# Patient Record
Sex: MALE | Race: BLACK OR AFRICAN AMERICAN | NOT HISPANIC OR LATINO | Employment: FULL TIME | URBAN - METROPOLITAN AREA
[De-identification: names, ages, dates, MRNs, and addresses within clinical notes are randomized per-mention and may not be internally consistent; named-entity substitution may affect disease eponyms.]

---

## 2017-01-25 ENCOUNTER — OFFICE VISIT (OUTPATIENT)
Dept: INTERNAL MEDICINE | Facility: CLINIC | Age: 42
End: 2017-01-25
Payer: COMMERCIAL

## 2017-01-25 VITALS
OXYGEN SATURATION: 98 % | HEART RATE: 81 BPM | TEMPERATURE: 97 F | DIASTOLIC BLOOD PRESSURE: 82 MMHG | HEIGHT: 71 IN | BODY MASS INDEX: 34.38 KG/M2 | SYSTOLIC BLOOD PRESSURE: 146 MMHG | WEIGHT: 245.56 LBS

## 2017-01-25 DIAGNOSIS — R53.83 FATIGUE, UNSPECIFIED TYPE: ICD-10-CM

## 2017-01-25 DIAGNOSIS — G44.209 TENSION HEADACHE: ICD-10-CM

## 2017-01-25 DIAGNOSIS — M62.838 MUSCLE SPASM: Primary | ICD-10-CM

## 2017-01-25 DIAGNOSIS — H57.89 RED EYE: ICD-10-CM

## 2017-01-25 LAB
FLUAV AG SPEC QL IA: NEGATIVE
FLUBV AG SPEC QL IA: NEGATIVE
SPECIMEN SOURCE: NORMAL

## 2017-01-25 PROCEDURE — 96372 THER/PROPH/DIAG INJ SC/IM: CPT | Mod: S$GLB,,, | Performed by: FAMILY MEDICINE

## 2017-01-25 PROCEDURE — 99999 PR PBB SHADOW E&M-EST. PATIENT-LVL IV: CPT | Mod: PBBFAC,,, | Performed by: PHYSICIAN ASSISTANT

## 2017-01-25 PROCEDURE — 3077F SYST BP >= 140 MM HG: CPT | Mod: S$GLB,,, | Performed by: PHYSICIAN ASSISTANT

## 2017-01-25 PROCEDURE — 3079F DIAST BP 80-89 MM HG: CPT | Mod: S$GLB,,, | Performed by: PHYSICIAN ASSISTANT

## 2017-01-25 PROCEDURE — 99213 OFFICE O/P EST LOW 20 MIN: CPT | Mod: S$GLB,,, | Performed by: PHYSICIAN ASSISTANT

## 2017-01-25 PROCEDURE — 1159F MED LIST DOCD IN RCRD: CPT | Mod: S$GLB,,, | Performed by: PHYSICIAN ASSISTANT

## 2017-01-25 PROCEDURE — 87400 INFLUENZA A/B EACH AG IA: CPT | Mod: 59,PO

## 2017-01-25 RX ORDER — METHOCARBAMOL 750 MG/1
TABLET, FILM COATED ORAL
Qty: 32 TABLET | Refills: 0 | Status: SHIPPED | OUTPATIENT
Start: 2017-01-25 | End: 2017-09-20

## 2017-01-25 RX ORDER — NAPROXEN 500 MG/1
500 TABLET ORAL 2 TIMES DAILY PRN
Qty: 20 TABLET | Refills: 0 | Status: SHIPPED | OUTPATIENT
Start: 2017-01-25 | End: 2017-02-01

## 2017-01-25 RX ORDER — KETOROLAC TROMETHAMINE 30 MG/ML
60 INJECTION, SOLUTION INTRAMUSCULAR; INTRAVENOUS
Status: COMPLETED | OUTPATIENT
Start: 2017-01-25 | End: 2017-01-25

## 2017-01-25 RX ADMIN — KETOROLAC TROMETHAMINE 60 MG: 30 INJECTION, SOLUTION INTRAMUSCULAR; INTRAVENOUS at 01:01

## 2017-01-25 NOTE — PATIENT INSTRUCTIONS
Back Spasm (No Trauma)    Spasm of the back muscles can occur after a sudden forceful twisting or bending force (such as in a car accident), after a simple awkward movement, or after lifting something heavy with poor body positioning. In any case, muscle spasm adds to the pain. Sleeping in an awkward position or on a poor quality mattress can also cause this. Some people respond to emotional stress by tensing the muscles of their back.  Pain that continues may need further evaluation or other types of treatment such as physical therapy.  You don't always need X-rays for the initial evaluation of back pain, unless you had a physical injury such as from a car accident or fall. If your pain continues and doesn't respond to medical treatment, X-rays and other tests may then be done.   Home care  · As soon as possible, start sitting or walking again to avoid problems from prolonged bed rest (muscle weakness, worsening back stiffness and pain, blood clots in the legs).  · When in bed, try to find a position of comfort. A firm mattress is best. Try lying flat on your back with pillows under your knees. You can also try lying on your side with your knees bent up toward your chest and a pillow between your knees.  · Avoid prolonged sitting, long car rides, or travel. This puts more stress on the lower back than standing or walking.   · During the first 24 to 72 hours after an injury or flare-up, apply an ice pack to the painful area for 20 minutes, then remove it for 20 minutes. Do this over a period of 60 to 90 minutes or several times a day. This will reduce swelling and pain. Always wrap ice packs in a thin towel.  · You can start with ice, then switch to heat. Heat (hot shower, hot bath, or heating pad) reduces pain, and works well for muscle spasms. Apply heat to the painful area for 20 minutes, then remove it for 20 minutes. Do this over a period of 60 to 90 minutes or several times a day. Do not sleep on a heating  pad as it can burn or damage skin.  · Alternate ice and heat therapies.  · Be aware of safe lifting methods and do not lift anything over 15 pounds until all the pain is gone.  Gentle stretching will help your back heal faster. Do this simple routine 2 to 3 times a day until your back is feeling better.  · Lie on your back with your knees bent and both feet on the ground  · Slowly raise your left knee to your chest as you flatten your lower back against the floor. Hold for 20 to 30 seconds.  · Relax and repeat the exercise with your right knee.  · Do 2 to 3 of these exercises for each leg.  · Repeat, hugging both knees to your chest at the same time.  · Do not bounce, but use a gentle pull.  Medicines  Talk to your doctor before using medicine, especially if you have other medical problems or are taking other medicines.  You may use acetaminophen or ibuprofen to control pain, unless your healthcare provider prescribed another pain medicine. If you have a chronic condition such as diabetes, liver or kidney disease, stomach ulcer, or gastrointestinal bleeding, or are taking blood thinners, talk with your healthcare provider before taking any medicines.  Be careful if you are given prescription pain medicine, narcotics, or medicine for muscle spasm. They can cause drowsiness, affect your coordination, reflexes, or judgment. Do not drive or operate heavy machinery when taking these medicines. Take pain medicine only as prescribed by your healthcare provider.  Follow-up care  Follow up with your doctor, or as advised. Physical therapy or further tests may be needed.  If X-rays were taken, they may be reviewed by a radiologist. You will be notified of any new findings that may affect your care.  Call 911  Seek emergency medical care if any of these occur:  · Trouble breathing  · Confusion  · Drowsiness or trouble awakening  · Fainting or loss of consciousness  · Rapid or very slow heart rate  · Loss of bowel or bladder  control  When to seek medical advice  Call your healthcare provider right away if any of these occur:  · Pain becomes worse or spreads to your legs  · Weakness or numbness in one or both legs  · Numbness in the groin or genital area  · Unexplained fever over 100.4ºF (38.0ºC)  · Burning or pain when passing urine  © 5391-2840 [employer]. [address] [place] [address]. All rights reserved. This information is not intended as a substitute for professional medical care. Always follow your healthcare professional's instructions.        Muscle Spasm  A muscle spasm is a sudden tightening of the muscle you cant control. This may be caused by strain, overworking the muscle, or injury. It can also be caused by dehydration, electrolyte imbalance, diabetes, alcohol use, and certain medicines. If it goes on long enough the muscle spasm causes pain. Common areas for muscle spasm are the legs, neck, and back.  Home care  · Heat, massage, and stretching will help relax muscle spasm.  · When the spasm is in your arm or leg, stretch the muscle passively. To do this, have someone bend or straighten the joint above or below the muscle until you feel the stretch on the sore muscle. You can stretch the muscle actively by moving the affected body part. This will stretch the muscle that is in spasm. For example, if the spasm is in your calf, bend the ankle so your toes point upward toward your knee. This will stretch your calf muscle.  · You may use over-the-counter pain medicine to control pain, unless another medicine was prescribed. If you have chronic liver or kidney disease or ever had a stomach ulcer or GI bleeding, talk with your healthcare provider before using these medicines.  Follow-up care  Follow up with your healthcare provider, or as advised.    When to seek medical advice  Call your healthcare provider right away if any of the following occur:  · Fingers or toes become swollen, cold, blue, numb,  or tingly  · You develop weakness in the affected arm or leg  · Pain increases and is not controlled by the above measures  © 0105-6827 [employer]. [address] [place] [address], [place] [address]. All rights reserved. This information is not intended as a substitute for professional medical care. Always follow your healthcare professional's instructions.        Neck Spasm    A spasm of the neck muscles can happen after a sudden awkward neck movement. Sleeping with your neck in a crooked position can also cause spasm. Some people respond to emotional stress by tensing the muscles of their neck, shoulders, and upper back. If neck spasm lasts long enough, it can cause headache.  The treatment described below will usually help the pain to go away in 5 to 7 days. Pain that continues may need further evaluation or other types of treatment such as physical therapy.  Home care  · Rest and relax the muscles. Use a comfortable pillow that supports the head and keeps the spine in a neutral position. The position of the head should not be tilted forward or backward. A rolled up towel may help for a custom fit.  · Some people find relief with heat. Heat can be applied with either a warm shower or bath or a moist towel heated in the microwave and massage. Others prefer cold packs. You can make an ice pack by filling a plastic bag that seals at the top with ice cubes or crushed ice and then wrapping it with a thin towel. Try both and use the method that feels best for 15 to 20 minutes, several times a day.  · Whether using ice or heat, be careful that you do not injure your skin. Never put ice directly on the skin. Always wrap the ice in a towel or other type of cloth. This is very important, especially in people with poor skin sensations.  · Try to reduce your stress level. Emotional stress can lead to neck muscle tension and get in the way of or delay the healing process.  · You may use over-the-counter pain medicine to  control pain, unless another medicine was prescribed.If you have chronic liver or kidney disease or ever had a stomach ulcer or GI bleeding, talk with your healthcare provider before using these medicines.  Follow-up care  Follow up with your healthcare provider if your symptoms do not show signs of improvement after one week. Physical therapy or further tests may be needed.  If X-rays, CT scans, or MRI scans were taken, you will be told of any new findings that may affect your care.  Call 911  Call 911 if you have:  · Sudden weakness or numbness in one or both arms  · Neck swelling, difficulty or painful swallowing  · Difficulty breathing  · Chest pain  When to seek medical advice  Call your healthcare provider right away if any of these occur:  · Pain becomes worse or spreads into one or both arms  · Increasing headache with nausea or vomiting  · Fever of 100.4°F (38°C) or above lasting for 24 to 48 hours  © 1968-9340 [employer]. [address] [place] [address]. All rights reserved. This information is not intended as a substitute for professional medical care. Always follow your healthcare professional's instructions.        Understanding Noninfectious Red Eye: Treating Inflammation  Red eyes are sometimes caused by viral or bacterial infections. But inflammation in one or both eyes often happens because of allergies or environmental irritants. Here's a closer look at these two common causes of eye inflammation, and how to treat them.     Allergies  Do your eyes swell and itch when you pet a cat? Do they get red, watery, and itchy every spring or summer? If so, you may have an allergy to animals. Or you may have an  allergy to a fine powder (pollen) made by certain plants. Along with dust and mold, animals and pollen are the most common causes of allergies. Often both eyes will get inflamed when you are around whatever causes your allergy.  Treating the symptoms:  · The only cure for an  allergy is to avoid the substance (allergen) that causes it.  · Eye drops and cold compresses can help reduce swelling. They can ease redness and itching.  · Symptoms often get better if you use allergy eye drops. Or if you limit your contact with the allergen.   · If your allergy is severe, your healthcare provider may prescribe oral medicine. This may include antihistamines or steroids.  · Your provider may refer you to a specialist.  Environmental irritants  Your eyes can also be irritated by things such as:  · Air pollution  · Smoke  · Fumes  · Some eye drops  · Contact lenses, especially extended-wear lenses  These environmental irritants can make your eyes blood vessels swell. Your eyelids may get red and swollen. Your eyes may water. But they dont often itch as much as they do with allergies. Depending on the cause, one or both eyes may be inflamed.  Treating the symptoms:  · Stay away from the irritant.  · Try artificial tears to help flush out your eye. They lubricate your eyes surface.  · Ask your provider about medicated anti-inflammatory or anti-allergy eye drops. These can reduce swelling and ease redness.  © 0022-9902 [employer]. [address] [place] [address]. All rights reserved. This information is not intended as a substitute for professional medical care. Always follow your healthcare professional's instructions.        Understanding Red Eye: Causes  Do your eyes sometimes get red and irritated? This could be a sign of irritation or infection. The inside of your eyelid and the white of your eye are covered with a membrane called the conjunctiva. When your eye is irritated or infected, the blood vessels in this membrane swell. This condition is called conjunctivitis. It is also known as red eye or pink eye.    Irritation  Allergies and environmental irritants are common causes of inflamed eyes. Other causes can include:  · Injuries  · Objects in the eye  · Some eye  drops  · Makeup  · Contact lenses  · Diseases inside the eye  Infection  Viruses and bacteria can cause eye infections. An infection may begin in your eye. It can also start somewhere else in your body, such as your nose or throat. Sexually transmitted diseases can also cause eye infections.  © 4609-7602 [employer]. [address] [place] [address]. All rights reserved. This information is not intended as a substitute for professional medical care. Always follow your healthcare professional's instructions.        Treating Dry Eyes    Artificial tears are the most common treatment for dry eyes. If they dont relieve your symptoms, your eye doctor may put in plugs. Or you may have surgery to stop the draining and increase the tear film.  Artificial tears  Artificial tears, or lubricating eye drops, replace your natural lubricating tears. You can buy most lubricating eye drops without a prescription. And you can use them as often as needed. Lubricating eye drops are not the same as eye drops used to relieve redness or itching. Check with your eye doctor or pharmacist to be sure you buy the right drops.  Some lubricating eye drops have chemicals called preservatives. This makes them last longer. Your eyes may be sensitive to these drops. Or you may need to use them often. If so, you may want to buy lubricating eye drops made without preservatives. Your eye doctor may also suggest using a lubricating eye ointment at night.  Medicine  Your doctor may prescribe medicine such as cyclosporine to treat your eye condition. It can help increase your eyes' ability to make tears.  Plugs    Closing the puncta with plugs can help keep the tear film on your eye. The plug acts like a stopper in a sink. It allows only a small amount of tears to drain out of your eye. Your eye doctor may first try short-term (temporary) plugs that dissolve in a few days. If these help, he or she may then put in long-term plugs.  Your eyes will be numbed with drops when the plugs are inserted. You shouldnt feel any pain. And you shouldnt feel the plugs once theyre in.   Surgery  If artificial tears or plugs dont relieve your dry eyes, surgery may be an option. Your eye doctor may do minor outpatient surgery to narrow or block the openings to the drainage canals. If your dry eyes are caused by eyelid problems, your eye doctor may recommend other kinds of surgery.  © 0862-2420 [employer]. [address] [place] [address]. All rights reserved. This information is not intended as a substitute for professional medical care. Always follow your healthcare professional's instructions.        What Are Dry Eyes?  Do your eyes ever sting, burn, or feel scratchy? To be comfortable, your eyes need to be bathed, or lubricated, with tears. Normally, there is always a film of tears on the surface of your eyes. But if your eyes dont produce enough tears, the surface gets irritated. This is known as dry eyes.    Not enough lubricating tears  When you cry, or get something in your eye, or have an infection, your eyes make reflex tears. Each time you blink, another kind of tears, called lubricating tears, spread over the surface of your eyes. These tears keep the eyes moist and comfortable. You arent aware of these tears because they stay on the surface of your eyes.   Without lubricating tears, your eyes get dry. Then they burn or sting and feel scratchy. They may also water. But this doesnt relieve the dryness. That's because the eyes water with reflex tears, not lubricating tears.  What causes dry eyes?  · Aging  · Heaters and air conditioners  · Wind, smoke, or dry weather  · Allergies such as hay fever  · Medicines  · Eyelid problems, injuries to the eye, or diseases like rheumatoid arthritis  How lubricating tears flow  Lubricating tears flow from glands in your upper eyelid over the surface of your eye. From your eye, the  "tears drain into canals that lead to your nose.  © 7387-6486 [employer]. [address] [place] [address], [place] [address]. All rights reserved. This information is not intended as a substitute for professional medical care. Always follow your healthcare professional's instructions.        Self-Care for Headaches  Most headaches aren't serious and can be relieved with self-care. But some headaches may be a sign of another health problem like eye trouble or high blood pressure. To find the best treatment, learn what kind of headaches you get. For tension headaches, self-care will usually help. To treat migraines, ask your healthcare provider for advice. It is also possible to get both tension and migraine headaches. Self-care involves relieving the pain and avoiding headache triggers if you can.    Ways to reduce pain and tension  Try these steps:  · Apply a cold compress or ice pack to the pain site.  · Drink fluids. If nausea makes it hard to drink, try sucking on ice.  · Rest. Protect yourself from bright light and loud noises.  · Calm your emotions by imagining a peaceful scene.  · Massage tight neck, shoulder, and head muscles.  · To relax muscles, soak in a hot bath or use a hot shower.  Use medicines  Aspirin or aspirin substitutes, such as ibuprofen and acetaminophen, can relieve headache. Remember: Never give aspirin to anyone 18 years old or younger because of the risk of developing Reye syndrome. Use pain medicines only when necessary.  Track your headaches  Keeping a headache diary can help you and your healthcare provider identify what's causing your headaches:  · Note when each headache happens.  · Identify your activities and the foods you've eaten 6 to 8 hours before the headache began.  · Look for any trends or "triggers."  Signs of tension headache  Any of the following can be signs:  · Dull pain or feeling of pressure in a tight band around your head  · Pain in your neck or " "shoulders  · Headache without a definite beginning or end  · Headache after an activity such as driving or working on a computer  Signs of migraine  Any of the following can be signs:  · Throbbing pain on one or both sides of your head  · Nausea or vomiting  · Extreme sensitivity to light, sound, and smells  · Bright spots, flashes, or other visual changes  · Pain or nausea so severe that you can't continue your daily activities  Call your healthcare provider   If you have any of the following symptoms, contact your healthcare provider:  · A headache that lingers after a recent injury or bump to the head.  · A fever with a stiff neck or pain when you bend your head toward your chest.  · A headache along with slurred speech, changes in your vision, or numbness or weakness in your arms or legs.  · A headache for longer than 3 days.  · Frequent headaches, especially in the morning.  · Headaches with seizures   · Seek immediate medical attention if you have a headache that you would call "the worst headache you have ever had."   © 7290-0121 [employer]. [address] [place], [place] [address]. All rights reserved. This information is not intended as a substitute for professional medical care. Always follow your healthcare professional's instructions.        Preventing Tension Headaches  Lifestyle changes are the key to preventing tension-type headaches. Learn what changes in your environment and daily activities can prevent the strain and tension that lead to headaches. If emotional stress is a factor, stress reduction may bring relief. Other lifestyle changes can help keep you healthier and better able to cope with pain.  What may cause your headaches  Causes of tension-type headaches include:  · Posture and movement. Your posture while you sit, work, drive, and even sleep can put stress on your shoulders and neck. This can tighten muscles in the back of your head, causing headaches.  · Lifting and " carrying. These can cause strain on your back and neck, especially if you carry too much weight, carry weight unevenly, or use poor lifting technique.  · Certain sports. Activities that involve jumping, running, and sudden starts, stops, or changes of direction can jar your neck and head. This may lead to tight muscles and pain. Weightlifting and other activities that require upper body strength can lead to tight neck and shoulder muscles.  · Jaw tension. Clenching your jaw or grinding your teeth can result in tension and pain. This may happen while you sleep without your knowing it.  · Eye problems. Eyestrain can cause tension in the muscles around the eyes. Or a problem with your eyeglass prescription can make you hold your head at an awkward angle. This can cause neck strain and headaches.  · Emotional stress. Many factors lead to emotional stress: overwork, family problems, financial difficulties, or sudden life changes. This may cause muscle tension.  What you can do  Once you know whats causing your headaches, you can work to prevent them. You may need to seek professional help to make some of the needed changes.  · Posture and movement changes. Change the setup of your workspace and car. Learn and maintain good posture. Avoid positions that strain your neck or shoulders. Make sure your bedding and pillows provide good support. Avoid sleeping on couches, chairs, or floors when a bed is available.   · Lifting and carrying. Learn good lifting technique. Make sure to use the proper tools and equipment for lifting.  · Change your sport. Switch to a low-impact sport. To help relieve stress on your neck and head, cut back on activities that depend on upper body strength or that put a lot of twisting motion on the back, such as golf.   · Dental work. See your dentist if you think your headaches are caused by jaw tension or teeth grinding.  · New eyewear. Buy an extra pair of glasses adjusted for reading or working  at a computer. This helps to reduce eyestrain and keep your neck at a comfortable angle.  · Stress management. Learn techniques for relaxing and reducing emotional stress, like deep breathing, visualization, progressive relaxation, and biofeedback. Regular sleep habits can also help to control stress.  · Regular sleep and meals. It is important to have a regular sleep cycle and to avoid skipping meals.  Exercise can help  Exercise can improve overall health and help you to relax.  · Neck exercises help keep your neck muscles relaxed during the day. Try the neck exercises shown on this sheet. Start in a neutral (relaxed, centered) position. Do 3 repetitions every 2 to 4 hours throughout the day.  · Low-impact aerobic exercise helps keep your muscles strong and flexible. This helps prevent tension and the pain it causes.  · Stretching, nathaly chi, and yoga help keep your muscles flexible. They may also help relieve emotional stress.    Lower your left ear toward your left shoulder. Return to neutral. Repeat on the right side.   Lower your chin to your chest and slowly return to neutral.     Look to the left. Return to neutral. Then look to the right.     Move your head forward and back while keeping the top of your head level.   © 4334-9476 [employer]. [address] [place] [address], [place] [address]. All rights reserved. This information is not intended as a substitute for professional medical care. Always follow your healthcare professional's instructions.        Tension Headaches  Tension headaches cause a dull, steady pain on both sides of the head and in the neck and the back of the head. The eyes may also feel tired. Tension headaches can be triggered by muscle tension and clenching, lack of sleep, poor posture, eyestrain, stress,depression, anxiety, arthritis of the neck, and other factors.    To help prevent tension headaches  Take the following steps:  · Make sure your work area is properly set up to  help you avoid neck strain and eyestrain.  · Make sure that your eyeglass prescription is current and is appropriate for the work you do.  · Learn techniques for relaxing and reducing emotional stress. These include deep breathing, progressive relaxation, yoga, meditation, and biofeedback.  · Maintain a regular exercise regimen under the guidance of a doctor. This can help keep your neck and back flexible, strong, and relaxed.  To relieve the pain  Take the following steps:  · Use moist heat to relax the muscles. Soak in a hot bath or wrap a warm, moist towel around your neck.  · Brush your scalp lightly with a soft hairbrush.  · Give yourself a massage. Knead the muscles running from your shoulders up the back of your skull.  · Use an ice pack. Apply this directly to the place where you feel pain.  · Rest. Sleeping often helps relieve headache pain.  · Drink plenty of fluids. Dehydration is another trigger for headaches.  · Use pain medicine sparingly for moderate to severe pain.   © 4096-5881 [employer]. [address] [place] [address], [place] [address]. All rights reserved. This information is not intended as a substitute for professional medical care. Always follow your healthcare professional's instructions.
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
## 2017-01-25 NOTE — MR AVS SNAPSHOT
[facility]  [address] [facility] [place] [address]  Phone: [phone]  Fax: [phone]                  Yoandy Forde   2017 1:20 PM   Office Visit    Description:  Male : 1975   Provider:  Danielle Charles PA-C   Department:  [facility] - Internal Medicine           Reason for Visit     Hypertension           Diagnoses this Visit        Comments    Muscle spasm    -  Primary     Fatigue, unspecified type         Red eye                To Do List           Future Appointments        Provider Department Dept Phone    2017 9:00 AM LABORATORY, [facility] [phone]    2017 9:30 AM Nerissa Dunn MD [facility] [phone]    2017 8:20 AM Mary Feliciano MD [facility] [phone]      Goals (5 Years of Data)     None      Follow-Up and Disposition     Return if symptoms worsen or fail to improve.       These Medications        Disp Refills Start End    naproxen (NAPROSYN) 500 MG tablet 20 tablet 0 2017    Take 1 tablet (500 mg total) by mouth 2 (two) times daily as needed (Do not take more than 2 pills in 24 hours). - Oral    Pharmacy: [id number] - [address] Ph #: [phone]       methocarbamol (ROBAXIN) 750 MG Tab 32 tablet 0 2017     Take 1,500 mg (two tablets) four times daily for first 2 days followed by 750mg (one tablet) four times/day.    Pharmacy: [id number] Ph #: [phone]         [facility]     [facility] -  Assistance  Registered nurses in the [facility] provide clinical advisement, health education, appointment booking, and other advisory services.  Call for this free service at [phone].             Medications           Message regarding Medications     Verify the changes and/or additions to your medication regime listed below  are the same as discussed with your clinician today.  If any of these changes or additions are incorrect, please notify your healthcare provider.        START taking these NEW medications        Refills    naproxen (NAPROSYN) 500 MG tablet 0    Sig: Take 1 tablet (500 mg total) by mouth 2 (two) times daily as needed (Do not take more than 2 pills in 24 hours).    Class: Normal    Route: Oral    methocarbamol (ROBAXIN) 750 MG Tab 0    Sig: Take 1,500 mg (two tablets) four times daily for first 2 days followed by 750mg (one tablet) four times/day.    Class: Normal      These medications were administered today        Dose Freq    ketorolac injection 60 mg 60 mg Clinic/[facility] 1 time    Sig: Inject 2 mLs (60 mg total) into the muscle one time.    Class: Normal    Route: Intramuscular      STOP taking these medications     amoxicillin-clavulanate 875-125mg (AUGMENTIN) 875-125 mg per tablet Take 1 tablet by mouth 2 (two) times daily.    CHLO TUSS 1-30-12.5 mg/5 mL Liqd take 2 teaspoonful by mouth every 6 to 8 hours if needed           Verify that the below list of medications is an accurate representation of the medications you are currently taking.  If none reported, the list may be blank. If incorrect, please contact your healthcare provider. Carry this list with you in case of emergency.           Current Medications     fenofibrate (TRICOR) 145 MG tablet take 1 tablet by mouth once daily    fluticasone (FLONASE) 50 mcg/actuation nasal spray 2 sprays by Each Nare route once daily.    hydroxychloroquine (PLAQUENIL) 200 mg tablet Take 1 tablet (200 mg total) by mouth once daily.    lisinopril (PRINIVIL,ZESTRIL) 20 MG tablet take 1 tablet by mouth once daily    nabumetone (RELAFEN) 750 MG tablet Take 1 tablet (750 mg total) by mouth 2 (two) times daily as needed for Pain.    methocarbamol (ROBAXIN) 750 MG Tab Take 1,500 mg (two tablets) four times daily for first 2 days followed by 750mg (one tablet) four times/day.     "naproxen (NAPROSYN) 500 MG tablet Take 1 tablet (500 mg total) by mouth 2 (two) times daily as needed (Do not take more than 2 pills in 24 hours).           Clinical Reference Information           Vital Signs - Last Recorded  Most recent update: 1/25/2017  1:07 PM by Lauren Burgess LPN    BP Pulse Temp Ht Wt SpO2    (!) 146/82 (BP Location: Right arm, Patient Position: Sitting) 81 97.2 °F (36.2 °C) (Tympanic) 5' 11" (1.803 m) 111.4 kg (245 lb 9.5 oz) 98%    BMI                34.25 kg/m2          Blood Pressure          Most Recent Value    BP  (!)  146/82      Allergies as of 1/25/2017     Sulfa (Sulfonamide Antibiotics)    Sulfamethoxazole-trimethoprim      Immunizations Administered on Date of Encounter - 1/25/2017     None      Orders Placed During Today's Visit      Normal Orders This Visit    Influenza antigen Nasopharyngeal Swab       [facility] Sign-Up     Activating your [facility] account is as easy as 1-2-3!     1) Visit [email or web address], select Sign Up Now, enter this activation code and your date of birth, then select Next.  [email or web address]  Expires: 2/2/2017 10:04 AM      2) Create a username and password to use when you visit [facility] in the future and select a security question in case you lose your password and select Next.    3) Enter your e-mail address and click Sign Up!    Additional Information  If you have questions, please e-mail [email or web address] or call [phone] to talk to our [facility] staff. Remember, [facility] is NOT to be used for urgent needs. For medical emergencies, dial 911.         Instructions      Back Spasm (No Trauma)    Spasm of the back muscles can occur after a sudden forceful twisting or bending force (such as in a car accident), after a simple awkward movement, or after lifting something heavy with poor body positioning. In any case, muscle spasm adds to the pain. Sleeping in an awkward position or on a poor quality mattress can also cause this. Some people " respond to emotional stress by tensing the muscles of their back.  Pain that continues may need further evaluation or other types of treatment such as physical therapy.  You don't always need X-rays for the initial evaluation of back pain, unless you had a physical injury such as from a car accident or fall. If your pain continues and doesn't respond to medical treatment, X-rays and other tests may then be done.   Home care  · As soon as possible, start sitting or walking again to avoid problems from prolonged bed rest (muscle weakness, worsening back stiffness and pain, blood clots in the legs).  · When in bed, try to find a position of comfort. A firm mattress is best. Try lying flat on your back with pillows under your knees. You can also try lying on your side with your knees bent up toward your chest and a pillow between your knees.  · Avoid prolonged sitting, long car rides, or travel. This puts more stress on the lower back than standing or walking.   · During the first 24 to 72 hours after an injury or flare-up, apply an ice pack to the painful area for 20 minutes, then remove it for 20 minutes. Do this over a period of 60 to 90 minutes or several times a day. This will reduce swelling and pain. Always wrap ice packs in a thin towel.  · You can start with ice, then switch to heat. Heat (hot shower, hot bath, or heating pad) reduces pain, and works well for muscle spasms. Apply heat to the painful area for 20 minutes, then remove it for 20 minutes. Do this over a period of 60 to 90 minutes or several times a day. Do not sleep on a heating pad as it can burn or damage skin.  · Alternate ice and heat therapies.  · Be aware of safe lifting methods and do not lift anything over 15 pounds until all the pain is gone.  Gentle stretching will help your back heal faster. Do this simple routine 2 to 3 times a day until your back is feeling better.  · Lie on your back with your knees bent and both feet on the  ground  · Slowly raise your left knee to your chest as you flatten your lower back against the floor. Hold for 20 to 30 seconds.  · Relax and repeat the exercise with your right knee.  · Do 2 to 3 of these exercises for each leg.  · Repeat, hugging both knees to your chest at the same time.  · Do not bounce, but use a gentle pull.  Medicines  Talk to your doctor before using medicine, especially if you have other medical problems or are taking other medicines.  You may use acetaminophen or ibuprofen to control pain, unless your healthcare provider prescribed another pain medicine. If you have a chronic condition such as diabetes, liver or kidney disease, stomach ulcer, or gastrointestinal bleeding, or are taking blood thinners, talk with your healthcare provider before taking any medicines.  Be careful if you are given prescription pain medicine, narcotics, or medicine for muscle spasm. They can cause drowsiness, affect your coordination, reflexes, or judgment. Do not drive or operate heavy machinery when taking these medicines. Take pain medicine only as prescribed by your healthcare provider.  Follow-up care  Follow up with your doctor, or as advised. Physical therapy or further tests may be needed.  If X-rays were taken, they may be reviewed by a radiologist. You will be notified of any new findings that may affect your care.  Call 911  Seek emergency medical care if any of these occur:  · Trouble breathing  · Confusion  · Drowsiness or trouble awakening  · Fainting or loss of consciousness  · Rapid or very slow heart rate  · Loss of bowel or bladder control  When to seek medical advice  Call your healthcare provider right away if any of these occur:  · Pain becomes worse or spreads to your legs  · Weakness or numbness in one or both legs  · Numbness in the groin or genital area  · Unexplained fever over 100.4ºF (38.0ºC)  · Burning or pain when passing urine  © 0794-5954 [employer]. [address] [place] [address]  [place] [address]. All rights reserved. This information is not intended as a substitute for professional medical care. Always follow your healthcare professional's instructions.        Muscle Spasm  A muscle spasm is a sudden tightening of the muscle you cant control. This may be caused by strain, overworking the muscle, or injury. It can also be caused by dehydration, electrolyte imbalance, diabetes, alcohol use, and certain medicines. If it goes on long enough the muscle spasm causes pain. Common areas for muscle spasm are the legs, neck, and back.  Home care  · Heat, massage, and stretching will help relax muscle spasm.  · When the spasm is in your arm or leg, stretch the muscle passively. To do this, have someone bend or straighten the joint above or below the muscle until you feel the stretch on the sore muscle. You can stretch the muscle actively by moving the affected body part. This will stretch the muscle that is in spasm. For example, if the spasm is in your calf, bend the ankle so your toes point upward toward your knee. This will stretch your calf muscle.  · You may use over-the-counter pain medicine to control pain, unless another medicine was prescribed. If you have chronic liver or kidney disease or ever had a stomach ulcer or GI bleeding, talk with your healthcare provider before using these medicines.  Follow-up care  Follow up with your healthcare provider, or as advised.    When to seek medical advice  Call your healthcare provider right away if any of the following occur:  · Fingers or toes become swollen, cold, blue, numb, or tingly  · You develop weakness in the affected arm or leg  · Pain increases and is not controlled by the above measures  © 9665-0105 [employer]. [address] [place] [address]. All rights reserved. This information is not intended as a substitute for professional medical care. Always follow your healthcare professional's  instructions.        Neck Spasm    A spasm of the neck muscles can happen after a sudden awkward neck movement. Sleeping with your neck in a crooked position can also cause spasm. Some people respond to emotional stress by tensing the muscles of their neck, shoulders, and upper back. If neck spasm lasts long enough, it can cause headache.  The treatment described below will usually help the pain to go away in 5 to 7 days. Pain that continues may need further evaluation or other types of treatment such as physical therapy.  Home care  · Rest and relax the muscles. Use a comfortable pillow that supports the head and keeps the spine in a neutral position. The position of the head should not be tilted forward or backward. A rolled up towel may help for a custom fit.  · Some people find relief with heat. Heat can be applied with either a warm shower or bath or a moist towel heated in the microwave and massage. Others prefer cold packs. You can make an ice pack by filling a plastic bag that seals at the top with ice cubes or crushed ice and then wrapping it with a thin towel. Try both and use the method that feels best for 15 to 20 minutes, several times a day.  · Whether using ice or heat, be careful that you do not injure your skin. Never put ice directly on the skin. Always wrap the ice in a towel or other type of cloth. This is very important, especially in people with poor skin sensations.  · Try to reduce your stress level. Emotional stress can lead to neck muscle tension and get in the way of or delay the healing process.  · You may use over-the-counter pain medicine to control pain, unless another medicine was prescribed.If you have chronic liver or kidney disease or ever had a stomach ulcer or GI bleeding, talk with your healthcare provider before using these medicines.  Follow-up care  Follow up with your healthcare provider if your symptoms do not show signs of improvement after one week. Physical therapy or  further tests may be needed.  If X-rays, CT scans, or MRI scans were taken, you will be told of any new findings that may affect your care.  Call 911  Call 911 if you have:  · Sudden weakness or numbness in one or both arms  · Neck swelling, difficulty or painful swallowing  · Difficulty breathing  · Chest pain  When to seek medical advice  Call your healthcare provider right away if any of these occur:  · Pain becomes worse or spreads into one or both arms  · Increasing headache with nausea or vomiting  · Fever of 100.4°F (38°C) or above lasting for 24 to 48 hours  © 1374-5136 [employer]. [address] [place] [address], [place] [address]. All rights reserved. This information is not intended as a substitute for professional medical care. Always follow your healthcare professional's instructions.        Understanding Noninfectious Red Eye: Treating Inflammation  Red eyes are sometimes caused by viral or bacterial infections. But inflammation in one or both eyes often happens because of allergies or environmental irritants. Here's a closer look at these two common causes of eye inflammation, and how to treat them.     Allergies  Do your eyes swell and itch when you pet a cat? Do they get red, watery, and itchy every spring or summer? If so, you may have an allergy to animals. Or you may have an  allergy to a fine powder (pollen) made by certain plants. Along with dust and mold, animals and pollen are the most common causes of allergies. Often both eyes will get inflamed when you are around whatever causes your allergy.  Treating the symptoms:  · The only cure for an allergy is to avoid the substance (allergen) that causes it.  · Eye drops and cold compresses can help reduce swelling. They can ease redness and itching.  · Symptoms often get better if you use allergy eye drops. Or if you limit your contact with the allergen.   · If your allergy is severe, your healthcare provider may prescribe oral medicine.  This may include antihistamines or steroids.  · Your provider may refer you to a specialist.  Environmental irritants  Your eyes can also be irritated by things such as:  · Air pollution  · Smoke  · Fumes  · Some eye drops  · Contact lenses, especially extended-wear lenses  These environmental irritants can make your eyes blood vessels swell. Your eyelids may get red and swollen. Your eyes may water. But they dont often itch as much as they do with allergies. Depending on the cause, one or both eyes may be inflamed.  Treating the symptoms:  · Stay away from the irritant.  · Try artificial tears to help flush out your eye. They lubricate your eyes surface.  · Ask your provider about medicated anti-inflammatory or anti-allergy eye drops. These can reduce swelling and ease redness.  © 3961-4313 [employer]. [address] [place] [address]. All rights reserved. This information is not intended as a substitute for professional medical care. Always follow your healthcare professional's instructions.        Understanding Red Eye: Causes  Do your eyes sometimes get red and irritated? This could be a sign of irritation or infection. The inside of your eyelid and the white of your eye are covered with a membrane called the conjunctiva. When your eye is irritated or infected, the blood vessels in this membrane swell. This condition is called conjunctivitis. It is also known as red eye or pink eye.    Irritation  Allergies and environmental irritants are common causes of inflamed eyes. Other causes can include:  · Injuries  · Objects in the eye  · Some eye drops  · Makeup  · Contact lenses  · Diseases inside the eye  Infection  Viruses and bacteria can cause eye infections. An infection may begin in your eye. It can also start somewhere else in your body, such as your nose or throat. Sexually transmitted diseases can also cause eye infections.  © 6885-8446 [employer]. [address] [place] [address]  Road, [place] [address]. All rights reserved. This information is not intended as a substitute for professional medical care. Always follow your healthcare professional's instructions.             Smoking Cessation     If you would like to quit smoking:   You may be eligible for free services if you are a [place] resident and started smoking cigarettes before September 1, 1988.  Call the [employer]oking Cessation [employer] (S[employer]) toll free at (782) 158-3212 or (332) 008-7412.   Call 4-800-QUIT-NOW if you do not meet the above criteria.

## 2017-01-27 ENCOUNTER — OFFICE VISIT (OUTPATIENT)
Dept: INTERNAL MEDICINE | Facility: CLINIC | Age: 42
End: 2017-01-27
Payer: COMMERCIAL

## 2017-01-27 VITALS
WEIGHT: 243.19 LBS | TEMPERATURE: 98 F | HEART RATE: 68 BPM | DIASTOLIC BLOOD PRESSURE: 98 MMHG | HEIGHT: 71 IN | SYSTOLIC BLOOD PRESSURE: 148 MMHG | OXYGEN SATURATION: 99 % | BODY MASS INDEX: 34.05 KG/M2

## 2017-01-27 DIAGNOSIS — H57.89 RED EYE: Primary | ICD-10-CM

## 2017-01-27 DIAGNOSIS — I10 ESSENTIAL HYPERTENSION: ICD-10-CM

## 2017-01-27 PROCEDURE — 99999 PR PBB SHADOW E&M-EST. PATIENT-LVL III: CPT | Mod: PBBFAC,,, | Performed by: PHYSICIAN ASSISTANT

## 2017-01-27 PROCEDURE — 99213 OFFICE O/P EST LOW 20 MIN: CPT | Mod: S$GLB,,, | Performed by: PHYSICIAN ASSISTANT

## 2017-01-27 PROCEDURE — 3077F SYST BP >= 140 MM HG: CPT | Mod: S$GLB,,, | Performed by: PHYSICIAN ASSISTANT

## 2017-01-27 PROCEDURE — 1159F MED LIST DOCD IN RCRD: CPT | Mod: S$GLB,,, | Performed by: PHYSICIAN ASSISTANT

## 2017-01-27 PROCEDURE — 3080F DIAST BP >= 90 MM HG: CPT | Mod: S$GLB,,, | Performed by: PHYSICIAN ASSISTANT

## 2017-01-27 RX ORDER — LISINOPRIL AND HYDROCHLOROTHIAZIDE 12.5; 2 MG/1; MG/1
1 TABLET ORAL DAILY
Qty: 30 TABLET | Refills: 0 | Status: SHIPPED | OUTPATIENT
Start: 2017-01-27 | End: 2017-02-26

## 2017-01-27 RX ORDER — LISINOPRIL AND HYDROCHLOROTHIAZIDE 12.5; 2 MG/1; MG/1
1 TABLET ORAL DAILY
Qty: 30 TABLET | Refills: 0 | Status: SHIPPED | OUTPATIENT
Start: 2017-01-27 | End: 2017-01-27 | Stop reason: SDUPTHER

## 2017-01-27 RX ORDER — OLOPATADINE HYDROCHLORIDE 1 MG/ML
1 SOLUTION/ DROPS OPHTHALMIC 2 TIMES DAILY
Qty: 5 ML | Refills: 3 | Status: SHIPPED | OUTPATIENT
Start: 2017-01-27 | End: 2017-01-27 | Stop reason: SDUPTHER

## 2017-01-27 RX ORDER — OLOPATADINE HYDROCHLORIDE 1 MG/ML
1 SOLUTION/ DROPS OPHTHALMIC 2 TIMES DAILY
Qty: 5 ML | Refills: 3 | Status: SHIPPED | OUTPATIENT
Start: 2017-01-27 | End: 2017-02-26

## 2017-01-27 NOTE — PROGRESS NOTES
Subjective:      Patient ID: Yoandy Forde is a 41 y.o. male.    Chief Complaint: Hypertension    Hypertension   This is a chronic problem. The current episode started in the past 7 days. The problem has been gradually worsening since onset. Pertinent negatives include no anxiety, blurred vision, chest pain, headaches, malaise/fatigue, neck pain, orthopnea, palpitations, peripheral edema, PND, shortness of breath or sweats. There are no associated agents to hypertension. There are no known risk factors for coronary artery disease. Past treatments include ACE inhibitors. The current treatment provides mild improvement. There are no compliance problems.    Conjunctivitis   This is a recurrent problem. The current episode started 1 to 4 weeks ago. The problem occurs constantly. Pertinent negatives include no abdominal pain, anorexia, arthralgias, change in bowel habit, chest pain, chills, congestion, coughing, diaphoresis, fatigue, fever, headaches, joint swelling, myalgias, nausea, neck pain, numbness, rash, sore throat, swollen glands, urinary symptoms, vertigo, visual change, vomiting or weakness. Nothing aggravates the symptoms. Treatments tried: refresh tears (lubricating eye drops) The treatment provided no relief.       Review of Systems   Constitutional: Negative for activity change, appetite change, chills, diaphoresis, fatigue, fever, malaise/fatigue and unexpected weight change.   HENT: Negative for congestion, postnasal drip, rhinorrhea, sneezing and sore throat.    Eyes: Positive for discharge and redness. Negative for blurred vision, photophobia, pain, itching and visual disturbance.   Respiratory: Negative for cough, chest tightness, shortness of breath and wheezing.    Cardiovascular: Negative for chest pain, palpitations, orthopnea, leg swelling and PND.   Gastrointestinal: Negative for abdominal distention, abdominal pain, anorexia, change in bowel habit, nausea and vomiting.   Musculoskeletal:  "Negative for arthralgias, joint swelling, myalgias and neck pain.   Skin: Negative for rash.   Neurological: Negative for vertigo, weakness, numbness and headaches.     Objective:     Visit Vitals    BP (!) 148/98 (BP Location: Right arm, Patient Position: Sitting, BP Method: Manual)    Pulse 68    Temp 97.6 °F (36.4 °C) (Tympanic)    Ht 5' 11" (1.803 m)    Wt 110.3 kg (243 lb 2.7 oz)    SpO2 99%    BMI 33.91 kg/m2       Physical Exam   Constitutional: He appears well-developed and well-nourished. No distress.   Eyes: EOM and lids are normal. Pupils are equal, round, and reactive to light. Right conjunctiva is injected. Left conjunctiva is injected. Right pupil is round and reactive. Left pupil is round and reactive.   Skin: He is not diaphoretic.   Nursing note and vitals reviewed.      Assessment:     1. Red eye    2. Essential hypertension      Plan:   Red eye  -     olopatadine (PATANOL) 0.1 % ophthalmic solution; Place 1 drop into both eyes 2 (two) times daily.  Dispense: 5 mL; Refill: 3  -warm compresses to eyes in the morning.   -Educational handout on over-the-counter medications and at-home conservative care, pertinent to the patients diagnosis today, was handed to the patient and discussed in detail.  -if no improvement, follow up with optometrist    Essential hypertension  -   DISCONTINUE LISINOPRIL     -START lisinopril-hydrochlorothiazide (PRINZIDE,ZESTORETIC) 20-12.5 mg per tablet; Take 1 tablet by mouth once daily.  Dispense: 30 tablet; Refill: 0  -monitor at home, return to clinic sooner if necessary    Return in about 7 days (around 2/3/2017), or if symptoms worsen or fail to improve.    "

## 2017-01-27 NOTE — MR AVS SNAPSHOT
[facility]  [address] [place] [address]  Phone: [phone]  Fax: [phone]                  Yoandy Forde   2017 9:40 AM   Office Visit    Description:  Male : 1975   Provider:  Danielle Charles PA-C   Department:  [facility]           Reason for Visit     Hypertension           Diagnoses this Visit        Comments    Red eye    -  Primary     Essential hypertension                To Do List           Future Appointments        Provider Department Dept Phone    2/3/2017 9:40 AM INTERNAL MEDICINE NURSE, [facility] [phone]    2017 9:00 AM LABORATORY, [facility] [phone]    2017 9:30 AM Nerissa Dunn MD [facility] Rheumatology [phone]    2017 8:20 AM Mary Feliciano MD [facility] [phone]      Goals (5 Years of Data)     None      Follow-Up and Disposition     Return in about 7 days (around 2/3/2017), or if symptoms worsen or fail to improve.    Follow-up and Disposition History       These Medications        Disp Refills Start End    lisinopril-hydrochlorothiazide (PRINZIDE,ZESTORETIC) 20-12.5 mg per tablet 30 tablet 0 2017    Take 1 tablet by mouth once daily. - Oral    [place] [facility] [place] [address] Ph #: [phone]       olopatadine (PATANOL) 0.1 % ophthalmic solution 5 mL 3 2017    Place 1 drop into both eyes 2 (two) times daily. - Both Eyes    Pharm[place]: [facility] [address] [place] [address] Ph #: [phone]         [facility]     [facility] Care [facility] -  Assistance  Registered nurses in the [facility] provide clinical advisement, health education, appointment booking, and other advisory services.  Call for this free service at [phone].             Medications           Message regarding  Medications     Verify the changes and/or additions to your medication regime listed below are the same as discussed with your clinician today.  If any of these changes or additions are incorrect, please notify your healthcare provider.        START taking these NEW medications        Refills    lisinopril-hydrochlorothiazide (PRINZIDE,ZESTORETIC) 20-12.5 mg per tablet 0    Sig: Take 1 tablet by mouth once daily.    Class: Normal    Route: Oral    olopatadine (PATANOL) 0.1 % ophthalmic solution 3    Sig: Place 1 drop into both eyes 2 (two) times daily.    Class: Normal    Route: Both Eyes      STOP taking these medications     lisinopril (PRINIVIL,ZESTRIL) 20 MG tablet take 1 tablet by mouth once daily           Verify that the below list of medications is an accurate representation of the medications you are currently taking.  If none reported, the list may be blank. If incorrect, please contact your healthcare provider. Carry this list with you in case of emergency.           Current Medications     fenofibrate (TRICOR) 145 MG tablet take 1 tablet by mouth once daily    fluticasone (FLONASE) 50 mcg/actuation nasal spray 2 sprays by Each Nare route once daily.    hydroxychloroquine (PLAQUENIL) 200 mg tablet Take 1 tablet (200 mg total) by mouth once daily.    methocarbamol (ROBAXIN) 750 MG Tab Take 1,500 mg (two tablets) four times daily for first 2 days followed by 750mg (one tablet) four times/day.    nabumetone (RELAFEN) 750 MG tablet Take 1 tablet (750 mg total) by mouth 2 (two) times daily as needed for Pain.    naproxen (NAPROSYN) 500 MG tablet Take 1 tablet (500 mg total) by mouth 2 (two) times daily as needed (Do not take more than 2 pills in 24 hours).    lisinopril-hydrochlorothiazide (PRINZIDE,ZESTORETIC) 20-12.5 mg per tablet Take 1 tablet by mouth once daily.    olopatadine (PATANOL) 0.1 % ophthalmic solution Place 1 drop into both eyes 2 (two) times daily.           Clinical Reference Information     "       Vital Signs - Last Recorded  Most recent update: 1/27/2017  9:43 AM by Roby Santana LPN    BP Pulse Temp Ht    (!) 148/98 (BP Location: Right arm, Patient Position: Sitting, BP Method: Manual) 68 97.6 °F (36.4 °C) (Tympanic) 5' 11" (1.803 m)    Wt SpO2 BMI    110.3 kg (243 lb 2.7 oz) 99% 33.91 kg/m2      Blood Pressure          Most Recent Value    BP  (!)  148/98      Allergies as of 1/27/2017     Sulfa (Sulfonamide Antibiotics)    Sulfamethoxazole-trimethoprim      Immunizations Administered on Date of Encounter - 1/27/2017     None      [facility] Sign-Up     Activating your [facility] account is as easy as 1-2-3!     1) Visit [email or web address], select Sign Up Now, enter this activation code and your date of birth, then select Next.  [email or web address]  Expires: 2/2/2017 10:04 AM      2) Create a username and password to use when you visit [facility] in the future and select a security question in case you lose your password and select Next.    3) Enter your e-mail address and click Sign Up!    Additional Information  If you have questions, please e-mail [email or web address] or call [phone] to talk to our [facility] staff. Remember, [facility] is NOT to be used for urgent needs. For medical emergencies, dial 911.         Instructions      Understanding Red Eye: Causes  Do your eyes sometimes get red and irritated? This could be a sign of irritation or infection. The inside of your eyelid and the white of your eye are covered with a membrane called the conjunctiva. When your eye is irritated or infected, the blood vessels in this membrane swell. This condition is called conjunctivitis. It is also known as red eye or pink eye.    Irritation  Allergies and environmental irritants are common causes of inflamed eyes. Other causes can include:  · Injuries  · Objects in the eye  · Some eye drops  · Makeup  · Contact lenses  · Diseases inside the eye  Infection  Viruses and bacteria can cause eye infections. " An infection may begin in your eye. It can also start somewhere else in your body, such as your nose or throat. Sexually transmitted diseases can also cause eye infections.  © 6501-5438 [employer]. [address] [place] [address], [place] [address]. All rights reserved. This information is not intended as a substitute for professional medical care. Always follow your healthcare professional's instructions.        Understanding Noninfectious Red Eye: Treating Inflammation  Red eyes are sometimes caused by viral or bacterial infections. But inflammation in one or both eyes often happens because of allergies or environmental irritants. Here's a closer look at these two common causes of eye inflammation, and how to treat them.     Allergies  Do your eyes swell and itch when you pet a cat? Do they get red, watery, and itchy every spring or summer? If so, you may have an allergy to animals. Or you may have an  allergy to a fine powder (pollen) made by certain plants. Along with dust and mold, animals and pollen are the most common causes of allergies. Often both eyes will get inflamed when you are around whatever causes your allergy.  Treating the symptoms:  · The only cure for an allergy is to avoid the substance (allergen) that causes it.  · Eye drops and cold compresses can help reduce swelling. They can ease redness and itching.  · Symptoms often get better if you use allergy eye drops. Or if you limit your contact with the allergen.   · If your allergy is severe, your healthcare provider may prescribe oral medicine. This may include antihistamines or steroids.  · Your provider may refer you to a specialist.  Environmental irritants  Your eyes can also be irritated by things such as:  · Air pollution  · Smoke  · Fumes  · Some eye drops  · Contact lenses, especially extended-wear lenses  These environmental irritants can make your eyes blood vessels swell. Your eyelids may get red and swollen. Your eyes may  water. But they dont often itch as much as they do with allergies. Depending on the cause, one or both eyes may be inflamed.  Treating the symptoms:  · Stay away from the irritant.  · Try artificial tears to help flush out your eye. They lubricate your eyes surface.  · Ask your provider about medicated anti-inflammatory or anti-allergy eye drops. These can reduce swelling and ease redness.  © 0324-1013 [employer]. [address] [place] [address]. All rights reserved. This information is not intended as a substitute for professional medical care. Always follow your healthcare professional's instructions.        Step-by-Step  Checking Your Blood Pressure    © 0329-5704 [employer]. [address] [place] [address]. All rights reserved. This information is not intended as a substitute for professional medical care. Always follow your healthcare professional's instructions.             Smoking Cessation     If you would like to quit smoking:   You may be eligible for free services if you are a [place] resident and started smoking cigarettes before September 1, 1988.  Call the Smoking Cessation Trust (SCT) toll free at (831) 652-2522 or (861) 478-4531.   Call 5-800-QUIT-NOW if you do not meet the above criteria.

## 2017-01-27 NOTE — PATIENT INSTRUCTIONS
Understanding Red Eye: Causes  Do your eyes sometimes get red and irritated? This could be a sign of irritation or infection. The inside of your eyelid and the white of your eye are covered with a membrane called the conjunctiva. When your eye is irritated or infected, the blood vessels in this membrane swell. This condition is called conjunctivitis. It is also known as red eye or pink eye.    Irritation  Allergies and environmental irritants are common causes of inflamed eyes. Other causes can include:  · Injuries  · Objects in the eye  · Some eye drops  · Makeup  · Contact lenses  · Diseases inside the eye  Infection  Viruses and bacteria can cause eye infections. An infection may begin in your eye. It can also start somewhere else in your body, such as your nose or throat. Sexually transmitted diseases can also cause eye infections.  © 5572-5742 [employer]. [address] [place] [address]. All rights reserved. This information is not intended as a substitute for professional medical care. Always follow your healthcare professional's instructions.        Understanding Noninfectious Red Eye: Treating Inflammation  Red eyes are sometimes caused by viral or bacterial infections. But inflammation in one or both eyes often happens because of allergies or environmental irritants. Here's a closer look at these two common causes of eye inflammation, and how to treat them.     Allergies  Do your eyes swell and itch when you pet a cat? Do they get red, watery, and itchy every spring or summer? If so, you may have an allergy to animals. Or you may have an  allergy to a fine powder (pollen) made by certain plants. Along with dust and mold, animals and pollen are the most common causes of allergies. Often both eyes will get inflamed when you are around whatever causes your allergy.  Treating the symptoms:  · The only cure for an allergy is to avoid the substance (allergen) that causes it.  · Eye  drops and cold compresses can help reduce swelling. They can ease redness and itching.  · Symptoms often get better if you use allergy eye drops. Or if you limit your contact with the allergen.   · If your allergy is severe, your healthcare provider may prescribe oral medicine. This may include antihistamines or steroids.  · Your provider may refer you to a specialist.  Environmental irritants  Your eyes can also be irritated by things such as:  · Air pollution  · Smoke  · Fumes  · Some eye drops  · Contact lenses, especially extended-wear lenses  These environmental irritants can make your eyes blood vessels swell. Your eyelids may get red and swollen. Your eyes may water. But they dont often itch as much as they do with allergies. Depending on the cause, one or both eyes may be inflamed.  Treating the symptoms:  · Stay away from the irritant.  · Try artificial tears to help flush out your eye. They lubricate your eyes surface.  · Ask your provider about medicated anti-inflammatory or anti-allergy eye drops. These can reduce swelling and ease redness.  © 3022-7505 [employer]. [address] [place] [address]. All rights reserved. This information is not intended as a substitute for professional medical care. Always follow your healthcare professional's instructions.        Step-by-Step  Checking Your Blood Pressure    © 4522-5844 [employer]. [address] [place] [address]. All rights reserved. This information is not intended as a substitute for professional medical care. Always follow your healthcare professional's instructions.

## 2017-01-30 RX ORDER — CYCLOSPORINE 0.5 MG/ML
1 EMULSION OPHTHALMIC 2 TIMES DAILY
Qty: 180 VIAL | Refills: 4 | Status: SHIPPED | OUTPATIENT
Start: 2017-01-30 | End: 2021-04-13

## 2017-01-30 RX ORDER — LOTEPREDNOL ETABONATE 5 MG/ML
1 SUSPENSION/ DROPS OPHTHALMIC 4 TIMES DAILY
Qty: 5 ML | Refills: 1 | Status: SHIPPED | OUTPATIENT
Start: 2017-01-30 | End: 2017-03-27 | Stop reason: SDUPTHER

## 2017-01-30 NOTE — TELEPHONE ENCOUNTER
----- Message from Idalmis Santana sent at 1/30/2017 10:00 AM CST -----  Contact: pt  [phone] would like to speak with staff about getting something phoned in pt was seen recently and told if the refresh did not work to call and he would prescribe something pt is still having red eyes pls advise

## 2017-01-30 NOTE — TELEPHONE ENCOUNTER
Spoke with patient and explained dry eye treatment of lotemax and restasis. Also scheduled 2 month dry eye f/u.

## 2017-03-28 RX ORDER — LOTEPREDNOL ETABONATE 5 MG/ML
SUSPENSION/ DROPS OPHTHALMIC
Qty: 5 ML | Refills: 1 | Status: SHIPPED | OUTPATIENT
Start: 2017-03-28 | End: 2017-04-07 | Stop reason: ALTCHOICE

## 2017-04-07 ENCOUNTER — OFFICE VISIT (OUTPATIENT)
Dept: OPHTHALMOLOGY | Facility: CLINIC | Age: 42
End: 2017-04-07
Payer: COMMERCIAL

## 2017-04-07 DIAGNOSIS — M35.9 UNDIFFERENTIATED CONNECTIVE TISSUE DISEASE: ICD-10-CM

## 2017-04-07 DIAGNOSIS — Z79.899 LONG-TERM USE OF PLAQUENIL: ICD-10-CM

## 2017-04-07 DIAGNOSIS — H04.123 DRY EYES, BILATERAL: Primary | ICD-10-CM

## 2017-04-07 PROCEDURE — 99999 PR PBB SHADOW E&M-EST. PATIENT-LVL II: CPT | Mod: PBBFAC,,, | Performed by: OPTOMETRIST

## 2017-04-07 PROCEDURE — 92012 INTRM OPH EXAM EST PATIENT: CPT | Mod: S$GLB,,, | Performed by: OPTOMETRIST

## 2017-04-07 RX ORDER — LOTEPREDNOL ETABONATE 5 MG/G
1 GEL OPHTHALMIC 4 TIMES DAILY
Qty: 5 G | Refills: 0 | Status: SHIPPED | OUTPATIENT
Start: 2017-04-07 | End: 2017-04-21

## 2017-04-07 RX ORDER — OLOPATADINE HYDROCHLORIDE 1 MG/ML
1 SOLUTION/ DROPS OPHTHALMIC 2 TIMES DAILY
Refills: 0 | COMMUNITY
Start: 2017-03-10 | End: 2017-07-13

## 2017-04-07 RX ORDER — LISINOPRIL AND HYDROCHLOROTHIAZIDE 12.5; 2 MG/1; MG/1
1 TABLET ORAL DAILY
Refills: 0 | COMMUNITY
Start: 2017-03-10 | End: 2017-04-30 | Stop reason: SDUPTHER

## 2017-04-07 NOTE — PROGRESS NOTES
HPI     Follow-up    Additional comments: 2m dry eye           Eye Problem    Additional comments: redness           Comments   Pt was last seen 12/13/16 by dnl. Currently using lotemax and restasis.   States that eyes are feeling a little better, but still having trouble   with redness.  Was given a rx for intermediate/computer use, may be   interested in distance only glasses.        Last edited by Laila Chicas on 4/7/2017  2:53 PM. (History)            Assessment /Plan     For exam results, see Encounter Report.    Dry eyes, bilateral  -     loteprednol etabonate (LOTEMAX) 0.5 % DrpG; Place 1 drop into both eyes 4 (four) times daily.  Dispense: 5 g; Refill: 0    Undifferentiated connective tissue disease    Long-term use of Plaquenil    Mild improvement in symptoms since starting restasis but still significant redness at times  Pt reports being around chemicals at work  No k staining but +deminished tear lake    Plan: 1. Continue restasis bid OU   2. Resume lotemax gel qid OU x 2 wks, then bid OU x 2 wks, then d/c   3. systane prn OU    RTC 3 months for 10-2VF, mOCT and dilate  Discussed above and all questions were answered.

## 2017-04-17 ENCOUNTER — TELEPHONE (OUTPATIENT)
Dept: RHEUMATOLOGY | Facility: CLINIC | Age: 42
End: 2017-04-17

## 2017-04-17 NOTE — TELEPHONE ENCOUNTER
----- Message from Lauren Nagel sent at 4/17/2017  7:52 AM CDT -----  Contact: PT  States he is on vacation Thursday and he would like to see if he can come in on Thursday. Please call pt at [phone]. Thank you

## 2017-04-20 ENCOUNTER — OFFICE VISIT (OUTPATIENT)
Dept: RHEUMATOLOGY | Facility: CLINIC | Age: 42
End: 2017-04-20
Payer: COMMERCIAL

## 2017-04-20 ENCOUNTER — LAB VISIT (OUTPATIENT)
Dept: LAB | Facility: HOSPITAL | Age: 42
End: 2017-04-20
Attending: INTERNAL MEDICINE
Payer: COMMERCIAL

## 2017-04-20 VITALS
SYSTOLIC BLOOD PRESSURE: 143 MMHG | WEIGHT: 239.88 LBS | HEART RATE: 85 BPM | BODY MASS INDEX: 33.58 KG/M2 | DIASTOLIC BLOOD PRESSURE: 91 MMHG | HEIGHT: 71 IN

## 2017-04-20 DIAGNOSIS — M35.9 UNDIFFERENTIATED CONNECTIVE TISSUE DISEASE: ICD-10-CM

## 2017-04-20 DIAGNOSIS — M79.642 PAIN IN BOTH HANDS: ICD-10-CM

## 2017-04-20 DIAGNOSIS — M79.641 PAIN IN BOTH HANDS: ICD-10-CM

## 2017-04-20 DIAGNOSIS — M35.01 SJOGREN'S SYNDROME WITH KERATOCONJUNCTIVITIS SICCA: ICD-10-CM

## 2017-04-20 DIAGNOSIS — M79.642 PAIN IN BOTH HANDS: Primary | ICD-10-CM

## 2017-04-20 DIAGNOSIS — M79.641 PAIN IN BOTH HANDS: Primary | ICD-10-CM

## 2017-04-20 PROBLEM — M35.00 SJOGREN'S SYNDROME: Status: ACTIVE | Noted: 2017-04-20

## 2017-04-20 LAB
ALBUMIN SERPL BCP-MCNC: 4 G/DL
ALP SERPL-CCNC: 56 U/L
ALT SERPL W/O P-5'-P-CCNC: 25 U/L
ANION GAP SERPL CALC-SCNC: 9 MMOL/L
AST SERPL-CCNC: 28 U/L
BASOPHILS # BLD AUTO: 0.03 K/UL
BASOPHILS NFR BLD: 0.7 %
BILIRUB SERPL-MCNC: 0.4 MG/DL
BUN SERPL-MCNC: 16 MG/DL
C3 SERPL-MCNC: 126 MG/DL
C4 SERPL-MCNC: 34 MG/DL
CALCIUM SERPL-MCNC: 9 MG/DL
CCP AB SER IA-ACNC: <0.5 U/ML
CHLORIDE SERPL-SCNC: 109 MMOL/L
CO2 SERPL-SCNC: 23 MMOL/L
CREAT SERPL-MCNC: 1 MG/DL
CREAT UR-MCNC: 119 MG/DL
CRP SERPL-MCNC: 2.9 MG/L
DIFFERENTIAL METHOD: ABNORMAL
EOSINOPHIL # BLD AUTO: 0.2 K/UL
EOSINOPHIL NFR BLD: 4.3 %
ERYTHROCYTE [DISTWIDTH] IN BLOOD BY AUTOMATED COUNT: 14 %
ERYTHROCYTE [SEDIMENTATION RATE] IN BLOOD BY WESTERGREN METHOD: 2 MM/HR
EST. GFR  (AFRICAN AMERICAN): >60 ML/MIN/1.73 M^2
EST. GFR  (NON AFRICAN AMERICAN): >60 ML/MIN/1.73 M^2
GLUCOSE SERPL-MCNC: 94 MG/DL
HCT VFR BLD AUTO: 43.4 %
HGB BLD-MCNC: 14.6 G/DL
LYMPHOCYTES # BLD AUTO: 1.9 K/UL
LYMPHOCYTES NFR BLD: 45.5 %
MCH RBC QN AUTO: 30.5 PG
MCHC RBC AUTO-ENTMCNC: 33.6 %
MCV RBC AUTO: 91 FL
MICROSCOPIC COMMENT: NORMAL
MONOCYTES # BLD AUTO: 0.4 K/UL
MONOCYTES NFR BLD: 8.6 %
NEUTROPHILS # BLD AUTO: 1.7 K/UL
NEUTROPHILS NFR BLD: 40.9 %
PLATELET # BLD AUTO: 202 K/UL
PMV BLD AUTO: 10.5 FL
POTASSIUM SERPL-SCNC: 4.3 MMOL/L
PROT SERPL-MCNC: 6.9 G/DL
PROT UR-MCNC: <7 MG/DL
PROT/CREAT RATIO, UR: NORMAL
RBC # BLD AUTO: 4.78 M/UL
RBC #/AREA URNS HPF: 1 /HPF (ref 0–4)
SODIUM SERPL-SCNC: 141 MMOL/L
WBC # BLD AUTO: 4.18 K/UL
WBC #/AREA URNS HPF: 2 /HPF (ref 0–5)

## 2017-04-20 PROCEDURE — 3077F SYST BP >= 140 MM HG: CPT | Mod: S$GLB,,, | Performed by: INTERNAL MEDICINE

## 2017-04-20 PROCEDURE — 86140 C-REACTIVE PROTEIN: CPT

## 2017-04-20 PROCEDURE — 81000 URINALYSIS NONAUTO W/SCOPE: CPT | Mod: PO

## 2017-04-20 PROCEDURE — 86160 COMPLEMENT ANTIGEN: CPT | Mod: 59

## 2017-04-20 PROCEDURE — 36415 COLL VENOUS BLD VENIPUNCTURE: CPT | Mod: PO

## 2017-04-20 PROCEDURE — 1160F RVW MEDS BY RX/DR IN RCRD: CPT | Mod: S$GLB,,, | Performed by: INTERNAL MEDICINE

## 2017-04-20 PROCEDURE — 86160 COMPLEMENT ANTIGEN: CPT

## 2017-04-20 PROCEDURE — 99999 PR PBB SHADOW E&M-EST. PATIENT-LVL III: CPT | Mod: PBBFAC,,, | Performed by: INTERNAL MEDICINE

## 2017-04-20 PROCEDURE — 85651 RBC SED RATE NONAUTOMATED: CPT | Mod: PO

## 2017-04-20 PROCEDURE — 3080F DIAST BP >= 90 MM HG: CPT | Mod: S$GLB,,, | Performed by: INTERNAL MEDICINE

## 2017-04-20 PROCEDURE — 86200 CCP ANTIBODY: CPT

## 2017-04-20 PROCEDURE — 86038 ANTINUCLEAR ANTIBODIES: CPT

## 2017-04-20 PROCEDURE — 85025 COMPLETE CBC W/AUTO DIFF WBC: CPT | Mod: PO

## 2017-04-20 PROCEDURE — 99214 OFFICE O/P EST MOD 30 MIN: CPT | Mod: S$GLB,,, | Performed by: INTERNAL MEDICINE

## 2017-04-20 PROCEDURE — 80053 COMPREHEN METABOLIC PANEL: CPT | Mod: PO

## 2017-04-20 PROCEDURE — 84156 ASSAY OF PROTEIN URINE: CPT

## 2017-04-20 RX ORDER — HYDROXYCHLOROQUINE SULFATE 200 MG/1
400 TABLET, FILM COATED ORAL DAILY
Qty: 180 TABLET | Refills: 1 | Status: SHIPPED | OUTPATIENT
Start: 2017-04-20 | End: 2018-05-01 | Stop reason: SDUPTHER
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
## 2017-04-20 NOTE — MR AVS SNAPSHOT
[facility] Rheumatology  [address]  [place] [address]  Phone: [phone]  Fax: [phone]                  Yoandy Forde   2017 9:30 AM   Office Visit    Description:  Male : 1975   Provider:  Nerissa Dunn MD   Department:  [facility] - Rheumatology           Reason for Visit     Osteoarthritis     UCTD           Diagnoses this Visit        Comments    Pain in both hands    -  Primary     Undifferentiated connective tissue disease         Sjogren's syndrome with keratoconjunctivitis sicca                To Do List           Future Appointments        Provider Department Dept Phone    2017 8:20 AM Mary Feliciano MD [facility] Internal Medicine [phone]    2017 12:30 PM FIELDS, VISUAL-ONE [facility] Ophthalmology [phone]    2017 1:00 PM Trav Moreno OD [facility] Ophthalmology [phone]    2017 9:35 AM LABORATORY, [facility] [phone]    2017 10:00 AM Cherie Pena PA-C [facility] Rhe[facility] [phone]      Goals (5 Years of Data)     None       These Medications        Disp Refills Start End    hydroxychloroquine (PLAQUENIL) 200 mg tablet 180 tablet 1 2017     Take 2 tablets (400 mg total) by mouth once daily. - Oral    [facility] [address] Ph #: [phone]         [facility]     [facility] Care [facility] -  Assistance  Unless otherwise directed by your provider, please contact [facility], our nurse care line that is available for  assistance.     Registered nurses in the [facility] provide: appointment scheduling, clinical advisement, health education, and other advisory services.  Call: [phone] (toll free)               Medications           Message regarding Medications     Verify the changes and/or additions to your medication regime listed below are the same as discussed with your clinician today.  If any  "of these changes or additions are incorrect, please notify your healthcare provider.        START taking these NEW medications        Refills    hydroxychloroquine (PLAQUENIL) 200 mg tablet 1    Sig: Take 2 tablets (400 mg total) by mouth once daily.    Class: Normal    Route: Oral           Verify that the below list of medications is an accurate representation of the medications you are currently taking.  If none reported, the list may be blank. If incorrect, please contact your healthcare provider. Carry this list with you in case of emergency.           Current Medications     cycloSPORINE (RESTASIS) 0.05 % ophthalmic emulsion Place 0.4 mLs (1 drop total) into both eyes 2 (two) times daily.    fenofibrate (TRICOR) 145 MG tablet take 1 tablet by mouth once daily    fluticasone (FLONASE) 50 mcg/actuation nasal spray 2 sprays by Each Nare route once daily.    lisinopril-hydrochlorothiazide (PRINZIDE,ZESTORETIC) 20-12.5 mg per tablet Take 1 tablet by mouth once daily.    loteprednol etabonate (LOTEMAX) 0.5 % DrpG Place 1 drop into both eyes 4 (four) times daily.    methocarbamol (ROBAXIN) 750 MG Tab Take 1,500 mg (two tablets) four times daily for first 2 days followed by 750mg (one tablet) four times/day.    nabumetone (RELAFEN) 750 MG tablet Take 1 tablet (750 mg total) by mouth 2 (two) times daily as needed for Pain.    olopatadine (PATANOL) 0.1 % ophthalmic solution Place 1 drop into both eyes 2 (two) times daily.    hydroxychloroquine (PLAQUENIL) 200 mg tablet Take 2 tablets (400 mg total) by mouth once daily.           Clinical Reference Information           Your Vitals Were     BP Pulse Height Weight BMI    143/91 85 5' 11" (1.803 m) 108.8 kg (239 lb 13.8 oz) 33.45 kg/m2      Blood Pressure          Most Recent Value    BP  (!)  143/91      Allergies as of 4/20/2017     Sulfa (Sulfonamide Antibiotics)    Sulfamethoxazole-trimethoprim      Immunizations Administered on Date of Encounter - 4/20/2017     None "      Orders Placed During Today's Visit     Future Labs/Procedures Expected by Expires    Cyclic citrul peptide antibody, IgG  4/20/2017 6/19/2018    Rheumatoid factor  4/20/2017 4/20/2018      [facility] Sign-Up     Activating your [facility] account is as easy as 1-2-3!     1) Visit [email or web address], select Sign Up Now, enter this activation code and your date of birth, then select Next.  [email or web address]  Expires: 6/4/2017 10:23 AM      2) Create a username and password to use when you visit [facility] in the future and select a security question in case you lose your password and select Next.    3) Enter your e-mail address and click Sign Up!    Additional Information  If you have questions, please e-mail [email or web address] or call [phone] to talk to our [facility] staff. Remember, [facility] is NOT to be used for urgent needs. For medical emergencies, dial 911.         Smoking Cessation     If you would like to quit smoking:   You may be eligible for free services if you are a [place] resident and started smoking cigarettes before September 1, 1988.  Call the Smoking Cessation Trust ([facility]) toll free at (586) 766-4384 or (562) 927-7714.   Call 0-800-QUIT-NOW if you do not meet the above criteria.   Contact us via email: [email or web address]   View our website for more information: [email or web address]        Language Assistance Services     ATTENTION: Language assistance services are available, free of charge. Please call [phone].      ATENCIÓN: Si habla español, tiene a ledezma disposición servicios gratuitos de asistencia lingüística. Llame al [phone].     CHÚ Ý: N?u b?n nói Ti?ng Vi?t, có các d?ch v? h? tr? ngôn ng? mi?n phí dành cho b?n. G?i s? [phone].         [facility] complies with applicable Federal civil rights laws and does not discriminate on the basis of race, color, national origin, age, disability, or sex.

## 2017-04-21 LAB — ANA SER QL IF: NORMAL

## 2017-04-24 ENCOUNTER — TELEPHONE (OUTPATIENT)
Dept: RHEUMATOLOGY | Facility: CLINIC | Age: 42
End: 2017-04-24

## 2017-04-24 NOTE — TELEPHONE ENCOUNTER
Called pt to discuss bloodwork and his repeat DONG negative, CCP negative I think he is turning into Sjogren's with positive SSA, mild joint pain that hopefully will respond to higher plaquenil dosing. Will continue to monitor and mail hand out on Sjogren's to his house.  He voiced understanding  MB

## 2017-05-01 RX ORDER — LISINOPRIL AND HYDROCHLOROTHIAZIDE 12.5; 2 MG/1; MG/1
TABLET ORAL
Qty: 30 TABLET | Refills: 0 | Status: SHIPPED | OUTPATIENT
Start: 2017-05-01 | End: 2017-06-11 | Stop reason: SDUPTHER

## 2017-06-12 RX ORDER — LISINOPRIL AND HYDROCHLOROTHIAZIDE 12.5; 2 MG/1; MG/1
TABLET ORAL
Qty: 90 TABLET | Refills: 0 | Status: SHIPPED | OUTPATIENT
Start: 2017-06-12 | End: 2017-09-14 | Stop reason: SDUPTHER

## 2017-07-13 ENCOUNTER — OFFICE VISIT (OUTPATIENT)
Dept: OPHTHALMOLOGY | Facility: CLINIC | Age: 42
End: 2017-07-13
Payer: COMMERCIAL

## 2017-07-13 DIAGNOSIS — Z79.899 LONG-TERM USE OF PLAQUENIL: ICD-10-CM

## 2017-07-13 DIAGNOSIS — M35.1 MCTD (MIXED CONNECTIVE TISSUE DISEASE): ICD-10-CM

## 2017-07-13 DIAGNOSIS — M35.01 KERATOCONJUNCTIVITIS SICCA, IN SJOGREN'S SYNDROME: Primary | ICD-10-CM

## 2017-07-13 PROCEDURE — 92083 EXTENDED VISUAL FIELD XM: CPT | Mod: S$GLB,,, | Performed by: OPTOMETRIST

## 2017-07-13 PROCEDURE — 92014 COMPRE OPH EXAM EST PT 1/>: CPT | Mod: S$GLB,,, | Performed by: OPTOMETRIST

## 2017-07-13 PROCEDURE — 92134 CPTRZ OPH DX IMG PST SGM RTA: CPT | Mod: S$GLB,,, | Performed by: OPTOMETRIST

## 2017-07-13 NOTE — PROGRESS NOTES
HPI     Follow-up    Additional comments: 3 month Plaquenil           Comments   Last seen by DNL on 4/7/17 for Dry Eyes. Patient here today for 3 month   10-2VF, mOCT, and DFE. Patient states he's still using restasis and   systane.  Pt here for plaquenil baseline examination  Dose 200mg bid   Started 2010 for undifferentiated connective tissue disease  No significant change in vision recently         Last edited by Irena Higgins, PCT on 7/13/2017  1:09 PM. (History)              Assessment /Plan     For exam results, see Encounter Report.    Keratoconjunctivitis sicca, in Sjogren's syndrome    MCTD (mixed connective tissue disease)  -     [place] Visual Field - OU - Extended - Both Eyes  -     Posterior Segment OCT Retina-Both eyes    Long-term use of Plaquenil  -     [place] Visual Field - OU - Extended - Both Eyes  -     Posterior Segment OCT Retina-Both eyes    Dry eye doing well at this time with occasional redness per pt but dry symptoms decreased with restasis and AT  Discussed adding pulse lotemax if symptoms return, pt to call or RTC     Otherwise, no maculopathy present in either eye today  Monitor 6 months    RTC 6 months for mOCT and dilation, PRN sooner with any changes or concerns  Discussed above and all questions were answered.

## 2017-09-07 ENCOUNTER — LAB VISIT (OUTPATIENT)
Dept: LAB | Facility: HOSPITAL | Age: 42
End: 2017-09-07
Attending: PHYSICIAN ASSISTANT
Payer: COMMERCIAL

## 2017-09-07 ENCOUNTER — OFFICE VISIT (OUTPATIENT)
Dept: RHEUMATOLOGY | Facility: CLINIC | Age: 42
End: 2017-09-07
Payer: COMMERCIAL

## 2017-09-07 VITALS
SYSTOLIC BLOOD PRESSURE: 152 MMHG | BODY MASS INDEX: 33.27 KG/M2 | DIASTOLIC BLOOD PRESSURE: 89 MMHG | WEIGHT: 238.56 LBS | HEART RATE: 83 BPM

## 2017-09-07 DIAGNOSIS — M35.01 SJOGREN'S SYNDROME WITH KERATOCONJUNCTIVITIS SICCA: Primary | ICD-10-CM

## 2017-09-07 DIAGNOSIS — M79.642 PAIN IN BOTH HANDS: ICD-10-CM

## 2017-09-07 DIAGNOSIS — M79.641 PAIN IN BOTH HANDS: ICD-10-CM

## 2017-09-07 DIAGNOSIS — M35.9 UNDIFFERENTIATED CONNECTIVE TISSUE DISEASE: ICD-10-CM

## 2017-09-07 DIAGNOSIS — Z51.81 MEDICATION MONITORING ENCOUNTER: ICD-10-CM

## 2017-09-07 LAB
ALBUMIN SERPL BCP-MCNC: 4.1 G/DL
ALP SERPL-CCNC: 62 U/L
ALT SERPL W/O P-5'-P-CCNC: 21 U/L
ANION GAP SERPL CALC-SCNC: 10 MMOL/L
AST SERPL-CCNC: 26 U/L
BASOPHILS # BLD AUTO: 0.02 K/UL
BASOPHILS NFR BLD: 0.4 %
BILIRUB SERPL-MCNC: 0.4 MG/DL
BUN SERPL-MCNC: 14 MG/DL
CALCIUM SERPL-MCNC: 9.4 MG/DL
CHLORIDE SERPL-SCNC: 104 MMOL/L
CO2 SERPL-SCNC: 25 MMOL/L
CREAT SERPL-MCNC: 1.1 MG/DL
CRP SERPL-MCNC: 2.8 MG/L
DIFFERENTIAL METHOD: ABNORMAL
EOSINOPHIL # BLD AUTO: 0.2 K/UL
EOSINOPHIL NFR BLD: 4.2 %
ERYTHROCYTE [DISTWIDTH] IN BLOOD BY AUTOMATED COUNT: 13.5 %
ERYTHROCYTE [SEDIMENTATION RATE] IN BLOOD BY WESTERGREN METHOD: 1 MM/HR
EST. GFR  (AFRICAN AMERICAN): >60 ML/MIN/1.73 M^2
EST. GFR  (NON AFRICAN AMERICAN): >60 ML/MIN/1.73 M^2
GLUCOSE SERPL-MCNC: 111 MG/DL
HCT VFR BLD AUTO: 44.7 %
HGB BLD-MCNC: 15.9 G/DL
LYMPHOCYTES # BLD AUTO: 2.6 K/UL
LYMPHOCYTES NFR BLD: 45.1 %
MCH RBC QN AUTO: 31.7 PG
MCHC RBC AUTO-ENTMCNC: 35.6 G/DL
MCV RBC AUTO: 89 FL
MONOCYTES # BLD AUTO: 0.4 K/UL
MONOCYTES NFR BLD: 7.4 %
NEUTROPHILS # BLD AUTO: 2.5 K/UL
NEUTROPHILS NFR BLD: 42.9 %
PLATELET # BLD AUTO: 223 K/UL
PMV BLD AUTO: 10.6 FL
POTASSIUM SERPL-SCNC: 3.9 MMOL/L
PROT SERPL-MCNC: 7.4 G/DL
RBC # BLD AUTO: 5.02 M/UL
RHEUMATOID FACT SERPL-ACNC: <10 IU/ML
SODIUM SERPL-SCNC: 139 MMOL/L
WBC # BLD AUTO: 5.7 K/UL

## 2017-09-07 PROCEDURE — 36415 COLL VENOUS BLD VENIPUNCTURE: CPT | Mod: PO

## 2017-09-07 PROCEDURE — 99214 OFFICE O/P EST MOD 30 MIN: CPT | Mod: S$GLB,,, | Performed by: PHYSICIAN ASSISTANT

## 2017-09-07 PROCEDURE — 85025 COMPLETE CBC W/AUTO DIFF WBC: CPT | Mod: PO

## 2017-09-07 PROCEDURE — 80053 COMPREHEN METABOLIC PANEL: CPT | Mod: PO

## 2017-09-07 PROCEDURE — 86431 RHEUMATOID FACTOR QUANT: CPT

## 2017-09-07 PROCEDURE — 86140 C-REACTIVE PROTEIN: CPT

## 2017-09-07 PROCEDURE — 85651 RBC SED RATE NONAUTOMATED: CPT | Mod: PO

## 2017-09-07 PROCEDURE — 3077F SYST BP >= 140 MM HG: CPT | Mod: S$GLB,,, | Performed by: PHYSICIAN ASSISTANT

## 2017-09-07 PROCEDURE — 3008F BODY MASS INDEX DOCD: CPT | Mod: S$GLB,,, | Performed by: PHYSICIAN ASSISTANT

## 2017-09-07 PROCEDURE — 99999 PR PBB SHADOW E&M-EST. PATIENT-LVL III: CPT | Mod: PBBFAC,,, | Performed by: PHYSICIAN ASSISTANT

## 2017-09-07 PROCEDURE — 3079F DIAST BP 80-89 MM HG: CPT | Mod: S$GLB,,, | Performed by: PHYSICIAN ASSISTANT

## 2017-09-07 NOTE — PATIENT INSTRUCTIONS
Tumeric 1000mg/day over the counter--take this daily for joints    Continue plaquenil 400mg/day     Cont eye checks yearly    Let me know about pennsaid if you want a bottle

## 2017-09-07 NOTE — PROGRESS NOTES
Subjective:       Patient ID: Yoandy Forde is a 42 y.o. male.    Chief Complaint: Sjogrens/uctd    Yoandy was previously seen by Dr. DONNELLY with a diagnosis of UCTD that was looking more like primary Sjogrens with +SSA, dry eyes, and arthralgia. + previous positive DONG with +SSA done in 2012 but last few checks were normal.  He takes plaquenil 400mg/day with no issues.  Uses restasis for his dry eyes, he c/o mild dry mouth.  Denies Raynauds. Avoids nsaids due to blood pressure. C/o right elbow pain and tati 1 mcp pain today. His joint pains are worse some days then others. No real acute swelling.  His hands are puffy. Denies chest pain, sob.  Has mild gerd but takes tums occasionally and this helps.       Review of Systems   Constitutional: Negative for chills, fatigue, fever and unexpected weight change.   HENT: Negative for congestion, mouth sores and rhinorrhea.         Dry  mouth   Eyes: Negative for pain, discharge and redness.        Dry eyes   Respiratory: Negative for cough and shortness of breath.    Cardiovascular: Negative for chest pain and leg swelling.   Gastrointestinal: Negative for abdominal pain, diarrhea, nausea and vomiting.        Mild gerd   Endocrine: Negative for cold intolerance and heat intolerance.   Genitourinary: Negative for dysuria.   Musculoskeletal: Positive for arthralgias. Negative for joint swelling and myalgias.   Skin: Negative for rash.   Allergic/Immunologic: Negative for immunocompromised state.   Neurological: Negative for weakness and headaches.   Psychiatric/Behavioral: The patient is not nervous/anxious.          Objective:   BP (!) 152/89   Pulse 83   Wt 108.2 kg (238 lb 8.6 oz)   BMI 33.27 kg/m²      Physical Exam   Constitutional: He is oriented to person, place, and time and well-developed, well-nourished, and in no distress.   HENT:   Head: Normocephalic and atraumatic.   Eyes: Pupils are equal, round, and reactive to light. Right eye exhibits no discharge.   Neck: Normal  range of motion.   Cardiovascular: Normal rate, regular rhythm and normal heart sounds.  Exam reveals no friction rub.    Pulmonary/Chest: Effort normal and breath sounds normal. No respiratory distress.   Abdominal: Soft. He exhibits no distension. There is no tenderness.   Lymphadenopathy:     He has no cervical adenopathy.   Neurological: He is alert and oriented to person, place, and time.   Skin: No rash noted. No erythema.     No raynauds, does look like some telangiectasia to his bilateral palms   Psychiatric: Mood normal.   Musculoskeletal: Normal range of motion. He exhibits no edema or deformity.   Hands are puffy bilaterally, no synovitis noted  Tenderness to tati 1 mcp joints and right elbow, full rom to all joints    tati knees with no effusion, no warmth or tenderness         Recent Results (from the past 168 hour(s))   Comprehensive metabolic panel    Collection Time: 09/07/17  7:05 AM   Result Value Ref Range    Sodium 139 136 - 145 mmol/L    Potassium 3.9 3.5 - 5.1 mmol/L    Chloride 104 95 - 110 mmol/L    CO2 25 23 - 29 mmol/L    Glucose 111 (H) 70 - 110 mg/dL    BUN, Bld 14 6 - 20 mg/dL    Creatinine 1.1 0.5 - 1.4 mg/dL    Calcium 9.4 8.7 - 10.5 mg/dL    Total Protein 7.4 6.0 - 8.4 g/dL    Albumin 4.1 3.5 - 5.2 g/dL    Total Bilirubin 0.4 0.1 - 1.0 mg/dL    Alkaline Phosphatase 62 55 - 135 U/L    AST 26 10 - 40 U/L    ALT 21 10 - 44 U/L    Anion Gap 10 8 - 16 mmol/L    eGFR if African American >60 >60 mL/min/1.73 m^2    eGFR if non African American >60 >60 mL/min/1.73 m^2   CBC auto differential    Collection Time: 09/07/17  7:05 AM   Result Value Ref Range    WBC 5.70 3.90 - 12.70 K/uL    RBC 5.02 4.60 - 6.20 M/uL    Hemoglobin 15.9 14.0 - 18.0 g/dL    Hematocrit 44.7 40.0 - 54.0 %    MCV 89 82 - 98 fL    MCH 31.7 (H) 27.0 - 31.0 pg    MCHC 35.6 32.0 - 36.0 g/dL    RDW 13.5 11.5 - 14.5 %    Platelets 223 150 - 350 K/uL    MPV 10.6 9.2 - 12.9 fL    Gran # 2.5 1.8 - 7.7 K/uL    Lymph # 2.6 1.0 - 4.8  K/uL    Mono # 0.4 0.3 - 1.0 K/uL    Eos # 0.2 0.0 - 0.5 K/uL    Baso # 0.02 0.00 - 0.20 K/uL    Gran% 42.9 38.0 - 73.0 %    Lymph% 45.1 18.0 - 48.0 %    Mono% 7.4 4.0 - 15.0 %    Eosinophil% 4.2 0.0 - 8.0 %    Basophil% 0.4 0.0 - 1.9 %    Differential Method Automated         Results for FREDRICK SAVAGE (MRN [id number]) as of 9/7/2017 07:37   Ref. Range 8/9/2012 14:03   MARCELA Latest Ref Range: Neg <1:160  Pos, Titer to follow (A)   MARCELA HEP-2 Titer Latest Ref Range: Neg <1:160 titer Pos 1:160 (A)   MARCELA Hep-2 Pattern Unknown Nucleolar (A)   Anti-SSA Antibody Latest Ref Range: <20 EU 49.55   Anti-SSA Interpretation Latest Ref Range: Negative  Positive (A)   Anti-SSB Antibody Latest Ref Range: <20 EU 2.39   Anti-SSB Interpretation Latest Ref Range: Negative  Negative   ds DNA Ab Latest Ref Range: Negative Titer Negative   Anti Sm Antibody Latest Ref Range: <20 EU 2.64   Anti-Sm Interpretation Latest Ref Range: Negative  Negative   Anti Sm/RNP Antibody Latest Ref Range: <20 EU 0.98   Anti-Sm/RNP Interpretation Latest Ref Range: Negative  Negative     Assessment:       1. Sjogren's syndrome with keratoconjunctivitis sicca    2. Medication monitoring encounter        Impression:  UCTD history likely primary Sjogrens with prev(2012) +marcela 1:160 nucleolor and +SSA, last MARCELA negative (4/2017): dry eyes, mild dry mouth; arthralgias, plaquenil 400mg/d; stable, no signs of progression, monitoring for Scl with previous nucleolar pattern  Arthralgia: no synovitis on exam, avoids nsaids due to htn  Medication monitoring: no signs of toxicity with plaquenil    Plan:       Consider pennsaid for elbow and 1 mcp joint pain  Continue plaquenil 200mg bid  rec tumeric 1000mg/day   Cont yearly eye checks    rtc 4 mos with reg 4, urine, ssa ssb  Return sooner if new or worsening symptoms

## 2017-09-14 RX ORDER — LISINOPRIL AND HYDROCHLOROTHIAZIDE 12.5; 2 MG/1; MG/1
1 TABLET ORAL DAILY
Qty: 30 TABLET | Refills: 0 | Status: SHIPPED | OUTPATIENT
Start: 2017-09-14 | End: 2017-11-29 | Stop reason: SDUPTHER

## 2017-09-14 NOTE — TELEPHONE ENCOUNTER
Scheduled appt with Dr. Feliciano on 9/20/2017 at 1540 as requested by provider for further refill on BP meds.//ddw

## 2017-09-20 ENCOUNTER — OFFICE VISIT (OUTPATIENT)
Dept: INTERNAL MEDICINE | Facility: CLINIC | Age: 42
End: 2017-09-20
Payer: COMMERCIAL

## 2017-09-20 VITALS
TEMPERATURE: 98 F | SYSTOLIC BLOOD PRESSURE: 120 MMHG | HEART RATE: 95 BPM | WEIGHT: 236.75 LBS | HEIGHT: 71 IN | DIASTOLIC BLOOD PRESSURE: 84 MMHG | BODY MASS INDEX: 33.15 KG/M2

## 2017-09-20 DIAGNOSIS — E66.9 OBESITY (BMI 30.0-34.9): ICD-10-CM

## 2017-09-20 DIAGNOSIS — M35.9 UNDIFFERENTIATED CONNECTIVE TISSUE DISEASE: ICD-10-CM

## 2017-09-20 DIAGNOSIS — M46.1 SACROILIAC INFLAMMATION: ICD-10-CM

## 2017-09-20 DIAGNOSIS — Z00.00 ROUTINE GENERAL MEDICAL EXAMINATION AT A HEALTH CARE FACILITY: Primary | ICD-10-CM

## 2017-09-20 DIAGNOSIS — E78.2 MIXED HYPERLIPIDEMIA: ICD-10-CM

## 2017-09-20 DIAGNOSIS — Z72.0 TOBACCO ABUSE: ICD-10-CM

## 2017-09-20 DIAGNOSIS — K21.9 GASTROESOPHAGEAL REFLUX DISEASE, ESOPHAGITIS PRESENCE NOT SPECIFIED: ICD-10-CM

## 2017-09-20 DIAGNOSIS — I10 ESSENTIAL HYPERTENSION: ICD-10-CM

## 2017-09-20 DIAGNOSIS — M35.01 SJOGREN'S SYNDROME WITH KERATOCONJUNCTIVITIS SICCA: ICD-10-CM

## 2017-09-20 PROCEDURE — 99396 PREV VISIT EST AGE 40-64: CPT | Mod: 25,S$GLB,, | Performed by: FAMILY MEDICINE

## 2017-09-20 PROCEDURE — 90471 IMMUNIZATION ADMIN: CPT | Mod: S$GLB,,, | Performed by: FAMILY MEDICINE

## 2017-09-20 PROCEDURE — 90686 IIV4 VACC NO PRSV 0.5 ML IM: CPT | Mod: S$GLB,,, | Performed by: FAMILY MEDICINE

## 2017-09-20 PROCEDURE — 99999 PR PBB SHADOW E&M-EST. PATIENT-LVL III: CPT | Mod: PBBFAC,,, | Performed by: FAMILY MEDICINE

## 2017-09-20 RX ORDER — TRAMADOL HYDROCHLORIDE 50 MG/1
50 TABLET ORAL EVERY 6 HOURS PRN
COMMUNITY
End: 2019-03-09 | Stop reason: SDUPTHER

## 2017-09-20 RX ORDER — NABUMETONE 750 MG/1
750 TABLET, FILM COATED ORAL 2 TIMES DAILY PRN
Qty: 30 TABLET | Refills: 0 | Status: SHIPPED | OUTPATIENT
Start: 2017-09-20 | End: 2019-01-14
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
## 2017-09-23 ENCOUNTER — LAB VISIT (OUTPATIENT)
Dept: LAB | Facility: HOSPITAL | Age: 42
End: 2017-09-23
Attending: FAMILY MEDICINE
Payer: COMMERCIAL

## 2017-09-23 DIAGNOSIS — Z00.00 ROUTINE GENERAL MEDICAL EXAMINATION AT A HEALTH CARE FACILITY: ICD-10-CM

## 2017-09-23 DIAGNOSIS — I10 ESSENTIAL HYPERTENSION: ICD-10-CM

## 2017-09-23 DIAGNOSIS — E78.2 MIXED HYPERLIPIDEMIA: ICD-10-CM

## 2017-09-23 DIAGNOSIS — M35.9 UNDIFFERENTIATED CONNECTIVE TISSUE DISEASE: ICD-10-CM

## 2017-09-23 LAB
25(OH)D3+25(OH)D2 SERPL-MCNC: 21 NG/ML
CHOLEST SERPL-MCNC: 155 MG/DL
CHOLEST/HDLC SERPL: 3.5 {RATIO}
CRP SERPL-MCNC: 4.4 MG/L
HDLC SERPL-MCNC: 44 MG/DL
HDLC SERPL: 28.4 %
LDLC SERPL CALC-MCNC: 85.6 MG/DL
NONHDLC SERPL-MCNC: 111 MG/DL
TRIGL SERPL-MCNC: 127 MG/DL
TSH SERPL DL<=0.005 MIU/L-ACNC: 1.31 UIU/ML
URATE SERPL-MCNC: 6.9 MG/DL

## 2017-09-23 PROCEDURE — 86140 C-REACTIVE PROTEIN: CPT

## 2017-09-23 PROCEDURE — 84550 ASSAY OF BLOOD/URIC ACID: CPT

## 2017-09-23 PROCEDURE — 80061 LIPID PANEL: CPT

## 2017-09-23 PROCEDURE — 36415 COLL VENOUS BLD VENIPUNCTURE: CPT | Mod: PO

## 2017-09-23 PROCEDURE — 84443 ASSAY THYROID STIM HORMONE: CPT

## 2017-09-23 PROCEDURE — 82306 VITAMIN D 25 HYDROXY: CPT

## 2017-09-24 ENCOUNTER — TELEPHONE (OUTPATIENT)
Dept: INTERNAL MEDICINE | Facility: CLINIC | Age: 42
End: 2017-09-24

## 2017-09-24 DIAGNOSIS — E55.9 VITAMIN D DEFICIENCY: Primary | ICD-10-CM

## 2017-09-24 RX ORDER — ERGOCALCIFEROL 1.25 MG/1
50000 CAPSULE ORAL
Qty: 10 CAPSULE | Refills: 0 | Status: SHIPPED | OUTPATIENT
Start: 2017-09-24 | End: 2017-12-21 | Stop reason: SDUPTHER

## 2017-09-25 NOTE — TELEPHONE ENCOUNTER
Low vitamin D levels, prescription will be sent to pharmacy , to be taken weekly , otherwise stable labs

## 2017-11-30 RX ORDER — LISINOPRIL AND HYDROCHLOROTHIAZIDE 12.5; 2 MG/1; MG/1
1 TABLET ORAL DAILY
Qty: 30 TABLET | Refills: 2 | Status: SHIPPED | OUTPATIENT
Start: 2017-11-30 | End: 2017-12-21 | Stop reason: SDUPTHER

## 2017-12-20 ENCOUNTER — OFFICE VISIT (OUTPATIENT)
Dept: INTERNAL MEDICINE | Facility: CLINIC | Age: 42
End: 2017-12-20
Payer: COMMERCIAL

## 2017-12-20 ENCOUNTER — CLINICAL SUPPORT (OUTPATIENT)
Dept: CARDIOLOGY | Facility: CLINIC | Age: 42
End: 2017-12-20
Payer: COMMERCIAL

## 2017-12-20 ENCOUNTER — HOSPITAL ENCOUNTER (OUTPATIENT)
Dept: RADIOLOGY | Facility: HOSPITAL | Age: 42
Discharge: HOME | End: 2017-12-20
Attending: FAMILY MEDICINE
Payer: COMMERCIAL

## 2017-12-20 VITALS
HEIGHT: 71 IN | DIASTOLIC BLOOD PRESSURE: 80 MMHG | HEART RATE: 83 BPM | WEIGHT: 239.19 LBS | TEMPERATURE: 97 F | SYSTOLIC BLOOD PRESSURE: 120 MMHG | OXYGEN SATURATION: 100 % | BODY MASS INDEX: 33.48 KG/M2

## 2017-12-20 DIAGNOSIS — E66.9 OBESITY (BMI 30.0-34.9): ICD-10-CM

## 2017-12-20 DIAGNOSIS — I10 ESSENTIAL HYPERTENSION: ICD-10-CM

## 2017-12-20 DIAGNOSIS — R53.82 CHRONIC FATIGUE: ICD-10-CM

## 2017-12-20 DIAGNOSIS — M35.9 UNDIFFERENTIATED CONNECTIVE TISSUE DISEASE: ICD-10-CM

## 2017-12-20 DIAGNOSIS — R53.82 CHRONIC FATIGUE: Primary | ICD-10-CM

## 2017-12-20 DIAGNOSIS — E55.9 VITAMIN D DEFICIENCY: ICD-10-CM

## 2017-12-20 DIAGNOSIS — R53.1 WEAKNESS: ICD-10-CM

## 2017-12-20 DIAGNOSIS — J30.1 CHRONIC SEASONAL ALLERGIC RHINITIS DUE TO POLLEN: ICD-10-CM

## 2017-12-20 PROCEDURE — 71020 XR CHEST PA AND LATERAL: CPT | Mod: 26,,, | Performed by: RADIOLOGY

## 2017-12-20 PROCEDURE — 99999 PR PBB SHADOW E&M-EST. PATIENT-LVL III: CPT | Mod: PBBFAC,,, | Performed by: FAMILY MEDICINE

## 2017-12-20 PROCEDURE — 71020 XR CHEST PA AND LATERAL: CPT | Mod: TC,PO

## 2017-12-20 PROCEDURE — 93000 ELECTROCARDIOGRAM COMPLETE: CPT | Mod: S$GLB,,, | Performed by: INTERNAL MEDICINE

## 2017-12-20 PROCEDURE — 99214 OFFICE O/P EST MOD 30 MIN: CPT | Mod: S$GLB,,, | Performed by: FAMILY MEDICINE

## 2017-12-20 RX ORDER — FLUTICASONE PROPIONATE 50 MCG
2 SPRAY, SUSPENSION (ML) NASAL DAILY
Qty: 16 G | Refills: 6 | Status: SHIPPED | OUTPATIENT
Start: 2017-12-20 | End: 2019-03-09 | Stop reason: SDUPTHER

## 2017-12-20 NOTE — PROGRESS NOTES
"Subjective:       Patient ID: Yoandy Forde is a 42 y.o. male.    Chief Complaint: Nasal Congestion    42-year-old -American male patient with Patient Active Problem List:     Dermatophytosis of other specified sites     Undifferentiated connective tissue disease     Essential hypertension     Hemorrhoids     GERD (gastroesophageal reflux disease)     Sjogren's syndrome     Medication monitoring encounter  Here with complaint of fatigue and not feeling well,  tired easily and has been feeling off balance lately  Patient has been taking his medications regularly  Reports that he has been trying to drink adequate fluids but has not been feeling better ,has been having runny nose and nasal congestion , has not been running fever  Patient does report occasional shortness of breath secondary to not feeling well but denies of any wheezing         Review of Systems   Constitutional: Positive for fatigue. Negative for appetite change and fever.   HENT: Positive for congestion and rhinorrhea.    Eyes: Negative for visual disturbance.   Respiratory: Positive for shortness of breath. Negative for cough and wheezing.    Cardiovascular: Negative for chest pain, palpitations and leg swelling.   Gastrointestinal: Negative for abdominal pain, nausea and vomiting.   Musculoskeletal: Positive for myalgias.   Skin: Negative for rash.   Neurological: Positive for dizziness. Negative for syncope, light-headedness and headaches.   Psychiatric/Behavioral: Negative for sleep disturbance.         /80   Pulse 83   Temp 97 °F (36.1 °C) (Tympanic)   Ht 5' 11" (1.803 m)   Wt 108.5 kg (239 lb 3.2 oz)   SpO2 100%   BMI 33.36 kg/m²   Objective:      Physical Exam   Constitutional: He is oriented to person, place, and time. He appears well-developed and well-nourished.   HENT:   Head: Normocephalic and atraumatic.   Right Ear: External ear normal.   Left Ear: External ear normal.   Mouth/Throat: Oropharynx is clear and moist. "   Postnasal drip and rhinorrhea noted   Cardiovascular: Normal rate, regular rhythm and normal heart sounds.    No murmur heard.  Pulmonary/Chest: Effort normal and breath sounds normal. He has no wheezes.   Abdominal: Soft. Bowel sounds are normal. There is no tenderness.   Musculoskeletal: He exhibits no edema.   Neurological: He is alert and oriented to person, place, and time.   Skin: Skin is warm and dry. No rash noted.   Psychiatric: He has a normal mood and affect.         Assessment:       1. Chronic fatigue    2. Weakness    3. Essential hypertension    4. Undifferentiated connective tissue disease    5. Vitamin D deficiency    6. Chronic seasonal allergic rhinitis due to pollen    7. Obesity (BMI 30.0-34.9)        Plan:   Chronic fatigue  -     CBC auto differential; Future; Expected date: 12/20/2017  -     EKG 12-lead; Future  -     X-Ray Chest PA And Lateral; Future; Expected date: 12/20/2017  Weakness  -     CBC auto differential; Future; Expected date: 12/20/2017   will check further labs to rule out dehydration  Secondary to weakness and shortness of breath will get further evaluation.  Encouraged to drink adequate fluids and eat protein rich diet to combat fatigue      Essential hypertension  -     Basic metabolic panel; Future; Expected date: 12/20/2017   blood pressure stable today currently on lisinopril hydrochlorothiazide 20/12.5 mg daily    Undifferentiated connective tissue disease- to be followed by rheumatology, if symptoms continue to persist advised to discuss further with hematology       Vitamin D deficiency  -     Vitamin D; Future; Expected date: 12/20/2017   Will recheck vitamin D levels as it was low in the past    Chronic seasonal allergic rhinitis due to pollen  -     fluticasone (FLONASE) 50 mcg/actuation nasal spray; 2 sprays by Each Nare route once daily.  Dispense: 16 g; Refill: 6   Flonase prescribed today  Advised to add over-the-counter Zyrtec and drink adequate fluids and  avoid decongestants for nasal cogesition    Obesity (BMI 30.0-34.9)-lifestyle modifications recommended with diet and exercise

## 2017-12-21 ENCOUNTER — LAB VISIT (OUTPATIENT)
Dept: LAB | Facility: HOSPITAL | Age: 42
End: 2017-12-21
Attending: PHYSICIAN ASSISTANT
Payer: COMMERCIAL

## 2017-12-21 ENCOUNTER — TELEPHONE (OUTPATIENT)
Dept: URGENT CARE | Facility: CLINIC | Age: 42
End: 2017-12-21

## 2017-12-21 ENCOUNTER — TELEPHONE (OUTPATIENT)
Dept: INTERNAL MEDICINE | Facility: CLINIC | Age: 42
End: 2017-12-21

## 2017-12-21 ENCOUNTER — OFFICE VISIT (OUTPATIENT)
Dept: URGENT CARE | Facility: CLINIC | Age: 42
End: 2017-12-21
Payer: COMMERCIAL

## 2017-12-21 VITALS
OXYGEN SATURATION: 99 % | TEMPERATURE: 98 F | WEIGHT: 237.44 LBS | BODY MASS INDEX: 33.24 KG/M2 | DIASTOLIC BLOOD PRESSURE: 74 MMHG | SYSTOLIC BLOOD PRESSURE: 122 MMHG | HEIGHT: 71 IN | HEART RATE: 89 BPM

## 2017-12-21 DIAGNOSIS — M35.01 SJOGREN'S SYNDROME WITH KERATOCONJUNCTIVITIS SICCA: ICD-10-CM

## 2017-12-21 DIAGNOSIS — R79.89 ELEVATED SERUM CREATININE: ICD-10-CM

## 2017-12-21 DIAGNOSIS — M35.9 UNDIFFERENTIATED CONNECTIVE TISSUE DISEASE: ICD-10-CM

## 2017-12-21 DIAGNOSIS — E55.9 VITAMIN D DEFICIENCY: ICD-10-CM

## 2017-12-21 DIAGNOSIS — E86.0 DEHYDRATION: Primary | ICD-10-CM

## 2017-12-21 LAB
ANION GAP SERPL CALC-SCNC: 9 MMOL/L
BUN SERPL-MCNC: 22 MG/DL
CALCIUM SERPL-MCNC: 9.3 MG/DL
CHLORIDE SERPL-SCNC: 106 MMOL/L
CO2 SERPL-SCNC: 26 MMOL/L
CREAT SERPL-MCNC: 1.2 MG/DL
EST. GFR  (AFRICAN AMERICAN): >60 ML/MIN/1.73 M^2
EST. GFR  (NON AFRICAN AMERICAN): >60 ML/MIN/1.73 M^2
GLUCOSE SERPL-MCNC: 98 MG/DL
POTASSIUM SERPL-SCNC: 3.9 MMOL/L
SODIUM SERPL-SCNC: 141 MMOL/L

## 2017-12-21 PROCEDURE — 99214 OFFICE O/P EST MOD 30 MIN: CPT | Mod: S$GLB,,, | Performed by: PHYSICIAN ASSISTANT

## 2017-12-21 PROCEDURE — 80048 BASIC METABOLIC PNL TOTAL CA: CPT | Mod: PO

## 2017-12-21 PROCEDURE — 36415 COLL VENOUS BLD VENIPUNCTURE: CPT | Mod: PO

## 2017-12-21 PROCEDURE — 99999 PR PBB SHADOW E&M-EST. PATIENT-LVL III: CPT | Mod: PBBFAC,,, | Performed by: PHYSICIAN ASSISTANT

## 2017-12-21 RX ORDER — LISINOPRIL AND HYDROCHLOROTHIAZIDE 12.5; 2 MG/1; MG/1
1 TABLET ORAL DAILY
Qty: 90 TABLET | Refills: 1 | Status: SHIPPED | OUTPATIENT
Start: 2017-12-21 | End: 2018-11-01 | Stop reason: SDUPTHER

## 2017-12-21 RX ORDER — ERGOCALCIFEROL 1.25 MG/1
50000 CAPSULE ORAL
Qty: 10 CAPSULE | Refills: 0 | Status: SHIPPED | OUTPATIENT
Start: 2017-12-21 | End: 2018-11-19

## 2017-12-21 RX ORDER — CYCLOSPORINE 0.5 MG/ML
1 EMULSION OPHTHALMIC 2 TIMES DAILY
Qty: 180 VIAL | Refills: 4 | OUTPATIENT
Start: 2017-12-21 | End: 2018-12-21

## 2017-12-21 RX ORDER — SODIUM CHLORIDE 9 MG/ML
INJECTION, SOLUTION INTRAVENOUS
Status: DISCONTINUED | OUTPATIENT
Start: 2017-12-21 | End: 2022-10-06

## 2017-12-21 RX ORDER — HYDROXYCHLOROQUINE SULFATE 200 MG/1
400 TABLET, FILM COATED ORAL DAILY
Qty: 180 TABLET | Refills: 1 | OUTPATIENT
Start: 2017-12-21

## 2017-12-21 NOTE — PROGRESS NOTES
"Subjective:       Patient ID: Yoandy Forde is a 42 y.o. male.    Chief Complaint: No chief complaint on file.    Fatigue   This is a new problem. The current episode started 1 to 4 weeks ago. The problem occurs constantly. The problem has been unchanged (seen by PCP yesterday and had lab work done, states he was contacted by his PCP's office that he was dehydrated and needed to come in for IV fluids). Associated symptoms include congestion and fatigue. Pertinent negatives include no abdominal pain, anorexia, chest pain, chills, coughing, fever, headaches, myalgias, nausea, rash, sore throat or vomiting. Associated symptoms comments: No body aches. Nothing aggravates the symptoms. Treatments tried: he has been trying to drink plenty of fluids. Had 2 bottles of water and a bottle of power aid. The treatment provided no relief.     Review of Systems   Constitutional: Positive for fatigue. Negative for chills and fever.   HENT: Positive for congestion. Negative for ear discharge, ear pain, postnasal drip, rhinorrhea, sinus pressure, sneezing and sore throat.    Eyes: Negative for pain and discharge.   Respiratory: Negative for cough, shortness of breath and wheezing.    Cardiovascular: Negative for chest pain and leg swelling.   Gastrointestinal: Negative for abdominal pain, anorexia, diarrhea, nausea and vomiting.   Genitourinary: Negative for decreased urine volume (states normal urination and normal color not concentrated).   Musculoskeletal: Negative for myalgias.   Skin: Negative for rash.   Neurological: Negative for headaches.       Objective:      /74 (BP Location: Right arm, Patient Position: Sitting, BP Method: Large (Manual))   Pulse 89   Temp 97.8 °F (36.6 °C) (Tympanic)   Ht 5' 11" (1.803 m)   Wt 107.7 kg (237 lb 7 oz)   SpO2 99%   BMI 33.12 kg/m²   Physical Exam   Constitutional: He is oriented to person, place, and time. He appears well-developed and well-nourished. No distress.   HENT:   Head: " Normocephalic and atraumatic.   Right Ear: Tympanic membrane, external ear and ear canal normal.   Left Ear: Tympanic membrane, external ear and ear canal normal.   Nose: Nose normal. Right sinus exhibits no maxillary sinus tenderness and no frontal sinus tenderness. Left sinus exhibits no maxillary sinus tenderness and no frontal sinus tenderness.   Mouth/Throat: Oropharynx is clear and moist. No oropharyngeal exudate or posterior oropharyngeal erythema. No tonsillar exudate.   Eyes: Conjunctivae and EOM are normal. Pupils are equal, round, and reactive to light.   Neck: Normal range of motion. Neck supple.   Cardiovascular: Normal rate, regular rhythm, normal heart sounds and intact distal pulses.  Exam reveals no gallop and no friction rub.    No murmur heard.  Pulmonary/Chest: Effort normal and breath sounds normal. No stridor. No respiratory distress. He has no wheezes. He has no rales. He exhibits no tenderness.   Lymphadenopathy:     He has no cervical adenopathy.   Neurological: He is alert and oriented to person, place, and time.   Skin: Skin is warm and dry. No rash noted. He is not diaphoretic.   Nursing note and vitals reviewed.      Assessment:       1. Dehydration    2. Elevated serum creatinine        Plan:       Dehydration  -     0.9%  NaCl infusion; Inject into the vein one time.    Elevated serum creatinine  -     Basic metabolic panel; Future; Expected date: 12/21/2017    Acute renal insufficiency likely combination of mild dehydration in the setting of diuretic use and ACE inhibitor. Cr was 1.5 yesterday but BUN only mildly elevated at 20. Attempted to give 1 L NS but unable to obtain access for IV. Will send patient for repeat BUN/Cr today given his efforts to orally hydrate.  BP well controlled if Cr remains elevated will send to ER for IV fluids. If starting to improve will briefly hold his diuretic and ACE and have him follow up with PCP early next week to further evaluate renal  function.      Casie Callahan PA-C  [facility]

## 2017-12-21 NOTE — TELEPHONE ENCOUNTER
----- Message from Tanja Monsalve sent at 12/21/2017  2:29 PM CST -----  Pt at [phone]//states he is returning your call regarding  his lab results//and was told he is needing IV fluid//please call again//sergo/evaristo

## 2017-12-22 NOTE — TELEPHONE ENCOUNTER
Informed patient his kidney function was improved from yesterday likely from his oral hydration. He was instructed to hold ACE/diuretic for only one day in AM and then resume the following day. Continue to push fluids and follow up next week with PCP if fatigue persists. He expressed understanding and has no further questions at this time.

## 2017-12-22 NOTE — PATIENT INSTRUCTIONS
Hold lisinopril/HCTZ for 1 day.  Continue to drink increased amounts of fluids.  We will contact you about your kidney function test.

## 2018-01-03 RX ORDER — FENOFIBRATE 145 MG/1
TABLET, FILM COATED ORAL
Qty: 30 TABLET | Refills: 11 | Status: SHIPPED | OUTPATIENT
Start: 2018-01-03 | End: 2018-11-19

## 2018-01-23 ENCOUNTER — OFFICE VISIT (OUTPATIENT)
Dept: INTERNAL MEDICINE | Facility: CLINIC | Age: 43
End: 2018-01-23
Payer: COMMERCIAL

## 2018-01-23 VITALS
HEIGHT: 71 IN | SYSTOLIC BLOOD PRESSURE: 142 MMHG | OXYGEN SATURATION: 99 % | HEART RATE: 86 BPM | BODY MASS INDEX: 33.67 KG/M2 | DIASTOLIC BLOOD PRESSURE: 94 MMHG | TEMPERATURE: 98 F | WEIGHT: 240.5 LBS

## 2018-01-23 DIAGNOSIS — I10 ESSENTIAL HYPERTENSION: ICD-10-CM

## 2018-01-23 DIAGNOSIS — Z72.0 TOBACCO ABUSE: ICD-10-CM

## 2018-01-23 DIAGNOSIS — E55.9 VITAMIN D DEFICIENCY: ICD-10-CM

## 2018-01-23 DIAGNOSIS — E66.9 OBESITY (BMI 30.0-34.9): ICD-10-CM

## 2018-01-23 DIAGNOSIS — R68.89 FLU-LIKE SYMPTOMS: Primary | ICD-10-CM

## 2018-01-23 DIAGNOSIS — R05.9 COUGH: ICD-10-CM

## 2018-01-23 PROBLEM — E66.811 OBESITY (BMI 30.0-34.9): Status: ACTIVE | Noted: 2018-01-23

## 2018-01-23 LAB
FLUAV AG SPEC QL IA: NEGATIVE
FLUBV AG SPEC QL IA: NEGATIVE
SPECIMEN SOURCE: NORMAL

## 2018-01-23 PROCEDURE — 99214 OFFICE O/P EST MOD 30 MIN: CPT | Mod: S$GLB,,, | Performed by: FAMILY MEDICINE

## 2018-01-23 PROCEDURE — 99999 PR PBB SHADOW E&M-EST. PATIENT-LVL III: CPT | Mod: PBBFAC,,, | Performed by: FAMILY MEDICINE

## 2018-01-23 PROCEDURE — 87400 INFLUENZA A/B EACH AG IA: CPT | Mod: 59,PO

## 2018-01-23 RX ORDER — BENZONATATE 200 MG/1
200 CAPSULE ORAL 3 TIMES DAILY PRN
Qty: 30 CAPSULE | Refills: 0 | Status: SHIPPED | OUTPATIENT
Start: 2018-01-23 | End: 2018-02-02

## 2018-01-23 RX ORDER — BENZONATATE 200 MG/1
200 CAPSULE ORAL 3 TIMES DAILY PRN
Qty: 30 CAPSULE | Refills: 0 | Status: SHIPPED | OUTPATIENT
Start: 2018-01-23 | End: 2018-01-23 | Stop reason: SDUPTHER

## 2018-01-23 NOTE — PROGRESS NOTES
"Subjective:       Patient ID: Yoandy Forde is a 42 y.o. male.    Chief Complaint: Back Pain; Insomnia; Cough (yellow mucus); and Generalized Body Aches (chills; fever off and on)    42-year-old -American male patient with Patient Active Problem List:     Dermatophytosis of other specified sites     Undifferentiated connective tissue disease     Essential hypertension     Hemorrhoids     GERD (gastroesophageal reflux disease)     Sjogren's syndrome     Medication monitoring encounter     Tobacco abuse     Vitamin D deficiency  Reported that he started to have low-grade fever with chills , coughing spells since this weekend and having body aches, and his lower back.  Did not take his blood pressure medication this morning.   Patient has been having scratchy throat, denies of any wheezing chest pain or difficulty breathing.   Continues to smoke a few cigarettes daily and would like to quit on his own.   Has received flu shot for this season  Not around sick contacts       Review of Systems   Constitutional: Positive for chills, fatigue and fever. Negative for appetite change.   HENT: Positive for congestion and sore throat.    Eyes: Negative for visual disturbance.   Respiratory: Positive for cough. Negative for shortness of breath and wheezing.    Cardiovascular: Negative for chest pain, palpitations and leg swelling.   Gastrointestinal: Negative for abdominal pain, nausea and vomiting.   Musculoskeletal: Positive for back pain and myalgias.   Skin: Negative for rash.   Neurological: Negative for headaches.   Psychiatric/Behavioral: Negative for sleep disturbance.         BP (!) 142/94 (BP Location: Right arm, Patient Position: Sitting)   Pulse 86   Temp 98 °F (36.7 °C) (Tympanic)   Ht 5' 11" (1.803 m)   Wt 109.1 kg (240 lb 8.4 oz)   SpO2 99%   BMI 33.55 kg/m²   Objective:      Physical Exam   Constitutional: He is oriented to person, place, and time. He appears well-developed and well-nourished.   HENT: "   Head: Normocephalic and atraumatic.   Right Ear: External ear normal.   Left Ear: External ear normal.   Mouth/Throat: Oropharynx is clear and moist. No oropharyngeal exudate.   Postnasal drip noted on exam   Neck: Neck supple.   Cardiovascular: Normal rate, regular rhythm and normal heart sounds.    No murmur heard.  Pulmonary/Chest: Effort normal and breath sounds normal. No respiratory distress. He has no wheezes.   Abdominal: Soft. Bowel sounds are normal. There is no tenderness.   Musculoskeletal: He exhibits no edema.   Lymphadenopathy:     He has no cervical adenopathy.   Neurological: He is alert and oriented to person, place, and time.   Skin: Skin is warm and dry. No rash noted.   Psychiatric: He has a normal mood and affect.         Assessment:       1. Flu-like symptoms    2. Cough    3. Essential hypertension    4. Tobacco abuse    5. Vitamin D deficiency    6. Obesity (BMI 30.0-34.9)        Plan:   Flu-like symptoms  -     Influenza antigen Nasal Swab  Will check for flulike symptoms for flu and treat accordingly  Patient to take over-the-counter Tylenol/ibuprofen as needed for fever/chills, drink adequate fluids    Cough  -     Discontinue: benzonatate (TESSALON) 200 MG capsule; Take 1 capsule (200 mg total) by mouth 3 (three) times daily as needed.  Dispense: 30 capsule; Refill: 0  -     benzonatate (TESSALON) 200 MG capsule; Take 1 capsule (200 mg total) by mouth 3 (three) times daily as needed.  Dispense: 30 capsule; Refill: 0  Tessalon Perles prescribed today for symptomatic relief     Essential hypertension  -     NURSING COMMUNICATION: Create [facility] Account  -     Hypertension Digital Medicine (HDMP) Enrollment Order  -     Hypertension Digital Medicine (HDMP): Assign Onboarding Questionnaires   blood pressure elevated today for not taking his medications, advised to go home and take lisinopril hydrochlorothiazide 20/1.5 mg daily  Will enroll in hypertension digital program  Eat low-fat and  low-cholesterol diet to lose weight with BMI 33    Tobacco abuse-encouraged to quit smoking ,  would like to quit on his own     Vitamin D deficiency-taking vitamin D by prescription

## 2018-01-24 NOTE — PROGRESS NOTES
"Subjective:       Patient ID: Yoandy Forde is a 42 y.o. male.    Chief Complaint: Sjogrens/uctd    Yoandy is here for f/u with a diagnosis of UCTD that is looking more like primary Sjogrens with +SSA, dry eyes, and arthralgia. + previous positive DONG with +SSA done in 2012 but last few checks were normal.  He takes plaquenil 400mg/day with no issues.  Uses restasis for his dry eyes, he c/o mild dry mouth.  Denies Raynauds. Avoids nsaids due to blood pressure. Last visit he tried pennsaid for his joint pain, hands and elbow and this worked well.      Today he isn't feeling well. Was tested for flu 2 days ago and was negative. He is running fever, feels achy, has cough.  He c/o pain in his lower back and side on the right.   Denies chest pain, sob, no weakness. No rashes.  Has mild gerd but takes tums occasionally and this helps.       Review of Systems   Constitutional: Positive for fever. Negative for chills, fatigue and unexpected weight change.   HENT: Negative for congestion, mouth sores and rhinorrhea.         Dry  mouth   Eyes: Negative for pain, discharge and redness.        Dry eyes   Respiratory: Positive for cough. Negative for shortness of breath.    Cardiovascular: Negative for chest pain and leg swelling.   Gastrointestinal: Negative for abdominal pain, diarrhea, nausea and vomiting.        Mild gerd   Endocrine: Negative for cold intolerance and heat intolerance.   Genitourinary: Negative for dysuria.   Musculoskeletal: Positive for arthralgias and myalgias. Negative for joint swelling.   Skin: Negative for rash.   Allergic/Immunologic: Negative for immunocompromised state.   Neurological: Negative for weakness and headaches.   Psychiatric/Behavioral: The patient is not nervous/anxious.          Objective:   BP (!) 144/84   Pulse 89   Resp 20   Ht 5' 11" (1.803 m)   Wt 107.1 kg (236 lb 1.8 oz)   BMI 32.93 kg/m²      Physical Exam   Constitutional: He is oriented to person, place, and time and " well-developed, well-nourished, and in no distress.   HENT:   Head: Normocephalic and atraumatic.   Eyes: Pupils are equal, round, and reactive to light. Right eye exhibits no discharge.   Neck: Normal range of motion.   Cardiovascular: Normal rate, regular rhythm and normal heart sounds.  Exam reveals no friction rub.    Pulmonary/Chest: Effort normal and breath sounds normal. No respiratory distress.   Abdominal: Soft. He exhibits no distension. There is no tenderness.   Lymphadenopathy:     He has no cervical adenopathy.   Neurological: He is alert and oriented to person, place, and time.   Skin: No rash noted. No erythema.     No raynauds, + telangiectasia to his bilateral palms   Psychiatric: Mood normal.   Musculoskeletal: Normal range of motion. He exhibits no edema or deformity.   Hands are puffy bilaterally, no synovitis noted to mcps, pips, dips  tati knees with no effusion, no warmth or tenderness    Right lumbar paraspinal muscle mild tenderness         Recent Results (from the past 168 hour(s))   Influenza antigen Nasal Swab    Collection Time: 01/23/18 11:57 AM   Result Value Ref Range    Influenza A Ag, EIA Negative Negative    Influenza B Ag, EIA Negative Negative    Flu A & B Source Nasal Swab         Ref. Range 8/9/2012 14:03   DONG Latest Ref Range: Neg <1:160  Pos, Titer to follow (A)   DONG HEP-2 Titer Latest Ref Range: Neg <1:160 titer Pos 1:160 (A)   DONG Hep-2 Pattern Unknown Nucleolar (A)   Anti-SSA Antibody Latest Ref Range: <20 EU 49.55   Anti-SSA Interpretation Latest Ref Range: Negative  Positive (A)   Anti-SSB Antibody Latest Ref Range: <20 EU 2.39   Anti-SSB Interpretation Latest Ref Range: Negative  Negative   ds DNA Ab Latest Ref Range: Negative Titer Negative   Anti Sm Antibody Latest Ref Range: <20 EU 2.64   Anti-Sm Interpretation Latest Ref Range: Negative  Negative   Anti Sm/RNP Antibody Latest Ref Range: <20 EU 0.98   Anti-Sm/RNP Interpretation Latest Ref Range: Negative  Negative      Assessment:       1. Undifferentiated connective tissue disease    2. Sjogren's syndrome with keratoconjunctivitis sicca    3. Medication monitoring encounter    4. Osteoarthritis involving multiple joints on both sides of body    5. Cough        Impression:  UCTD history likely primary Sjogrens with prev(2012) +marcela 1:160 nucleolar and +SSA, last MARCELA negative (4/2017): dry eyes, mild dry mouth; arthralgias, plaquenil 400mg/d; stable, no signs of progression, monitoring for Scl with previous nucleolar pattern    Arthralgia/OA hands: no synovitis on exam, avoids nsaids due to htn, pennsaid topical helped    Medication monitoring: no signs of toxicity with plaquenil, seeing eye md today     URI/viral infection: flu like symptoms x 6 days, flu swab neg    Muscle spasm lumbar right paraspinal, acute    Plan:       Cont plaquenil 400mg/day  pennsaid topical [facility]   Flexeril q hs for muscle spasm  Go home, hydrate, rest, if cough worsens or fever worsens see pcp  Cont yearly eye checks    rtc 6 mos with reg 4, urine,  Send to lab today reg4, urine, ssa/ssb  Return sooner if new or worsening symptoms

## 2018-01-25 ENCOUNTER — LAB VISIT (OUTPATIENT)
Dept: LAB | Facility: HOSPITAL | Age: 43
End: 2018-01-25
Attending: PHYSICIAN ASSISTANT
Payer: COMMERCIAL

## 2018-01-25 ENCOUNTER — OFFICE VISIT (OUTPATIENT)
Dept: RHEUMATOLOGY | Facility: CLINIC | Age: 43
End: 2018-01-25
Payer: COMMERCIAL

## 2018-01-25 VITALS
BODY MASS INDEX: 33.06 KG/M2 | HEART RATE: 89 BPM | SYSTOLIC BLOOD PRESSURE: 144 MMHG | HEIGHT: 71 IN | WEIGHT: 236.13 LBS | RESPIRATION RATE: 20 BRPM | DIASTOLIC BLOOD PRESSURE: 84 MMHG

## 2018-01-25 DIAGNOSIS — M35.01 SJOGREN'S SYNDROME WITH KERATOCONJUNCTIVITIS SICCA: ICD-10-CM

## 2018-01-25 DIAGNOSIS — M35.9 UNDIFFERENTIATED CONNECTIVE TISSUE DISEASE: Primary | ICD-10-CM

## 2018-01-25 DIAGNOSIS — M15.9 OSTEOARTHRITIS INVOLVING MULTIPLE JOINTS ON BOTH SIDES OF BODY: ICD-10-CM

## 2018-01-25 DIAGNOSIS — Z51.81 MEDICATION MONITORING ENCOUNTER: ICD-10-CM

## 2018-01-25 DIAGNOSIS — R05.9 COUGH: ICD-10-CM

## 2018-01-25 LAB
ALBUMIN SERPL BCP-MCNC: 4.4 G/DL
ALP SERPL-CCNC: 46 U/L
ALT SERPL W/O P-5'-P-CCNC: 18 U/L
ANION GAP SERPL CALC-SCNC: 10 MMOL/L
AST SERPL-CCNC: 27 U/L
BASOPHILS # BLD AUTO: 0.04 K/UL
BASOPHILS NFR BLD: 1.2 %
BILIRUB SERPL-MCNC: 0.4 MG/DL
BUN SERPL-MCNC: 12 MG/DL
CALCIUM SERPL-MCNC: 9.4 MG/DL
CHLORIDE SERPL-SCNC: 103 MMOL/L
CO2 SERPL-SCNC: 26 MMOL/L
CREAT SERPL-MCNC: 1.3 MG/DL
CRP SERPL-MCNC: 3 MG/L
DIFFERENTIAL METHOD: ABNORMAL
EOSINOPHIL # BLD AUTO: 0.1 K/UL
EOSINOPHIL NFR BLD: 2.7 %
ERYTHROCYTE [DISTWIDTH] IN BLOOD BY AUTOMATED COUNT: 13.4 %
ERYTHROCYTE [SEDIMENTATION RATE] IN BLOOD BY WESTERGREN METHOD: 4 MM/HR
EST. GFR  (AFRICAN AMERICAN): >60 ML/MIN/1.73 M^2
EST. GFR  (NON AFRICAN AMERICAN): >60 ML/MIN/1.73 M^2
GLUCOSE SERPL-MCNC: 97 MG/DL
HCT VFR BLD AUTO: 45.7 %
HGB BLD-MCNC: 16 G/DL
LYMPHOCYTES # BLD AUTO: 1.6 K/UL
LYMPHOCYTES NFR BLD: 49.4 %
MCH RBC QN AUTO: 31.3 PG
MCHC RBC AUTO-ENTMCNC: 35 G/DL
MCV RBC AUTO: 89 FL
MONOCYTES # BLD AUTO: 0.6 K/UL
MONOCYTES NFR BLD: 18 %
NEUTROPHILS # BLD AUTO: 0.9 K/UL
NEUTROPHILS NFR BLD: 28.7 %
PLATELET # BLD AUTO: 198 K/UL
PMV BLD AUTO: 10.3 FL
POTASSIUM SERPL-SCNC: 3.9 MMOL/L
PROT SERPL-MCNC: 7.9 G/DL
RBC # BLD AUTO: 5.11 M/UL
SODIUM SERPL-SCNC: 139 MMOL/L
WBC # BLD AUTO: 3.28 K/UL

## 2018-01-25 PROCEDURE — 99999 PR PBB SHADOW E&M-EST. PATIENT-LVL IV: CPT | Mod: PBBFAC,,, | Performed by: PHYSICIAN ASSISTANT

## 2018-01-25 PROCEDURE — 86235 NUCLEAR ANTIGEN ANTIBODY: CPT | Mod: 59

## 2018-01-25 PROCEDURE — 36415 COLL VENOUS BLD VENIPUNCTURE: CPT | Mod: PO

## 2018-01-25 PROCEDURE — 80053 COMPREHEN METABOLIC PANEL: CPT | Mod: PO

## 2018-01-25 PROCEDURE — 85025 COMPLETE CBC W/AUTO DIFF WBC: CPT | Mod: PO

## 2018-01-25 PROCEDURE — 86140 C-REACTIVE PROTEIN: CPT

## 2018-01-25 PROCEDURE — 85651 RBC SED RATE NONAUTOMATED: CPT | Mod: PO

## 2018-01-25 PROCEDURE — 99214 OFFICE O/P EST MOD 30 MIN: CPT | Mod: S$GLB,,, | Performed by: PHYSICIAN ASSISTANT

## 2018-01-25 PROCEDURE — 86235 NUCLEAR ANTIGEN ANTIBODY: CPT

## 2018-01-25 RX ORDER — HYDROXYCHLOROQUINE SULFATE 200 MG/1
200 TABLET, FILM COATED ORAL 2 TIMES DAILY
Qty: 60 TABLET | Refills: 4 | Status: SHIPPED | OUTPATIENT
Start: 2018-01-25 | End: 2019-01-14 | Stop reason: SDUPTHER

## 2018-01-25 RX ORDER — DICLOFENAC SODIUM 20 MG/G
2 SOLUTION TOPICAL 2 TIMES DAILY
Qty: 1 BOTTLE | Refills: 6 | Status: SHIPPED | OUTPATIENT
Start: 2018-01-25

## 2018-01-25 RX ORDER — CYCLOBENZAPRINE HCL 10 MG
10 TABLET ORAL 2 TIMES DAILY PRN
Qty: 60 TABLET | Refills: 3 | Status: SHIPPED | OUTPATIENT
Start: 2018-01-25 | End: 2018-02-24

## 2018-01-25 NOTE — PATIENT INSTRUCTIONS
5,000units vit D over the counter    Cont plaquenil two daily     Flexeril muscle relaxer    Tylenol q 4 hours, rest     pennsaid topical gel sent to [place] bc it's cheaper, they will call you

## 2018-01-27 LAB
ANTI-SSA ANTIBODY: 9.55 EU
ANTI-SSA INTERPRETATION: NEGATIVE
ANTI-SSB ANTIBODY: 0.22 EU
ANTI-SSB INTERPRETATION: NEGATIVE

## 2018-01-30 ENCOUNTER — TELEPHONE (OUTPATIENT)
Dept: INTERNAL MEDICINE | Facility: CLINIC | Age: 43
End: 2018-01-30

## 2018-01-30 DIAGNOSIS — R05.9 COUGH: Primary | ICD-10-CM

## 2018-01-30 RX ORDER — PROMETHAZINE HYDROCHLORIDE AND DEXTROMETHORPHAN HYDROBROMIDE 6.25; 15 MG/5ML; MG/5ML
5 SYRUP ORAL NIGHTLY PRN
Qty: 180 ML | Refills: 0 | Status: SHIPPED | OUTPATIENT
Start: 2018-01-30 | End: 2018-02-09

## 2018-01-30 NOTE — TELEPHONE ENCOUNTER
----- Message from Ingrid Griffin LPN sent at 1/30/2018 10:30 AM CST -----  Contact: Patient      ----- Message -----  From: Mónica Vivas  Sent: 1/30/2018  10:26 AM  To: Braden Hernandez Staff    Patient called and stated he was given something for a cough and it's not working. He can be contacted at [phone].    Thanks,  Mónica

## 2018-05-01 DIAGNOSIS — M35.01 SJOGREN'S SYNDROME WITH KERATOCONJUNCTIVITIS SICCA: ICD-10-CM

## 2018-05-01 DIAGNOSIS — M35.9 UNDIFFERENTIATED CONNECTIVE TISSUE DISEASE: ICD-10-CM

## 2018-05-01 RX ORDER — HYDROXYCHLOROQUINE SULFATE 200 MG/1
400 TABLET, FILM COATED ORAL DAILY
Qty: 180 TABLET | Refills: 1 | Status: SHIPPED | OUTPATIENT
Start: 2018-05-01 | End: 2018-11-19 | Stop reason: SDUPTHER

## 2018-06-27 ENCOUNTER — TELEPHONE (OUTPATIENT)
Dept: DERMATOLOGY | Facility: CLINIC | Age: 43
End: 2018-06-27

## 2018-06-27 NOTE — TELEPHONE ENCOUNTER
Please schedule patient with Dr. Price. The PA does not see rashes. She has a list of things that she see patients for.        acne   rosacea   hair loss   nail issues (including nail fungus)   seborrheic dermatitis   warts   molluscum   pediatric eczema   skin discoloration  keloids   skin tags

## 2018-06-28 ENCOUNTER — OFFICE VISIT (OUTPATIENT)
Dept: DERMATOLOGY | Facility: CLINIC | Age: 43
End: 2018-06-28
Payer: COMMERCIAL

## 2018-06-28 DIAGNOSIS — L25.9 CONTACT DERMATITIS AND ECZEMA: Primary | ICD-10-CM

## 2018-06-28 PROCEDURE — 93005 ELECTROCARDIOGRAM TRACING: CPT

## 2018-06-28 PROCEDURE — 99284 EMERGENCY DEPT VISIT MOD MDM: CPT | Mod: 25

## 2018-06-28 PROCEDURE — 99999 PR PBB SHADOW E&M-EST. PATIENT-LVL I: CPT | Mod: PBBFAC,,, | Performed by: PHYSICIAN ASSISTANT

## 2018-06-28 PROCEDURE — 99202 OFFICE O/P NEW SF 15 MIN: CPT | Mod: S$GLB,,, | Performed by: PHYSICIAN ASSISTANT

## 2018-06-28 RX ORDER — BETAMETHASONE DIPROPIONATE 0.5 MG/G
CREAM TOPICAL 2 TIMES DAILY
Qty: 15 G | Refills: 0 | Status: SHIPPED | OUTPATIENT
Start: 2018-06-28 | End: 2022-10-31

## 2018-06-28 RX ORDER — HYDROXYZINE HYDROCHLORIDE 10 MG/1
10 TABLET, FILM COATED ORAL NIGHTLY PRN
Qty: 30 TABLET | Refills: 0 | Status: SHIPPED | OUTPATIENT
Start: 2018-06-28 | End: 2018-07-28

## 2018-06-28 NOTE — PATIENT INSTRUCTIONS
Use a mild gentle soap such as Dove for Sensitive Skin or Cetaphil Gentle Cleanser.  Recommend fragrance free and hypoallergenic skin care products.  Shower or bathe once daily. Limit duration to 5 minutes with lukewarm water.  Apply moisturizer immediately after showering.  Some good moisturizers to try are Cetaphil, CeraVe, or Aveeno.     Apply betamethasone cream twice daily to affected rash for up 2 weeks as needed.  Hydroxyzine 10mg tab by mouth at bedtime as needed for itching. May cause drowsiness!!

## 2018-06-28 NOTE — TELEPHONE ENCOUNTER
----- Message from Sidra Santana sent at 6/28/2018 12:16 PM CDT -----  Contact: pt  The pt states he is returning a missed call, can be reached at [phone]///thxMW

## 2018-06-28 NOTE — PROGRESS NOTES
Subjective:       Patient ID:  Yoandy Forde is a 43 y.o. male who presents for No chief complaint on file.    History of Present Illness: The patient presents with chief complaint of rash. Hx of massage 3 weeks ago which is the only new exposure he reports. Denies hx of eczema.   Location: mid right back and left lateral back  Duration: 1.5 weeks  Signs/Symptoms: itches, raised    Prior treatments: OTC HTC    Current Skin Care Regimen  Soap: Dial   Moisturizer: none  Detergent:Tide   Fabric softener: bounce  Colognes/Perfumes/Fragrances: daily  Bathing: shower 5-10 min w/warm water          Review of Systems   Constitutional: Negative for fever and chills.   Gastrointestinal: Negative for nausea and vomiting.   Skin: Positive for itching and rash. Negative for daily sunscreen use and recent sunburn.   Hematologic/Lymphatic: Does not bruise/bleed easily.        Objective:    Physical Exam   Constitutional: He appears well-developed and well-nourished. No distress.   Neurological: He is alert and oriented to person, place, and time. He is not disoriented.   Psychiatric: He has a normal mood and affect.   Skin:   Areas Examined (abnormalities noted in diagram):   Head / Face Inspection Performed  Neck Inspection Performed  Chest / Axilla Inspection Performed  Back Inspection Performed  RUE Inspected  LUE Inspection Performed              Diagram Legend     Erythematous scaling macule/papule c/w actinic keratosis       Vascular papule c/w angioma      Pigmented verrucoid papule/plaque c/w seborrheic keratosis      Yellow umbilicated papule c/w sebaceous hyperplasia      Irregularly shaped tan macule c/w lentigo     1-2 mm smooth white papules consistent with Milia      Movable subcutaneous cyst with punctum c/w epidermal inclusion cyst      Subcutaneous movable cyst c/w pilar cyst      Firm pink to brown papule c/w dermatofibroma      Pedunculated fleshy papule(s) c/w skin tag(s)      Evenly pigmented macule c/w  junctional nevus     Mildly variegated pigmented, slightly irregular-bordered macule c/w mildly atypical nevus      Flesh colored to evenly pigmented papule c/w intradermal nevus       Pink pearly papule/plaque c/w basal cell carcinoma      Erythematous hyperkeratotic cursted plaque c/w SCC      Surgical scar with no sign of skin cancer recurrence      Open and closed comedones      Inflammatory papules and pustules      Verrucoid papule consistent consistent with wart     Erythematous eczematous patches and plaques     Dystrophic onycholytic nail with subungual debris c/w onychomycosis     Umbilicated papule    Erythematous-base heme-crusted tan verrucoid plaque consistent with inflamed seborrheic keratosis     Erythematous Silvery Scaling Plaque c/w Psoriasis     See annotation      Assessment / Plan:        Contact dermatitis and eczema  -     betamethasone dipropionate (DIPROLENE) 0.05 % cream; Apply topically 2 (two) times daily. for 10 days  Dispense: 15 g; Refill: 0  -     hydrOXYzine HCl (ATARAX) 10 MG Tab; Take 1 tablet (10 mg total) by mouth nightly as needed. For itching. May cause drowsiness.  Do not drive or operate heavy machinery.  Dispense: 30 tablet; Refill: 0    Start above meds.  Recommend gentle skin care.        Follow-up if symptoms worsen or fail to improve.

## 2018-06-29 ENCOUNTER — HOSPITAL ENCOUNTER (EMERGENCY)
Facility: HOSPITAL | Age: 43
Discharge: HOME | End: 2018-06-29
Attending: EMERGENCY MEDICINE
Payer: COMMERCIAL

## 2018-06-29 VITALS
RESPIRATION RATE: 20 BRPM | HEIGHT: 71 IN | BODY MASS INDEX: 32.2 KG/M2 | HEART RATE: 68 BPM | DIASTOLIC BLOOD PRESSURE: 89 MMHG | TEMPERATURE: 98 F | OXYGEN SATURATION: 99 % | WEIGHT: 230 LBS | SYSTOLIC BLOOD PRESSURE: 142 MMHG

## 2018-06-29 DIAGNOSIS — R07.9 CHEST PAIN, UNSPECIFIED TYPE: Primary | ICD-10-CM

## 2018-06-29 DIAGNOSIS — R00.2 PALPITATIONS: ICD-10-CM

## 2018-06-29 LAB
ALBUMIN SERPL BCP-MCNC: 4.1 G/DL
ALP SERPL-CCNC: 52 U/L
ALT SERPL W/O P-5'-P-CCNC: 19 U/L
ANION GAP SERPL CALC-SCNC: 0 MMOL/L
AST SERPL-CCNC: 23 U/L
BASOPHILS # BLD AUTO: 0.03 K/UL
BASOPHILS NFR BLD: 0.6 %
BILIRUB SERPL-MCNC: 0.3 MG/DL
BILIRUB UR QL STRIP: NEGATIVE
BNP SERPL-MCNC: <10 PG/ML
BUN SERPL-MCNC: 18 MG/DL
CALCIUM SERPL-MCNC: 9.3 MG/DL
CHLORIDE SERPL-SCNC: 106 MMOL/L
CK SERPL-CCNC: 472 U/L
CLARITY UR: CLEAR
CO2 SERPL-SCNC: 22 MMOL/L
COLOR UR: YELLOW
CREAT SERPL-MCNC: 1.2 MG/DL
DIFFERENTIAL METHOD: ABNORMAL
EOSINOPHIL # BLD AUTO: 0.3 K/UL
EOSINOPHIL NFR BLD: 5.3 %
ERYTHROCYTE [DISTWIDTH] IN BLOOD BY AUTOMATED COUNT: 13.7 %
EST. GFR  (AFRICAN AMERICAN): >60 ML/MIN/1.73 M^2
EST. GFR  (NON AFRICAN AMERICAN): >60 ML/MIN/1.73 M^2
GLUCOSE SERPL-MCNC: 98 MG/DL
GLUCOSE UR QL STRIP: NEGATIVE
HCT VFR BLD AUTO: 41.3 %
HGB BLD-MCNC: 14.5 G/DL
HGB UR QL STRIP: NEGATIVE
KETONES UR QL STRIP: NEGATIVE
LEUKOCYTE ESTERASE UR QL STRIP: NEGATIVE
LYMPHOCYTES # BLD AUTO: 2.9 K/UL
LYMPHOCYTES NFR BLD: 54.8 %
MCH RBC QN AUTO: 31.4 PG
MCHC RBC AUTO-ENTMCNC: 35.1 G/DL
MCV RBC AUTO: 89 FL
MONOCYTES # BLD AUTO: 0.3 K/UL
MONOCYTES NFR BLD: 5.9 %
NEUTROPHILS # BLD AUTO: 1.8 K/UL
NEUTROPHILS NFR BLD: 33.4 %
NITRITE UR QL STRIP: NEGATIVE
PH UR STRIP: 6 [PH] (ref 5–8)
PLATELET # BLD AUTO: 211 K/UL
PMV BLD AUTO: 10.3 FL
POTASSIUM SERPL-SCNC: 3.6 MMOL/L
PROT SERPL-MCNC: 7 G/DL
PROT UR QL STRIP: NEGATIVE
RBC # BLD AUTO: 4.62 M/UL
SODIUM SERPL-SCNC: 128 MMOL/L
SP GR UR STRIP: >=1.03 (ref 1–1.03)
TROPONIN I SERPL DL<=0.01 NG/ML-MCNC: <0.006 NG/ML
URN SPEC COLLECT METH UR: ABNORMAL
UROBILINOGEN UR STRIP-ACNC: NEGATIVE EU/DL
WBC # BLD AUTO: 5.29 K/UL

## 2018-06-29 PROCEDURE — 85025 COMPLETE CBC W/AUTO DIFF WBC: CPT

## 2018-06-29 PROCEDURE — 83880 ASSAY OF NATRIURETIC PEPTIDE: CPT

## 2018-06-29 PROCEDURE — 93010 ELECTROCARDIOGRAM REPORT: CPT | Mod: ,,, | Performed by: INTERNAL MEDICINE

## 2018-06-29 PROCEDURE — 82550 ASSAY OF CK (CPK): CPT

## 2018-06-29 PROCEDURE — 80053 COMPREHEN METABOLIC PANEL: CPT

## 2018-06-29 PROCEDURE — 84484 ASSAY OF TROPONIN QUANT: CPT

## 2018-06-29 PROCEDURE — 81003 URINALYSIS AUTO W/O SCOPE: CPT

## 2018-06-29 NOTE — ED PROVIDER NOTES
"SCRIBE #1 NOTE: I, Johana Lawson, am scribing for, and in the presence of, No att. providers found. I have scribed the entire note.      History      Chief Complaint   Patient presents with    Palpitations     c/o palpitations x 4 days. also with c/o shortness of breath. Symptoms worse when laying down       Review of patient's allergies indicates:   Allergen Reactions    Sulfa (sulfonamide antibiotics) Hives     Other reaction(s): Unknown    Sulfamethoxazole-trimethoprim Hives        HPI   HPI    6/29/2018, 12:21 AM   History obtained from the {adult relatives:[id number]::"patient"}      History of Present Illness: Yoandy Forde is a 43 y.o. male patient who presents to the Emergency Department for ***    which onset gradually***. Symptoms are constant*** and moderate*** in severity. *** No mitigating or exacerbating factors reported. Associated sxs include ***. Patient denies any ***, and all other sxs at this time. Prior Tx includes ***. No further complaints or concerns at this time.     Arrival mode: ***Personal vehicle  EMS  [facility]***    PCP: Mary Feliciano MD       Past Medical History:  Past Medical History:   Diagnosis Date    Hyperglycemia     Hypertension     MCTD (mixed connective tissue disease)     Dr Branham     Vitiligo        Past Surgical History:  Past Surgical History:   Procedure Laterality Date    NASAL SEPTUM SURGERY      neck lipoma excision           Family History:  Family History   Problem Relation Age of Onset    Diabetes Mother     Hypertension Mother     Stroke Father     Hypertension Father     Asthma Brother     Heart disease Sister     Aneurysm Sister        Social History:  Social History     Social History Main Topics    Smoking status: Current Some Day Smoker     Packs/day: 0.50     Years: 15.00     Types: Cigarettes     Start date: 4/14/1995     Last attempt to quit: 10/20/2015    Smokeless tobacco: Never Used    Alcohol use 0.0 oz/week      Comment: occasional use    " "Drug use: Unknown    Sexual activity: Not on file       ROS   Review of Systems  ***  Physical Exam      Initial Vitals [06/28/18 2333]   BP Pulse Resp Temp SpO2   (!) 168/83 82 18 98.4 °F (36.9 °C) 97 %      MAP       --          Physical Exam  Nursing Notes and Vital Signs Reviewed.  Constitutional: Patient is in {DISTRESS LEVEL:69505}. Well-developed and well-nourished.  Head: Atraumatic. Normocephalic.  Eyes: PERRL. EOM intact. Conjunctivae are not pale. No scleral icterus.  ENT: Mucous membranes are moist. Oropharynx is clear and symmetric***.    Neck: Supple. Full ROM. No lymphadenopathy.  Cardiovascular: Regular rate. Regular rhythm***. No murmurs, rubs, or gallops. Distal pulses are 2+ and symmetric***.  Pulmonary/Chest: No respiratory distress. Clear to auscultation bilaterally***. No wheezing or rales.  Abdominal: Soft and non-distended.  There is no tenderness.  No rebound, guarding, or rigidity. Good bowel sounds***.  Genitourinary: No*** CVA tenderness  Musculoskeletal: Moves all extremities. No obvious deformities. No edema. No calf tenderness***.  Skin: Warm and dry.  Neurological:  Alert, awake, and appropriate.  Normal speech.  No acute focal neurological deficits are appreciated.  Psychiatric: Normal affect. Good eye contact. Appropriate in content.    ED Course    Procedures  ED Vital Signs:  Vitals:    06/28/18 2333   BP: (!) 168/83   Pulse: 82   Resp: 18   Temp: 98.4 °F (36.9 °C)   TempSrc: Oral   SpO2: 97%   Weight: 104.3 kg (230 lb)   Height: 5' 11" (1.803 m)       Abnormal Lab Results:  Labs Reviewed - No data to display     All Lab Results:  ***    Imaging Results:  Imaging Results    None                 The Emergency Provider reviewed the vital signs and test results, which are outlined above.    ED Discussion     ***    ED Medication(s):  Medications - No data to display    New Prescriptions    No medications on file             Medical Decision Making              Scribe Attestation: "   Scribe #1: I performed the above scribed service and the documentation accurately describes the services I performed. I attest to the accuracy of the note.    Attending:   Physician Attestation Statement for Scribe #1: I, No att. providers found, personally performed the services described in this documentation, as scribed by Johana Lawson, in my presence, and it is both accurate and complete.          Clinical Impression       ICD-10-CM ICD-9-CM   1. Palpitations R00.2 785.1

## 2018-06-29 NOTE — ED PROVIDER NOTES
SCRIBE #1 NOTE: I, Cristopher Mckeon, am scribing for, and in the presence of, Parveen Hinojosa MD. I have scribed the entire note.      History      Chief Complaint   Patient presents with    Palpitations     c/o palpitations x 4 days. also with c/o shortness of breath. Symptoms worse when laying down       Review of patient's allergies indicates:   Allergen Reactions    Sulfa (sulfonamide antibiotics) Hives     Other reaction(s): Unknown    Sulfamethoxazole-trimethoprim Hives        HPI   HPI    6/29/2018, 12:48 AM   History obtained from the patient      History of Present Illness: Yoandy Forde is a 43 y.o. male patient who presents to the Emergency Department for an evaluation of SOB which onset gradually x4 days ago. Symptoms are constant and moderate in severity. Exacerbated by lying flat or on his side and relieved by nothing. Associated sxs include palpitations. Patient denies any fever, chills, diaphoresis, N/V, leg swelling, CP, dysuria, hematuria, recent travel, and all other sxs at this time. No further complaints or concerns at this time.       Arrival mode: Personal vehicle     PCP: Mary Feliciano MD       Past Medical History:  Past Medical History:   Diagnosis Date    Hyperglycemia     Hypertension     MCTD (mixed connective tissue disease)     Dr Branham     Vitiligo        Past Surgical History:  Past Surgical History:   Procedure Laterality Date    NASAL SEPTUM SURGERY      neck lipoma excision           Family History:  Family History   Problem Relation Age of Onset    Diabetes Mother     Hypertension Mother     Stroke Father     Hypertension Father     Asthma Brother     Heart disease Sister     Aneurysm Sister        Social History:  Social History     Social History Main Topics    Smoking status: Current Some Day Smoker     Packs/day: 0.50     Years: 15.00     Types: Cigarettes     Start date: 4/14/1995     Last attempt to quit: 10/20/2015    Smokeless tobacco: Never Used     Alcohol use 0.0 oz/week      Comment: occasional use    Drug use: Unknown    Sexual activity: Not on file       ROS   Review of Systems   Constitutional: Negative for chills and fever.        (-) Recent travel  (-) Long car trips   HENT: Negative for congestion, sore throat and trouble swallowing.    Respiratory: Positive for shortness of breath. Negative for cough and chest tightness.    Cardiovascular: Positive for palpitations. Negative for chest pain and leg swelling.   Gastrointestinal: Negative for abdominal pain, nausea and vomiting.   Genitourinary: Negative for dysuria, frequency, hematuria and urgency.   Musculoskeletal: Negative for back pain and neck pain.        (-) Calf tenderness   Skin: Negative for rash.   Neurological: Negative for dizziness, weakness, light-headedness, numbness and headaches.     Physical Exam      Initial Vitals [06/28/18 2333]   BP Pulse Resp Temp SpO2   (!) 168/83 82 18 98.4 °F (36.9 °C) 97 %      MAP       --          Physical Exam  Nursing Notes and Vital Signs Reviewed.  Constitutional: Patient is in no acute distress. Well-developed and well-nourished.  Head: Atraumatic. Normocephalic.  Eyes: PERRL. EOM intact. Conjunctivae are not pale. No scleral icterus.  ENT: Mucous membranes are moist. Oropharynx is clear and symmetric.    Neck: Supple. Full ROM. No lymphadenopathy.  Cardiovascular: Regular rate. Regular rhythm. No murmurs, rubs, or gallops. Distal pulses are 2+ and symmetric.  Pulmonary/Chest: No respiratory distress. Clear to auscultation bilaterally. No wheezing or rales.  Abdominal: Soft and non-distended.  There is no tenderness.  Musculoskeletal: Moves all extremities. No obvious deformities. No edema. No calf tenderness.  Skin: Warm and dry.  Neurological:  Alert, awake, and appropriate.  Normal speech.  No acute focal neurological deficits are appreciated.  Psychiatric: Normal affect. Good eye contact. Appropriate in content.    ED Course    Procedures  ED  "Vital Signs:  Vitals:    06/28/18 2333 06/29/18 0118   BP: (!) 168/83    Pulse: 82 71   Resp: 18    Temp: 98.4 °F (36.9 °C)    TempSrc: Oral    SpO2: 97%    Weight: 104.3 kg (230 lb)    Height: 5' 11" (1.803 m)        Abnormal Lab Results:  Labs Reviewed   CBC W/ AUTO DIFFERENTIAL - Abnormal; Notable for the following:        Result Value    MCH 31.4 (*)     Gran% 33.4 (*)     Lymph% 54.8 (*)     All other components within normal limits   COMPREHENSIVE METABOLIC PANEL - Abnormal; Notable for the following:     Sodium 128 (*)     CO2 22 (*)     Alkaline Phosphatase 52 (*)     Anion Gap 0 (*)     All other components within normal limits   URINALYSIS, REFLEX TO URINE CULTURE - Abnormal; Notable for the following:     Specific Gravity, UA >=1.030 (*)     All other components within normal limits    Narrative:     Preferred Collection Type->Urine, Clean Catch   CK - Abnormal; Notable for the following:      (*)     All other components within normal limits   B-TYPE NATRIURETIC PEPTIDE   TROPONIN I        All Lab Results:  Results for orders placed or performed during the hospital encounter of 06/29/18   CBC auto differential   Result Value Ref Range    WBC 5.29 3.90 - 12.70 K/uL    RBC 4.62 4.60 - 6.20 M/uL    Hemoglobin 14.5 14.0 - 18.0 g/dL    Hematocrit 41.3 40.0 - 54.0 %    MCV 89 82 - 98 fL    MCH 31.4 (H) 27.0 - 31.0 pg    MCHC 35.1 32.0 - 36.0 g/dL    RDW 13.7 11.5 - 14.5 %    Platelets 211 150 - 350 K/uL    MPV 10.3 9.2 - 12.9 fL    Gran # (ANC) 1.8 1.8 - 7.7 K/uL    Lymph # 2.9 1.0 - 4.8 K/uL    Mono # 0.3 0.3 - 1.0 K/uL    Eos # 0.3 0.0 - 0.5 K/uL    Baso # 0.03 0.00 - 0.20 K/uL    Gran% 33.4 (L) 38.0 - 73.0 %    Lymph% 54.8 (H) 18.0 - 48.0 %    Mono% 5.9 4.0 - 15.0 %    Eosinophil% 5.3 0.0 - 8.0 %    Basophil% 0.6 0.0 - 1.9 %    Differential Method Automated    Comprehensive metabolic panel   Result Value Ref Range    Sodium 128 (L) 136 - 145 mmol/L    Potassium 3.6 3.5 - 5.1 mmol/L    Chloride 106 95 " - 110 mmol/L    CO2 22 (L) 23 - 29 mmol/L    Glucose 98 70 - 110 mg/dL    BUN, Bld 18 6 - 20 mg/dL    Creatinine 1.2 0.5 - 1.4 mg/dL    Calcium 9.3 8.7 - 10.5 mg/dL    Total Protein 7.0 6.0 - 8.4 g/dL    Albumin 4.1 3.5 - 5.2 g/dL    Total Bilirubin 0.3 0.1 - 1.0 mg/dL    Alkaline Phosphatase 52 (L) 55 - 135 U/L    AST 23 10 - 40 U/L    ALT 19 10 - 44 U/L    Anion Gap 0 (L) 8 - 16 mmol/L    eGFR if African American >60 >60 mL/min/1.73 m^2    eGFR if non African American >60 >60 mL/min/1.73 m^2   Urinalysis, Reflex to Urine Culture Urine, Clean Catch   Result Value Ref Range    Specimen UA Urine, Clean Catch     Color, UA Yellow Yellow, Straw, Rhianna    Appearance, UA Clear Clear    pH, UA 6.0 5.0 - 8.0    Specific Gravity, UA >=1.030 (A) 1.005 - 1.030    Protein, UA Negative Negative    Glucose, UA Negative Negative    Ketones, UA Negative Negative    Bilirubin (UA) Negative Negative    Occult Blood UA Negative Negative    Nitrite, UA Negative Negative    Urobilinogen, UA Negative <2.0 EU/dL    Leukocytes, UA Negative Negative   Brain natriuretic peptide   Result Value Ref Range    BNP <10 0 - 99 pg/mL   CK   Result Value Ref Range     (H) 20 - 200 U/L   Troponin I   Result Value Ref Range    Troponin I <0.006 0.000 - 0.026 ng/mL         Imaging Results:  Interpreted by virtual imaging.  Study: X-ray Chest PA and Lateral  Findings: Per virtual imaging, NAF         The EKG was ordered, reviewed, and independently interpreted by the ED provider.  Interpretation time: 0:19  Rate: 72 BPM  Rhythm: normal sinus rhythm  Interpretation: No STEMI.      The Emergency Provider reviewed the vital signs and test results, which are outlined above.    ED Discussion     2:02 AM: Reassessed pt at this time. Pt is awake, alert, and in NAD. Pt states his condition has improved at this time. Discussed with pt all pertinent ED information and results. Discussed pt dx and plan of tx. Gave pt all f/u and return to the ED  instructions. All questions and concerns were addressed at this time. Pt expresses understanding of information and instructions, and is comfortable with plan to discharge. Pt is stable for discharge.    I discussed with patient and/or family/caretaker that evaluation in the ED does not suggest any emergent or life threatening medical conditions requiring immediate intervention beyond what was provided in the ED, and I believe patient is safe for discharge.  Regardless, an unremarkable evaluation in the ED does not preclude the development or presence of a serious of life threatening condition. As such, patient was instructed to return immediately for any worsening or change in current symptoms.      ED Medication(s):  Medications - No data to display    New Prescriptions    No medications on file       Follow-up Information     Mary Feliciano MD In 3 days.    Specialty:  Family Medicine  Contact information:  [address]  [place] [address] [phone]                     Medical Decision Making    Medical Decision Making:   Clinical Tests:   Lab Tests: Ordered and Reviewed  Radiological Study: Ordered and Reviewed  Medical Tests: Ordered and Reviewed           Scribe Attestation:   Scribe #1: I performed the above scribed service and the documentation accurately describes the services I performed. I attest to the accuracy of the note.    Attending:   Physician Attestation Statement for Scribe #1: I, Parveen Hinojosa MD, personally performed the services described in this documentation, as scribed by Cristopher Mckeon, in my presence, and it is both accurate and complete.          Clinical Impression       ICD-10-CM ICD-9-CM   1. Chest pain, unspecified type R07.9 786.50   2. Palpitations R00.2 785.1       Disposition:   Disposition: Discharged  Condition: Stable         Parveen Hinojosa MD  06/29/18 0208

## 2018-08-06 ENCOUNTER — TELEPHONE (OUTPATIENT)
Dept: DERMATOLOGY | Facility: CLINIC | Age: 43
End: 2018-08-06

## 2018-08-06 NOTE — TELEPHONE ENCOUNTER
Spoke to patient and scheduled him with an appointment. Will forward medication question to junior.

## 2018-08-06 NOTE — TELEPHONE ENCOUNTER
----- Message from Rosy Horn sent at 8/6/2018  3:13 PM CDT -----  Contact: PT  HE'S CALLING IN REGARDS TO SCHEDULE AN APPT FOR [facility] ,PLS CALL PT BACK [phone](VGHK)

## 2018-11-01 RX ORDER — LISINOPRIL AND HYDROCHLOROTHIAZIDE 12.5; 2 MG/1; MG/1
1 TABLET ORAL DAILY
Qty: 90 TABLET | Refills: 0 | Status: SHIPPED | OUTPATIENT
Start: 2018-11-01 | End: 2019-05-08 | Stop reason: SDUPTHER

## 2018-11-19 ENCOUNTER — OFFICE VISIT (OUTPATIENT)
Dept: INTERNAL MEDICINE | Facility: CLINIC | Age: 43
End: 2018-11-19
Payer: COMMERCIAL

## 2018-11-19 VITALS
OXYGEN SATURATION: 98 % | HEART RATE: 99 BPM | HEIGHT: 71 IN | WEIGHT: 245.81 LBS | DIASTOLIC BLOOD PRESSURE: 80 MMHG | BODY MASS INDEX: 34.41 KG/M2 | TEMPERATURE: 99 F | SYSTOLIC BLOOD PRESSURE: 138 MMHG

## 2018-11-19 DIAGNOSIS — M35.01 SJOGREN'S SYNDROME WITH KERATOCONJUNCTIVITIS SICCA: ICD-10-CM

## 2018-11-19 DIAGNOSIS — K21.9 GASTROESOPHAGEAL REFLUX DISEASE, ESOPHAGITIS PRESENCE NOT SPECIFIED: ICD-10-CM

## 2018-11-19 DIAGNOSIS — M35.9 UNDIFFERENTIATED CONNECTIVE TISSUE DISEASE: ICD-10-CM

## 2018-11-19 DIAGNOSIS — K64.9 HEMORRHOIDS, UNSPECIFIED HEMORRHOID TYPE: ICD-10-CM

## 2018-11-19 DIAGNOSIS — E66.9 OBESITY (BMI 30.0-34.9): ICD-10-CM

## 2018-11-19 DIAGNOSIS — Z72.0 TOBACCO ABUSE: ICD-10-CM

## 2018-11-19 DIAGNOSIS — K62.89 RECTAL PAIN: Primary | ICD-10-CM

## 2018-11-19 DIAGNOSIS — I10 ESSENTIAL HYPERTENSION: ICD-10-CM

## 2018-11-19 DIAGNOSIS — Z29.9 PREVENTIVE MEASURE: ICD-10-CM

## 2018-11-19 DIAGNOSIS — E55.9 VITAMIN D DEFICIENCY: ICD-10-CM

## 2018-11-19 PROCEDURE — 3008F BODY MASS INDEX DOCD: CPT | Mod: CPTII,S$GLB,, | Performed by: FAMILY MEDICINE

## 2018-11-19 PROCEDURE — 99214 OFFICE O/P EST MOD 30 MIN: CPT | Mod: S$GLB,,, | Performed by: FAMILY MEDICINE

## 2018-11-19 PROCEDURE — 99999 PR PBB SHADOW E&M-EST. PATIENT-LVL III: CPT | Mod: PBBFAC,,, | Performed by: FAMILY MEDICINE

## 2018-11-19 PROCEDURE — 3079F DIAST BP 80-89 MM HG: CPT | Mod: CPTII,S$GLB,, | Performed by: FAMILY MEDICINE

## 2018-11-19 PROCEDURE — 3075F SYST BP GE 130 - 139MM HG: CPT | Mod: CPTII,S$GLB,, | Performed by: FAMILY MEDICINE

## 2018-11-19 RX ORDER — LISINOPRIL 20 MG/1
20 TABLET ORAL
COMMUNITY
End: 2018-11-19

## 2018-11-19 RX ORDER — DOXYCYCLINE 100 MG/1
CAPSULE ORAL
Refills: 0 | COMMUNITY
Start: 2018-11-01 | End: 2018-11-19

## 2018-11-19 RX ORDER — CEFUROXIME AXETIL 250 MG/1
TABLET ORAL
Refills: 0 | COMMUNITY
Start: 2018-11-09 | End: 2019-03-24 | Stop reason: CLARIF

## 2018-11-19 RX ORDER — HYDROXYCHLOROQUINE SULFATE 200 MG/1
TABLET, FILM COATED ORAL
COMMUNITY
End: 2018-11-19 | Stop reason: SDUPTHER

## 2018-11-19 RX ORDER — FENOFIBRATE 50 MG/1
50 CAPSULE ORAL
COMMUNITY
End: 2019-01-18

## 2018-11-19 RX ORDER — HYDROCORTISONE ACETATE PRAMOXINE HCL 2.5; 1 G/100G; G/100G
CREAM TOPICAL 3 TIMES DAILY
Qty: 1 TUBE | Refills: 0 | Status: SHIPPED | OUTPATIENT
Start: 2018-11-19 | End: 2019-09-30

## 2018-11-19 NOTE — PROGRESS NOTES
"Subjective:       Patient ID: Yoandy Forde is a 43 y.o. male.    Chief Complaint: Hemorrhoids (x 2 wks)    43-year-old  male patient with Patient Active Problem List:     Dermatophytosis of other specified sites     Undifferentiated connective tissue disease     Essential hypertension     Hemorrhoids     GERD (gastroesophageal reflux disease)     Sjogren's syndrome     Medication monitoring encounter     Tobacco abuse     Vitamin D deficiency     Obesity (BMI 30.0-34.9)  Here reports that he started to have rectal pain for the past 2 weeks 10/10, has tried using over-the-counter preparation H but no response noted  Denies any significant blood with wiping but reports rectal discomfort  Patient has been diagnosed with sinus infection and currently taking antibiotics  Denies any chest pain or difficulty breathing  Reports no constipation and has been drinking adequate  Fluids  Continues to smoke      Review of Systems   Gastrointestinal: Positive for rectal pain. Negative for blood in stool and constipation.   Genitourinary: Negative for frequency and hematuria.         /80 (BP Location: Right arm, Patient Position: Sitting, BP Method: Large (Manual))   Pulse 99   Temp 98.7 °F (37.1 °C) (Tympanic)   Ht 5' 11" (1.803 m)   Wt 111.5 kg (245 lb 13 oz)   SpO2 98%   BMI 34.28 kg/m²   Objective:      Physical Exam   Constitutional: He is oriented to person, place, and time. He appears well-developed and well-nourished.   HENT:   Head: Normocephalic and atraumatic.   Mouth/Throat: Oropharynx is clear and moist.   Cardiovascular: Normal rate, regular rhythm and normal heart sounds.   No murmur heard.  Pulmonary/Chest: Effort normal and breath sounds normal. He has no wheezes.   Abdominal: Soft. Bowel sounds are normal. There is no tenderness.   Genitourinary: Rectum normal.   Genitourinary Comments: No external hemorrhoids noted  Patient positive for rectal pain/tenderness on exam  No significant " anal fissure identified  Skin tag noted in the rectal area   Musculoskeletal: He exhibits no edema.   Neurological: He is alert and oriented to person, place, and time.   Skin: Skin is warm and dry. No rash noted.   Psychiatric: He has a normal mood and affect.         Assessment:       1. Rectal pain    2. Hemorrhoids, unspecified hemorrhoid type    3. Vitamin D deficiency    4. Preventive measure    5. Essential hypertension    6. Undifferentiated connective tissue disease    7. Gastroesophageal reflux disease, esophagitis presence not specified    8. Sjogren's syndrome with keratoconjunctivitis sicca    9. Tobacco abuse    10. Obesity (BMI 30.0-34.9)        Plan:   Rectal pain  -     hydrocortisone-pramoxine (ANALPRAM-HC) 2.5-1 % Crea; Place rectally 3 (three) times daily. Apply to rectum  Dispense: 1 Tube; Refill: 0  -     Ambulatory referral to Gastroenterology  Prescription hemorrhoidal cream prescribed today  Secondary to severe rectal pain patient was advised to follow up with Gastroenterology to rule out anal fissure which was not identified on exam today  Advised to try Sitz baths    Hemorrhoids, unspecified hemorrhoid type-encouraged to drink adequate fluids    Vitamin D deficiency-currently taking over-the-counter vitamin-D supplements    Preventive measure  -     CBC auto differential; Future; Expected date: 11/19/2018  -     Comprehensive metabolic panel; Future; Expected date: 11/19/2018  -     Lipid panel; Future; Expected date: 11/19/2018  -     TSH; Future; Expected date: 11/19/2018  -     Vitamin D; Future; Expected date: 11/19/2018  -     PSA, Screening; Future; Expected date: 11/19/2018  -     Urinalysis; Future; Expected date: 11/19/2018  Will check fasting labs and follow-up in 10 days    Essential hypertension-blood pressure is stable today continue current regimen-lisinopril hydrochlorothiazide 10/12.5 mg daily    Undifferentiated connective tissue disease    Gastroesophageal reflux disease,  esophagitis presence not specified    Sjogren's syndrome with keratoconjunctivitis sicca    Tobacco abuse-encouraged to quit smoking    Obesity (BMI 30.0-34.9)-Lifestyle modifications recommended to lose weight with BMI 34

## 2018-11-20 ENCOUNTER — LAB VISIT (OUTPATIENT)
Dept: LAB | Facility: HOSPITAL | Age: 43
End: 2018-11-20
Attending: FAMILY MEDICINE
Payer: COMMERCIAL

## 2018-11-20 DIAGNOSIS — Z29.9 PREVENTIVE MEASURE: ICD-10-CM

## 2018-11-20 LAB
25(OH)D3+25(OH)D2 SERPL-MCNC: 20 NG/ML
ALBUMIN SERPL BCP-MCNC: 4 G/DL
ALP SERPL-CCNC: 49 U/L
ALT SERPL W/O P-5'-P-CCNC: 21 U/L
ANION GAP SERPL CALC-SCNC: 6 MMOL/L
AST SERPL-CCNC: 25 U/L
BASOPHILS # BLD AUTO: 0.05 K/UL
BASOPHILS NFR BLD: 1 %
BILIRUB SERPL-MCNC: 0.5 MG/DL
BUN SERPL-MCNC: 17 MG/DL
CALCIUM SERPL-MCNC: 9.3 MG/DL
CHLORIDE SERPL-SCNC: 106 MMOL/L
CHOLEST SERPL-MCNC: 156 MG/DL
CHOLEST/HDLC SERPL: 4.1 {RATIO}
CO2 SERPL-SCNC: 26 MMOL/L
COMPLEXED PSA SERPL-MCNC: 0.37 NG/ML
CREAT SERPL-MCNC: 1.2 MG/DL
DIFFERENTIAL METHOD: ABNORMAL
EOSINOPHIL # BLD AUTO: 0.3 K/UL
EOSINOPHIL NFR BLD: 5.3 %
ERYTHROCYTE [DISTWIDTH] IN BLOOD BY AUTOMATED COUNT: 13.4 %
EST. GFR  (AFRICAN AMERICAN): >60 ML/MIN/1.73 M^2
EST. GFR  (NON AFRICAN AMERICAN): >60 ML/MIN/1.73 M^2
GLUCOSE SERPL-MCNC: 92 MG/DL
HCT VFR BLD AUTO: 44.6 %
HDLC SERPL-MCNC: 38 MG/DL
HDLC SERPL: 24.4 %
HGB BLD-MCNC: 14.8 G/DL
IMM GRANULOCYTES # BLD AUTO: 0.02 K/UL
IMM GRANULOCYTES NFR BLD AUTO: 0.4 %
LDLC SERPL CALC-MCNC: 100.8 MG/DL
LYMPHOCYTES # BLD AUTO: 2.3 K/UL
LYMPHOCYTES NFR BLD: 44.4 %
MCH RBC QN AUTO: 30 PG
MCHC RBC AUTO-ENTMCNC: 33.2 G/DL
MCV RBC AUTO: 90 FL
MONOCYTES # BLD AUTO: 0.6 K/UL
MONOCYTES NFR BLD: 11.5 %
NEUTROPHILS # BLD AUTO: 1.9 K/UL
NEUTROPHILS NFR BLD: 37.4 %
NONHDLC SERPL-MCNC: 118 MG/DL
NRBC BLD-RTO: 0 /100 WBC
PLATELET # BLD AUTO: 220 K/UL
PMV BLD AUTO: 10.6 FL
POTASSIUM SERPL-SCNC: 4.2 MMOL/L
PROT SERPL-MCNC: 7.1 G/DL
RBC # BLD AUTO: 4.94 M/UL
SODIUM SERPL-SCNC: 138 MMOL/L
TRIGL SERPL-MCNC: 86 MG/DL
TSH SERPL DL<=0.005 MIU/L-ACNC: 1.96 UIU/ML
WBC # BLD AUTO: 5.11 K/UL

## 2018-11-20 PROCEDURE — 82306 VITAMIN D 25 HYDROXY: CPT

## 2018-11-20 PROCEDURE — 36415 COLL VENOUS BLD VENIPUNCTURE: CPT | Mod: PO

## 2018-11-20 PROCEDURE — 85025 COMPLETE CBC W/AUTO DIFF WBC: CPT

## 2018-11-20 PROCEDURE — 84153 ASSAY OF PSA TOTAL: CPT

## 2018-11-20 PROCEDURE — 80053 COMPREHEN METABOLIC PANEL: CPT

## 2018-11-20 PROCEDURE — 80061 LIPID PANEL: CPT

## 2018-11-20 PROCEDURE — 84443 ASSAY THYROID STIM HORMONE: CPT

## 2018-11-21 ENCOUNTER — TELEPHONE (OUTPATIENT)
Dept: INTERNAL MEDICINE | Facility: CLINIC | Age: 43
End: 2018-11-21

## 2018-11-21 DIAGNOSIS — E55.9 VITAMIN D DEFICIENCY: Primary | ICD-10-CM

## 2018-11-21 RX ORDER — ERGOCALCIFEROL 1.25 MG/1
50000 CAPSULE ORAL
Qty: 10 CAPSULE | Refills: 0 | Status: SHIPPED | OUTPATIENT
Start: 2018-11-21 | End: 2019-04-22 | Stop reason: SDUPTHER

## 2018-11-21 NOTE — TELEPHONE ENCOUNTER
----- Message from Sree Banks sent at 11/21/2018  9:06 AM CST -----  Contact: Yoandy [phone]  Pt is calling about the cream that was prescribed several day ago. Insurance does not cover it.

## 2018-11-23 ENCOUNTER — TELEPHONE (OUTPATIENT)
Dept: INTERNAL MEDICINE | Facility: CLINIC | Age: 43
End: 2018-11-23

## 2018-11-23 NOTE — TELEPHONE ENCOUNTER
Spoke with pt. He said the cream he was prescribed was $200( insurance would not cover it) He would like to know if there is another cream that can be prescribed at lower cost. Please advise.

## 2018-11-26 ENCOUNTER — TELEPHONE (OUTPATIENT)
Dept: INTERNAL MEDICINE | Facility: CLINIC | Age: 43
End: 2018-11-26

## 2018-11-26 NOTE — TELEPHONE ENCOUNTER
Maternal labs significant for unknown GBS. Mom ruptured at home but approximated at 2 hrs prior to delivery. Due to severe acidemia started ceftazidime instead of gentamicin and initiated ampicillin. Admit CBC with WBC 24.56 and bands of 4. Continued empiric antibiotics and followed up labs at 12 hrs. 3/11 CBC and CRP at 12 hrs wnl. 3/12 Infant clinically improved with continue normal labs. Blood culture negative to date  Plan: Follow blood culture until final. Follow clinically.    Spoke with pt. Advised pt to continue to use Preparation H cream until he sees gastroenterology on 12/3/18. Pt acknowledged understanding and was thankful for the call back. Call ended well.

## 2018-11-26 NOTE — TELEPHONE ENCOUNTER
----- Message from Julieta Olivas sent at 11/26/2018  8:27 AM CST -----  Contact: [phone]  Returning call please call back. At [phone].  Thanks sj

## 2019-01-14 ENCOUNTER — HOSPITAL ENCOUNTER (OUTPATIENT)
Dept: RADIOLOGY | Facility: HOSPITAL | Age: 44
Discharge: HOME | End: 2019-01-14
Attending: PODIATRIST
Payer: COMMERCIAL

## 2019-01-14 ENCOUNTER — IMMUNIZATION (OUTPATIENT)
Dept: INTERNAL MEDICINE | Facility: CLINIC | Age: 44
End: 2019-01-14
Payer: COMMERCIAL

## 2019-01-14 ENCOUNTER — OFFICE VISIT (OUTPATIENT)
Dept: PODIATRY | Facility: CLINIC | Age: 44
End: 2019-01-14
Payer: COMMERCIAL

## 2019-01-14 VITALS
SYSTOLIC BLOOD PRESSURE: 133 MMHG | HEART RATE: 94 BPM | BODY MASS INDEX: 34.88 KG/M2 | HEIGHT: 71 IN | DIASTOLIC BLOOD PRESSURE: 83 MMHG | WEIGHT: 249.13 LBS

## 2019-01-14 DIAGNOSIS — M77.52 OS PERONEUM SYNDROME OF LEFT FOOT: ICD-10-CM

## 2019-01-14 DIAGNOSIS — M79.672 LEFT FOOT PAIN: ICD-10-CM

## 2019-01-14 DIAGNOSIS — M76.72 PERONEAL TENDINITIS, LEFT: Primary | ICD-10-CM

## 2019-01-14 PROCEDURE — 73630 X-RAY EXAM OF FOOT: CPT | Mod: 26,LT,, | Performed by: RADIOLOGY

## 2019-01-14 PROCEDURE — 73630 X-RAY EXAM OF FOOT: CPT | Mod: TC,LT

## 2019-01-14 PROCEDURE — 99214 PR OFFICE/OUTPT VISIT, EST, LEVL IV, 30-39 MIN: ICD-10-PCS | Mod: S$GLB,,, | Performed by: PODIATRIST

## 2019-01-14 PROCEDURE — 90686 IIV4 VACC NO PRSV 0.5 ML IM: CPT | Mod: S$GLB,,, | Performed by: FAMILY MEDICINE

## 2019-01-14 PROCEDURE — 3075F PR MOST RECENT SYSTOLIC BLOOD PRESS GE 130-139MM HG: ICD-10-PCS | Mod: CPTII,S$GLB,, | Performed by: PODIATRIST

## 2019-01-14 PROCEDURE — 3075F SYST BP GE 130 - 139MM HG: CPT | Mod: CPTII,S$GLB,, | Performed by: PODIATRIST

## 2019-01-14 PROCEDURE — 3008F BODY MASS INDEX DOCD: CPT | Mod: CPTII,S$GLB,, | Performed by: PODIATRIST

## 2019-01-14 PROCEDURE — 73630 XR FOOT COMPLETE 3 VIEW LEFT: ICD-10-PCS | Mod: 26,LT,, | Performed by: RADIOLOGY

## 2019-01-14 PROCEDURE — 90686 FLU VACCINE (QUAD) GREATER THAN OR EQUAL TO 3YO PRESERVATIVE FREE IM: ICD-10-PCS | Mod: S$GLB,,, | Performed by: FAMILY MEDICINE

## 2019-01-14 PROCEDURE — 99999 PR PBB SHADOW E&M-EST. PATIENT-LVL III: ICD-10-PCS | Mod: PBBFAC,,, | Performed by: PODIATRIST

## 2019-01-14 PROCEDURE — 99999 PR PBB SHADOW E&M-EST. PATIENT-LVL III: CPT | Mod: PBBFAC,,, | Performed by: PODIATRIST

## 2019-01-14 PROCEDURE — 99214 OFFICE O/P EST MOD 30 MIN: CPT | Mod: S$GLB,,, | Performed by: PODIATRIST

## 2019-01-14 PROCEDURE — 90471 FLU VACCINE (QUAD) GREATER THAN OR EQUAL TO 3YO PRESERVATIVE FREE IM: ICD-10-PCS | Mod: S$GLB,,, | Performed by: FAMILY MEDICINE

## 2019-01-14 PROCEDURE — 3008F PR BODY MASS INDEX (BMI) DOCUMENTED: ICD-10-PCS | Mod: CPTII,S$GLB,, | Performed by: PODIATRIST

## 2019-01-14 PROCEDURE — 90471 IMMUNIZATION ADMIN: CPT | Mod: S$GLB,,, | Performed by: FAMILY MEDICINE

## 2019-01-14 PROCEDURE — 3079F DIAST BP 80-89 MM HG: CPT | Mod: CPTII,S$GLB,, | Performed by: PODIATRIST

## 2019-01-14 PROCEDURE — 3079F PR MOST RECENT DIASTOLIC BLOOD PRESSURE 80-89 MM HG: ICD-10-PCS | Mod: CPTII,S$GLB,, | Performed by: PODIATRIST

## 2019-01-14 RX ORDER — NABUMETONE 750 MG/1
750 TABLET, FILM COATED ORAL 2 TIMES DAILY
Qty: 30 TABLET | Refills: 1 | Status: SHIPPED | OUTPATIENT
Start: 2019-01-14 | End: 2019-03-24 | Stop reason: CLARIF

## 2019-01-14 NOTE — PROGRESS NOTES
Subjective:       Patient ID: Yoandy Forde is a 43 y.o. male.    Chief Complaint: Foot Pain (left foot pain, rated 7/10 with everyday activity, duration 1 month, pcp  Mary Feliciano)    HPI: Yoandy Forde presents to the office today with complaints of moderate pains at the lateral aspect of the left foot.  Patient rates the pain at approximately 7/10.  States severely antalgic gait pattern.  States moderate edema. States weakness with gait. The patient states the pain does radiate up the outside of the ankle/lower leg at times. States no recent trauma.  States seldom NSAID type medications for alleviation. States no prior xray evaluation.  The pains been present now for the past several weeks. Mary Feliciano MD is the primary care provider.  Prolonged walking and standing does exacerbate the symptoms.    Review of patient's allergies indicates:   Allergen Reactions    Sulfa (sulfonamide antibiotics) Hives     Other reaction(s): Unknown    Sulfamethoxazole-trimethoprim Hives       Past Medical History:   Diagnosis Date    Hyperglycemia     Hypertension     MCTD (mixed connective tissue disease)     Dr Branham     Vitiligo        Family History   Problem Relation Age of Onset    Diabetes Mother     Hypertension Mother     Stroke Father     Hypertension Father     Asthma Brother     Heart disease Sister     Aneurysm Sister        Social History     Socioeconomic History    Marital status:      Spouse name: ASHWIN    Number of children: 2    Years of education: Not on file    Highest education level: Not on file   Social Needs    Financial resource strain: Not on file    Food insecurity - worry: Not on file    Food insecurity - inability: Not on file    Transportation needs - medical: Not on file    Transportation needs - non-medical: Not on file   Occupational History    Occupation: [employer]     Employer: [employer]   Tobacco Use    Smoking status: Current Some Day Smoker     Packs/day: 0.50      "Years: 15.00     Pack years: 7.50     Types: Cigarettes     Start date: 4/14/1995     Last attempt to quit: 10/20/2015     Years since quitting: 3.2    Smokeless tobacco: Never Used   Substance and Sexual Activity    Alcohol use: Yes     Alcohol/week: 0.0 oz     Comment: occasional use    Drug use: Not on file    Sexual activity: Not on file   Other Topics Concern    Not on file   Social History Narrative    Not on file       Past Surgical History:   Procedure Laterality Date    NASAL SEPTUM SURGERY      neck lipoma excision         Review of Systems   Constitutional: Negative for chills, fatigue and fever.   HENT: Negative for hearing loss.    Eyes: Negative for photophobia and visual disturbance.   Respiratory: Negative for cough, chest tightness, shortness of breath and wheezing.    Cardiovascular: Negative for chest pain and palpitations.   Gastrointestinal: Negative for constipation, diarrhea, nausea and vomiting.   Endocrine: Negative for cold intolerance and heat intolerance.   Genitourinary: Negative for flank pain.   Musculoskeletal: Positive for gait problem. Negative for neck pain and neck stiffness.   Skin: Negative for wound.   Neurological: Negative for light-headedness, numbness and headaches.   Psychiatric/Behavioral: Negative for sleep disturbance.          Objective:   /83 (BP Location: Left arm, Patient Position: Sitting, BP Method: Medium (Automatic))   Pulse 94   Ht 5' 11" (1.803 m)   Wt 113 kg (249 lb 1.9 oz)   BMI 34.75 kg/m²         LOWER EXTREMITY PHYSICAL EXAMINATION  ORTHOPEDIC:  Moderate discomfort along the course of the left peroneal tendon. Mild-to-moderate edema is noted along the course.  Mild-to-moderate retromalleolar edema noted. No subluxing peroneals are noted. Mild discomfort to palpation lateral malleolus.  Moderate to severe pain to palpation of the 5th metatarsal base as well as the lateral aspect of the calcaneus. no discomfort to palpation of 5th " metatarsal proximal metaphysis and diaphysis.  There is no discomfort to the sinus tarsi. There is no discomfort upon palpation to the ATFL, to the CFL and to the PTFL. Gait pattern is antalgic. MMT with isolation of peroneal tendon, is weak as compared to contralateral.    DERMATOLOGY: Skin is supple, dry and intact. No ecchymosis is noted. No hypertrophic skin formation. No erythema or cellulitis is noted.    NEUROLOGY: Sensation to light touch is intact. Proprioception is intact. Sensation to pin prick is intact. Deep tendon reflexes of the lower extremity are WNL.    VASCULAR: On the left foot, the dorsalis pedis pulse is 2/4 and the posterior tibial pulse is 2/4. Capillary refill time is less than 3 seconds. Hair growth is present on the dorsum of the foot and at the digits. No rubor is present. Proximal to distal temperature is warm to warm.      Assessment:     1. Peroneal tendinitis, left    2. Os peroneum syndrome of left foot    3. Left foot pain        Plan:     Peroneal tendinitis, left    Os peroneum syndrome of left foot    Left foot pain  -     X-Ray Foot Complete Left; Future; Expected date: 01/14/2019        Thorough discussion is had with the patient today, concerning the diagnosis, its etiology, and the treatment algorithm at present.  XRAYS are reviewed in detail with the patient. All questions and concerns regarding findings and its/their implications are outlined and discussed.  Os peroneum and peroneal tendinitis, left lower extremity.  Patient will need prolonged protected weight-bearing in a walking boot as well as high-dose NSAID.  Thorough discussion is had with the patient today, concerning the diagnosis, its etiology, and the treatment algorithm at present.  Patient will follow up in approximately 2 weeks.            Future Appointments   Date Time Provider Department Center   1/28/2019  3:45 PM Kiel Shah DPM [facility]

## 2019-01-14 NOTE — TELEPHONE ENCOUNTER
----- Message from Tanja Monsalve sent at 1/14/2019  2:28 PM CST -----  1. What is the name of the medication you are requesting? Hydroxychlorquine  2. What is the dose? 200mg  3. How do you take the medication? Orally, topically, etc? orally  4. How often do you take this medication? Takes 2 pills per day  5. Do you need a 30 day or 90 day supply? 90  6. How many refills are you requesting? 1  7. What is your preferred pharmacy and location of the pharmacy? [facility]  8. Who can we contact with further questions? [phone]

## 2019-01-15 RX ORDER — HYDROXYCHLOROQUINE SULFATE 200 MG/1
200 TABLET, FILM COATED ORAL 2 TIMES DAILY
Qty: 60 TABLET | Refills: 4 | Status: SHIPPED | OUTPATIENT
Start: 2019-01-15 | End: 2019-03-11 | Stop reason: SDUPTHER

## 2019-01-18 RX ORDER — FENOFIBRATE 145 MG/1
TABLET, FILM COATED ORAL
Qty: 90 TABLET | Refills: 0 | Status: SHIPPED | OUTPATIENT
Start: 2019-01-18 | End: 2019-07-04 | Stop reason: SDUPTHER

## 2019-01-28 ENCOUNTER — OFFICE VISIT (OUTPATIENT)
Dept: PODIATRY | Facility: CLINIC | Age: 44
End: 2019-01-28
Payer: COMMERCIAL

## 2019-01-28 VITALS
DIASTOLIC BLOOD PRESSURE: 88 MMHG | HEART RATE: 101 BPM | SYSTOLIC BLOOD PRESSURE: 141 MMHG | RESPIRATION RATE: 16 BRPM | HEIGHT: 71 IN | WEIGHT: 242.31 LBS | BODY MASS INDEX: 33.92 KG/M2

## 2019-01-28 DIAGNOSIS — M79.672 LEFT FOOT PAIN: ICD-10-CM

## 2019-01-28 DIAGNOSIS — M35.00 SJOGREN'S SYNDROME, WITH UNSPECIFIED ORGAN INVOLVEMENT: ICD-10-CM

## 2019-01-28 DIAGNOSIS — M76.72 PERONEAL TENDINITIS, LEFT: Primary | ICD-10-CM

## 2019-01-28 DIAGNOSIS — M77.52 OS PERONEUM SYNDROME OF LEFT FOOT: ICD-10-CM

## 2019-01-28 DIAGNOSIS — M13.0 POLYARTICULAR ARTHRITIS: ICD-10-CM

## 2019-01-28 DIAGNOSIS — M35.9 UNDIFFERENTIATED CONNECTIVE TISSUE DISEASE: ICD-10-CM

## 2019-01-28 PROCEDURE — 99214 OFFICE O/P EST MOD 30 MIN: CPT | Mod: S$GLB,,, | Performed by: PODIATRIST

## 2019-01-28 PROCEDURE — 3079F PR MOST RECENT DIASTOLIC BLOOD PRESSURE 80-89 MM HG: ICD-10-PCS | Mod: CPTII,S$GLB,, | Performed by: PODIATRIST

## 2019-01-28 PROCEDURE — 3008F BODY MASS INDEX DOCD: CPT | Mod: CPTII,S$GLB,, | Performed by: PODIATRIST

## 2019-01-28 PROCEDURE — 3008F PR BODY MASS INDEX (BMI) DOCUMENTED: ICD-10-PCS | Mod: CPTII,S$GLB,, | Performed by: PODIATRIST

## 2019-01-28 PROCEDURE — 99999 PR PBB SHADOW E&M-EST. PATIENT-LVL III: ICD-10-PCS | Mod: PBBFAC,,, | Performed by: PODIATRIST

## 2019-01-28 PROCEDURE — 99999 PR PBB SHADOW E&M-EST. PATIENT-LVL III: CPT | Mod: PBBFAC,,, | Performed by: PODIATRIST

## 2019-01-28 PROCEDURE — 3077F SYST BP >= 140 MM HG: CPT | Mod: CPTII,S$GLB,, | Performed by: PODIATRIST

## 2019-01-28 PROCEDURE — 99214 PR OFFICE/OUTPT VISIT, EST, LEVL IV, 30-39 MIN: ICD-10-PCS | Mod: S$GLB,,, | Performed by: PODIATRIST

## 2019-01-28 PROCEDURE — 3079F DIAST BP 80-89 MM HG: CPT | Mod: CPTII,S$GLB,, | Performed by: PODIATRIST

## 2019-01-28 PROCEDURE — 3077F PR MOST RECENT SYSTOLIC BLOOD PRESSURE >= 140 MM HG: ICD-10-PCS | Mod: CPTII,S$GLB,, | Performed by: PODIATRIST

## 2019-01-28 RX ORDER — PREDNISONE 10 MG/1
30 TABLET ORAL DAILY
Qty: 15 TABLET | Refills: 0 | Status: SHIPPED | OUTPATIENT
Start: 2019-01-28 | End: 2019-02-02

## 2019-01-28 NOTE — PROGRESS NOTES
Subjective:       Patient ID: Yoandy Forde is a 43 y.o. male.    Chief Complaint: Follow-up (boot f/u, lefft foot, rates pain 6/10, wears walking boot with socks, non-diabetic Pt, PCP Dr. Feliciano.)    HPI: Yoandy Forde presents to the office today, for follow-up concerning left peroneal brevis tendinitis as well as os perineum syndrome.  Patient presents ambulatory with an [employer]irc[employer] walking boot.  He states his symptoms at the aforementioned area have completely resolved.  He does now state moderate discomfort at the dorsal lateral aspect of the left foot in the area of the 4th and 3rd tarsometatarsal taking lesion.  Denies recent trauma.  Denies swelling. He does have a history of Sjogren's disease as well as a mixed connective tissue disease.  Patient denies local or systemic signs infection. Mary Feliciano MD is his primary care provider.  He states wearing the boot religiously since his last evaluation approximately 2 weeks ago. He does continue the oral NSAID.    Review of patient's allergies indicates:   Allergen Reactions    Sulfa (sulfonamide antibiotics) Hives     Other reaction(s): Unknown    Sulfamethoxazole-trimethoprim Hives       Past Medical History:   Diagnosis Date    Hyperglycemia     Hypertension     MCTD (mixed connective tissue disease)     Dr Branham     Vitiligo        Family History   Problem Relation Age of Onset    Diabetes Mother     Hypertension Mother     Stroke Father     Hypertension Father     Asthma Brother     Heart disease Sister     Aneurysm Sister        Social History     Socioeconomic History    Marital status:      Spouse name: ASHWIN    Number of children: 2    Years of education: Not on file    Highest education level: Not on file   Social Needs    Financial resource strain: Not on file    Food insecurity - worry: Not on file    Food insecurity - inability: Not on file    Transportation needs - medical: Not on file    Transportation needs - non-medical:  "Not on file   Occupational History    Occupation: [employer]     Employer: [employer]   Tobacco Use    Smoking status: Current Some Day Smoker     Packs/day: 0.50     Years: 15.00     Pack years: 7.50     Types: Cigarettes     Start date: 4/14/1995     Last attempt to quit: 10/20/2015     Years since quitting: 3.2    Smokeless tobacco: Never Used   Substance and Sexual Activity    Alcohol use: Yes     Alcohol/week: 0.0 oz     Comment: occasional use    Drug use: Not on file    Sexual activity: Not on file   Other Topics Concern    Not on file   Social History Narrative    Not on file       Past Surgical History:   Procedure Laterality Date    NASAL SEPTUM SURGERY      neck lipoma excision         Review of Systems   Constitutional: Negative for chills, fatigue and fever.   HENT: Negative for hearing loss.    Eyes: Negative for photophobia and visual disturbance.   Respiratory: Negative for cough, chest tightness, shortness of breath and wheezing.    Cardiovascular: Negative for chest pain and palpitations.   Gastrointestinal: Negative for constipation, diarrhea, nausea and vomiting.   Endocrine: Negative for cold intolerance and heat intolerance.   Genitourinary: Negative for flank pain.   Musculoskeletal: Positive for arthralgias and gait problem. Negative for neck pain and neck stiffness.   Skin: Negative for wound.   Neurological: Negative for light-headedness and headaches.   Psychiatric/Behavioral: Negative for sleep disturbance.          Objective:   BP (!) 141/88 (BP Location: Right arm, Patient Position: Sitting, BP Method: Large (Automatic))   Pulse 101   Resp 16   Ht 5' 11" (1.803 m)   Wt 109.9 kg (242 lb 4.6 oz)   BMI 33.79 kg/m²     X-Ray Foot Complete Left  Narrative: EXAMINATION:  XR FOOT COMPLETE 3 VIEW LEFT    CLINICAL HISTORY:  .  Pain in left foot    TECHNIQUE:  AP, lateral and oblique views of the left foot were performed.    COMPARISON:  05/25/2016    FINDINGS:  There is no " radiographic evidence of acute osseous, articular, or soft tissue abnormality.  Joint spaces are well preserved.  No erosive changes demonstrated. Small dorsal surface calcaneal enthesophyte noted.  No appreciable change from prior.  Impression: As Above.  No acute findings.    Electronically signed by: Stevie Moreno MD  Date:    01/14/2019  Time:    16:12         LOWER EXTREMITY PHYSICAL EXAMINATION  VASCULAR: On the left foot, the dorsalis pedis pulse is 2/4 and the posterior tibial pulse is 2/4. Capillary refill time is less than 3 seconds. Hair growth is present on the dorsum of the foot and at the digits. No rubor is present. Proximal to distal temperature is warm to warm.    NEUROLOGY: Sensation to light touch is intact. Proprioception is intact. Sensation to pin prick is intact. Deep tendon reflexes of the lower extremity are WNL.     DERMATOLOGY: Skin is supple, dry and intact. No ecchymosis is noted. No hypertrophic skin formation. No erythema or cellulitis is noted. No calor is appreciated.    ORTHOPEDIC: Manual Muscle Testing is 5/5 in all planes on the left, without pains, with and without resistance. No pains to palpation of the medial or lateral ankle ligaments. No discomfort to palpation of the posterior tibial tendon, peroneal tendon, Achilles tendon or the anterior ankle tendons.  Slight discomfort with palpation of dorsal lateral aspect midfoot at the 4th and 3rd tarsometatarsal fixation.  No palpable underlying osteophytes noted.  No edema is noted. No crepitus or fluctuance.  Gait pattern is slightly antalgic without the walking boot.     Assessment:     1. Peroneal tendinitis, left    2. Os peroneum syndrome of left foot    3. Left foot pain    4. Sjogren's syndrome, with unspecified organ involvement    5. Undifferentiated connective tissue disease    6. Polyarticular arthritis        Plan:     Peroneal tendinitis, left  Os peroneum syndrome of left foot  Left foot pain  Resolved the  symptoms at this time.  Patient may discontinue walking boot.  Thorough discussion is had with the patient today, concerning the diagnosis, its etiology, and the treatment algorithm at present.  Patient may discontinue or NSAID.    Polyarticular arthritis  Left foot pain  Sjogren's syndrome, with unspecified organ involvement  Undifferentiated connective tissue disease  -     predniSONE (DELTASONE) 10 MG tablet; Take 3 tablets (30 mg total) by mouth once daily. for 5 days  Dispense: 15 tablet; Refill: 0    Thorough discussion is had with the patient today, concerning the diagnosis, its etiology, and the treatment algorithm at present.  XRAYS are reviewed in detail with the patient. All questions and concerns regarding findings and its/their implications are outlined and discussed.  Please discontinue oral NSAID.  I will transition patient to a short course Prednisone 30mg PO QD for 5 days.  Follow up as needed or in the event of symptomology.  Patient advised to follow up with Primary Care Provider and/or Rheumatologist for management of rheumatological pathology.          No future appointments.

## 2019-03-09 ENCOUNTER — HOSPITAL ENCOUNTER (OUTPATIENT)
Dept: RADIOLOGY | Facility: HOSPITAL | Age: 44
Discharge: HOME | End: 2019-03-09
Attending: NURSE PRACTITIONER
Payer: COMMERCIAL

## 2019-03-09 ENCOUNTER — OFFICE VISIT (OUTPATIENT)
Dept: URGENT CARE | Facility: CLINIC | Age: 44
End: 2019-03-09
Payer: COMMERCIAL

## 2019-03-09 VITALS
HEIGHT: 71 IN | SYSTOLIC BLOOD PRESSURE: 132 MMHG | HEART RATE: 94 BPM | DIASTOLIC BLOOD PRESSURE: 88 MMHG | BODY MASS INDEX: 34.19 KG/M2 | OXYGEN SATURATION: 94 % | WEIGHT: 244.25 LBS | TEMPERATURE: 98 F

## 2019-03-09 DIAGNOSIS — M25.551 HIP PAIN, BILATERAL: Primary | ICD-10-CM

## 2019-03-09 DIAGNOSIS — M25.552 HIP PAIN, BILATERAL: Primary | ICD-10-CM

## 2019-03-09 DIAGNOSIS — M35.9 UNDIFFERENTIATED CONNECTIVE TISSUE DISEASE: ICD-10-CM

## 2019-03-09 DIAGNOSIS — J30.1 CHRONIC SEASONAL ALLERGIC RHINITIS DUE TO POLLEN: ICD-10-CM

## 2019-03-09 DIAGNOSIS — M25.552 HIP PAIN, BILATERAL: ICD-10-CM

## 2019-03-09 DIAGNOSIS — M25.551 HIP PAIN, BILATERAL: ICD-10-CM

## 2019-03-09 PROCEDURE — 3008F BODY MASS INDEX DOCD: CPT | Mod: CPTII,S$GLB,, | Performed by: NURSE PRACTITIONER

## 2019-03-09 PROCEDURE — 3008F PR BODY MASS INDEX (BMI) DOCUMENTED: ICD-10-PCS | Mod: CPTII,S$GLB,, | Performed by: NURSE PRACTITIONER

## 2019-03-09 PROCEDURE — 96372 THER/PROPH/DIAG INJ SC/IM: CPT | Mod: S$GLB,,, | Performed by: NURSE PRACTITIONER

## 2019-03-09 PROCEDURE — 73521 X-RAY EXAM HIPS BI 2 VIEWS: CPT | Mod: TC

## 2019-03-09 PROCEDURE — 99999 PR PBB SHADOW E&M-EST. PATIENT-LVL V: ICD-10-PCS | Mod: PBBFAC,,, | Performed by: NURSE PRACTITIONER

## 2019-03-09 PROCEDURE — 96372 PR INJECTION,THERAP/PROPH/DIAG2ST, IM OR SUBCUT: ICD-10-PCS | Mod: S$GLB,,, | Performed by: NURSE PRACTITIONER

## 2019-03-09 PROCEDURE — 73521 XR HIPS BILATERAL 2 VIEW INCL AP PELVIS: ICD-10-PCS | Mod: 26,,, | Performed by: RADIOLOGY

## 2019-03-09 PROCEDURE — 3079F PR MOST RECENT DIASTOLIC BLOOD PRESSURE 80-89 MM HG: ICD-10-PCS | Mod: CPTII,S$GLB,, | Performed by: NURSE PRACTITIONER

## 2019-03-09 PROCEDURE — 99999 PR PBB SHADOW E&M-EST. PATIENT-LVL V: CPT | Mod: PBBFAC,,, | Performed by: NURSE PRACTITIONER

## 2019-03-09 PROCEDURE — 3079F DIAST BP 80-89 MM HG: CPT | Mod: CPTII,S$GLB,, | Performed by: NURSE PRACTITIONER

## 2019-03-09 PROCEDURE — 73521 X-RAY EXAM HIPS BI 2 VIEWS: CPT | Mod: 26,,, | Performed by: RADIOLOGY

## 2019-03-09 PROCEDURE — 99214 PR OFFICE/OUTPT VISIT, EST, LEVL IV, 30-39 MIN: ICD-10-PCS | Mod: 25,S$GLB,, | Performed by: NURSE PRACTITIONER

## 2019-03-09 PROCEDURE — 3075F PR MOST RECENT SYSTOLIC BLOOD PRESS GE 130-139MM HG: ICD-10-PCS | Mod: CPTII,S$GLB,, | Performed by: NURSE PRACTITIONER

## 2019-03-09 PROCEDURE — 3075F SYST BP GE 130 - 139MM HG: CPT | Mod: CPTII,S$GLB,, | Performed by: NURSE PRACTITIONER

## 2019-03-09 PROCEDURE — 99214 OFFICE O/P EST MOD 30 MIN: CPT | Mod: 25,S$GLB,, | Performed by: NURSE PRACTITIONER

## 2019-03-09 RX ORDER — METHYLPREDNISOLONE 4 MG/1
TABLET ORAL
Qty: 1 PACKAGE | Refills: 0 | Status: SHIPPED | OUTPATIENT
Start: 2019-03-09 | End: 2019-03-24 | Stop reason: CLARIF

## 2019-03-09 RX ORDER — METHOCARBAMOL 500 MG/1
1000 TABLET, FILM COATED ORAL 3 TIMES DAILY PRN
Qty: 80 TABLET | Refills: 0 | Status: SHIPPED | OUTPATIENT
Start: 2019-03-09 | End: 2019-03-09

## 2019-03-09 RX ORDER — TRAMADOL HYDROCHLORIDE 50 MG/1
50 TABLET ORAL EVERY 8 HOURS PRN
Qty: 10 TABLET | Refills: 0 | Status: SHIPPED | OUTPATIENT
Start: 2019-03-09 | End: 2019-03-12

## 2019-03-09 RX ORDER — BETAMETHASONE SODIUM PHOSPHATE AND BETAMETHASONE ACETATE 3; 3 MG/ML; MG/ML
6 INJECTION, SUSPENSION INTRA-ARTICULAR; INTRALESIONAL; INTRAMUSCULAR; SOFT TISSUE
Status: COMPLETED | OUTPATIENT
Start: 2019-03-09 | End: 2019-03-09

## 2019-03-09 RX ORDER — FLUTICASONE PROPIONATE 50 MCG
2 SPRAY, SUSPENSION (ML) NASAL DAILY
Qty: 16 G | Refills: 6 | Status: SHIPPED | OUTPATIENT
Start: 2019-03-09 | End: 2020-03-27 | Stop reason: SDUPTHER

## 2019-03-09 RX ORDER — METHOCARBAMOL 500 MG/1
1000 TABLET, FILM COATED ORAL 3 TIMES DAILY PRN
Qty: 30 TABLET | Refills: 0 | Status: SHIPPED | OUTPATIENT
Start: 2019-03-09 | End: 2019-03-16

## 2019-03-09 RX ADMIN — BETAMETHASONE SODIUM PHOSPHATE AND BETAMETHASONE ACETATE 6 MG: 3; 3 INJECTION, SUSPENSION INTRA-ARTICULAR; INTRALESIONAL; INTRAMUSCULAR; SOFT TISSUE at 02:03

## 2019-03-09 NOTE — PROGRESS NOTES
"Subjective:      Patient ID: Yoandy Forde is a 43 y.o. male.    Chief Complaint: Hip Pain (bilateral) and Allergies    Mr. Forde presents to Urgent Care today with complaints of bilateral hip pain x 2-3 days. States that he had some moderate pain starting the day after [place] Gr[place] 3/6/19. He does admit to doing a lot of walking during Jaiden Gras. No trauma or heavy lifting. No fever or chills. No rash, redness, or swelling of the joints. He tried going to the gym yesterday to see if walking on the treadmill would provide any relief, but symptoms worsened later that night. He had so much discomfort last night that he wasn't able to sleep. Taking oral NSAIDs and Tylenol with no improvement. Describes the pain as a constant, deep, aching pain that is non-radiating and most intense on the lateral part of the hips. He also describes a "strain" to the anterolateral hips. No back pain, incontinence, saddle anesthesia, or other focal neuro deficits. He has a history of connective tissue disease, states that he is overdue to see rheumatology. Compliant with all medications.     Also requesting refill on Flonase. Has been having sneezing and sinus congestion for a few days that worsens after being outside.       Review of Systems   Constitutional: Negative.  Negative for chills and fever.   HENT: Positive for sinus pressure and sneezing. Negative for sore throat.    Eyes: Negative.    Respiratory: Negative.    Cardiovascular: Negative.    Gastrointestinal: Negative.    Genitourinary: Negative.  Negative for decreased urine volume, dysuria, flank pain, frequency and urgency.   Musculoskeletal:        See HPI   Skin: Negative.  Negative for color change and rash.   Neurological: Negative.  Negative for weakness and numbness.   Hematological: Negative.        Objective:   /88   Pulse 94   Temp 97.5 °F (36.4 °C)   Ht 5' 11" (1.803 m)   Wt 110.8 kg (244 lb 4.3 oz)   SpO2 (!) 94%   BMI 34.07 kg/m²   Physical Exam "   Constitutional: He is oriented to person, place, and time. He appears well-developed and well-nourished. No distress.   HENT:   Head: Normocephalic and atraumatic.   Nose: Nose normal.   Mouth/Throat: Oropharynx is clear and moist.   Eyes: Conjunctivae are normal.   Neck: Normal range of motion. Neck supple.   Cardiovascular: Normal rate, regular rhythm, normal heart sounds and intact distal pulses.   Pulmonary/Chest: Effort normal and breath sounds normal. No respiratory distress.   Musculoskeletal: Normal range of motion.        Right hip: He exhibits tenderness (lateral hip). He exhibits normal range of motion (normal ROM, but pain with active and passive abduction in all directions; no swelling, rash, or erythema).        Left hip: He exhibits tenderness (lateral hip). He exhibits normal range of motion (normal ROM, but pain with active and passive abduction in all directions; no swelling, rash, or erythem) and normal strength.        Lumbar back: Normal. He exhibits normal range of motion, no tenderness and no bony tenderness.   Neurological: He is alert and oriented to person, place, and time.   Skin: Skin is warm and dry. No rash noted. He is not diaphoretic.   Nursing note and vitals reviewed.    Assessment:      1. Hip pain, bilateral    2. Undifferentiated connective tissue disease    3. Chronic seasonal allergic rhinitis due to pollen       Plan:   Hip pain, bilateral  -     X-Ray Hips Bilateral 2 View Incl AP Pelvis; Future; Expected date: 03/09/2019  -     betamethasone acetate-betamethasone sodium phosphate injection 6 mg  -     methylPREDNISolone (MEDROL DOSEPACK) 4 mg tablet; use as directed  Dispense: 1 Package; Refill: 0  -     traMADol (ULTRAM) 50 mg tablet; Take 1 tablet (50 mg total) by mouth every 8 (eight) hours as needed (severe pain).  Dispense: 10 tablet; Refill: 0  -     methocarbamol (ROBAXIN) 500 MG Tab; Take 2 tablets (1,000 mg total) by mouth 3 (three) times daily as needed (muscle  spasms).  Dispense: 30 tablet; Refill: 0    Undifferentiated connective tissue disease    Chronic seasonal allergic rhinitis due to pollen  -     fluticasone (FLONASE) 50 mcg/actuation nasal spray; 2 sprays (100 mcg total) by Each Nare route once daily.  Dispense: 16 g; Refill: 6    Follow up with rheumatology or PCP for recheck. Overdue to see rheum, so will have nursing staff schedule an appointment.   ER for worsening pain or fever.   Rest. Ice packs 3-5 times daily for 15-20 minutes at a time.   Instructions, follow up, and supportive care as per AVS.

## 2019-03-09 NOTE — PATIENT INSTRUCTIONS
Hip Strain    You have a strain of the muscles around the hip joint. A muscle strain is a stretching or tearing of muscle fibers. This causes pain, especially when you move that muscle. There may also be some swelling and bruising.  Home care  · Stay off the injured leg as much as possible until you can walk on it without pain. If you have a lot of pain with walking, crutches or a walker may be prescribed. These can be rented or purchased at many pharmacies and surgical or orthopedic supply stores. Follow your healthcare provider's advice regarding when to begin putting weight on that leg.  · Apply an ice pack over the injured area for 15 to 20 minutes every 3 to 6 hours. You should do this for the first 24 to 48 hours. You can make an ice pack by filling a plastic bag that seals at the top with ice cubes and then wrapping it with a thin towel. Be careful not to injure your skin with the ice treatments. Ice should never be applied directly to skin. Continue the use of ice packs for relief of pain and swelling as needed. After 48 hours, apply heat (warm shower or warm bath) for 15 to 20 minutes several times a day, or alternate ice and heat.  · You may use over-the-counter pain medicine to control pain, unless another pain medicine was prescribed. If you have chronic liver or kidney disease or ever had a stomach ulcer or GI bleeding, talk with your healthcare provider beforeusing these medicines.  · If you play sports, you may resume these activities when you are able to hop and run on the injured leg without pain.  Follow-up care  Follow up with your healthcare provider, or as advised. If your symptoms do not begin to get better after a week, more tests may be needed.  If X-rays were taken, you will be told of any new findings that may affect your care.  When to seek medical advice  Call your healthcare provider right away if any of these occur:  · Increased swelling or bruising  · Increased pain  · Losing the  ability to put weight on the injured side  Date Last Reviewed: 11/19/2015  © 9348-8039 [employer] [address] [place] [address], [place] [address]. All rights reserved. This information is not intended as a substitute for professional medical care. Always follow your healthcare professional's instructions.

## 2019-03-11 ENCOUNTER — LAB VISIT (OUTPATIENT)
Dept: LAB | Facility: HOSPITAL | Age: 44
End: 2019-03-11
Attending: INTERNAL MEDICINE
Payer: COMMERCIAL

## 2019-03-11 ENCOUNTER — OFFICE VISIT (OUTPATIENT)
Dept: RHEUMATOLOGY | Facility: CLINIC | Age: 44
End: 2019-03-11
Payer: COMMERCIAL

## 2019-03-11 VITALS
HEART RATE: 86 BPM | SYSTOLIC BLOOD PRESSURE: 138 MMHG | BODY MASS INDEX: 34.19 KG/M2 | DIASTOLIC BLOOD PRESSURE: 87 MMHG | WEIGHT: 244.25 LBS | HEIGHT: 71 IN

## 2019-03-11 DIAGNOSIS — L93.2 CUTANEOUS LUPUS ERYTHEMATOSUS: ICD-10-CM

## 2019-03-11 DIAGNOSIS — M35.01 SJOGREN'S SYNDROME WITH KERATOCONJUNCTIVITIS SICCA: ICD-10-CM

## 2019-03-11 DIAGNOSIS — L93.2 CUTANEOUS LUPUS ERYTHEMATOSUS: Primary | ICD-10-CM

## 2019-03-11 DIAGNOSIS — Z72.0 TOBACCO ABUSE: ICD-10-CM

## 2019-03-11 DIAGNOSIS — M35.9 UNDIFFERENTIATED CONNECTIVE TISSUE DISEASE: ICD-10-CM

## 2019-03-11 LAB
ALBUMIN SERPL BCP-MCNC: 4.6 G/DL
ALP SERPL-CCNC: 52 U/L
ALT SERPL W/O P-5'-P-CCNC: 22 U/L
ANION GAP SERPL CALC-SCNC: 12 MMOL/L
AST SERPL-CCNC: 25 U/L
BASOPHILS # BLD AUTO: 0.05 K/UL
BASOPHILS NFR BLD: 0.4 %
BILIRUB SERPL-MCNC: 0.3 MG/DL
BUN SERPL-MCNC: 26 MG/DL
CALCIUM SERPL-MCNC: 10 MG/DL
CHLORIDE SERPL-SCNC: 107 MMOL/L
CO2 SERPL-SCNC: 23 MMOL/L
CREAT SERPL-MCNC: 1.3 MG/DL
CRP SERPL-MCNC: 1.1 MG/L
DIFFERENTIAL METHOD: NORMAL
EOSINOPHIL # BLD AUTO: 0.1 K/UL
EOSINOPHIL NFR BLD: 0.7 %
ERYTHROCYTE [DISTWIDTH] IN BLOOD BY AUTOMATED COUNT: 13.7 %
ERYTHROCYTE [SEDIMENTATION RATE] IN BLOOD BY WESTERGREN METHOD: 4 MM/HR
EST. GFR  (AFRICAN AMERICAN): >60 ML/MIN/1.73 M^2
EST. GFR  (NON AFRICAN AMERICAN): >60 ML/MIN/1.73 M^2
GLUCOSE SERPL-MCNC: 102 MG/DL
HCT VFR BLD AUTO: 44.8 %
HGB BLD-MCNC: 14.7 G/DL
IMM GRANULOCYTES # BLD AUTO: 0.04 K/UL
IMM GRANULOCYTES NFR BLD AUTO: 0.3 %
LYMPHOCYTES # BLD AUTO: 3.3 K/UL
LYMPHOCYTES NFR BLD: 28.3 %
MCH RBC QN AUTO: 29.8 PG
MCHC RBC AUTO-ENTMCNC: 32.8 G/DL
MCV RBC AUTO: 91 FL
MONOCYTES # BLD AUTO: 1 K/UL
MONOCYTES NFR BLD: 8.4 %
NEUTROPHILS # BLD AUTO: 7.2 K/UL
NEUTROPHILS NFR BLD: 62.2 %
NRBC BLD-RTO: 0 /100 WBC
PLATELET # BLD AUTO: 295 K/UL
PLATELET BLD QL SMEAR: NORMAL
PMV BLD AUTO: 10.3 FL
POTASSIUM SERPL-SCNC: 4.1 MMOL/L
PROT SERPL-MCNC: 8 G/DL
RBC # BLD AUTO: 4.94 M/UL
SODIUM SERPL-SCNC: 142 MMOL/L
WBC # BLD AUTO: 11.65 K/UL

## 2019-03-11 PROCEDURE — 36415 COLL VENOUS BLD VENIPUNCTURE: CPT

## 2019-03-11 PROCEDURE — 3079F DIAST BP 80-89 MM HG: CPT | Mod: CPTII,S$GLB,, | Performed by: INTERNAL MEDICINE

## 2019-03-11 PROCEDURE — 86160 COMPLEMENT ANTIGEN: CPT

## 2019-03-11 PROCEDURE — 86038 ANTINUCLEAR ANTIBODIES: CPT

## 2019-03-11 PROCEDURE — 99999 PR PBB SHADOW E&M-EST. PATIENT-LVL III: ICD-10-PCS | Mod: PBBFAC,,, | Performed by: INTERNAL MEDICINE

## 2019-03-11 PROCEDURE — 3075F PR MOST RECENT SYSTOLIC BLOOD PRESS GE 130-139MM HG: ICD-10-PCS | Mod: CPTII,S$GLB,, | Performed by: INTERNAL MEDICINE

## 2019-03-11 PROCEDURE — 86140 C-REACTIVE PROTEIN: CPT

## 2019-03-11 PROCEDURE — 80053 COMPREHEN METABOLIC PANEL: CPT

## 2019-03-11 PROCEDURE — 86160 COMPLEMENT ANTIGEN: CPT | Mod: 59

## 2019-03-11 PROCEDURE — 3079F PR MOST RECENT DIASTOLIC BLOOD PRESSURE 80-89 MM HG: ICD-10-PCS | Mod: CPTII,S$GLB,, | Performed by: INTERNAL MEDICINE

## 2019-03-11 PROCEDURE — 85652 RBC SED RATE AUTOMATED: CPT

## 2019-03-11 PROCEDURE — 3008F PR BODY MASS INDEX (BMI) DOCUMENTED: ICD-10-PCS | Mod: CPTII,S$GLB,, | Performed by: INTERNAL MEDICINE

## 2019-03-11 PROCEDURE — 99214 PR OFFICE/OUTPT VISIT, EST, LEVL IV, 30-39 MIN: ICD-10-PCS | Mod: S$GLB,,, | Performed by: INTERNAL MEDICINE

## 2019-03-11 PROCEDURE — 99214 OFFICE O/P EST MOD 30 MIN: CPT | Mod: S$GLB,,, | Performed by: INTERNAL MEDICINE

## 2019-03-11 PROCEDURE — 85025 COMPLETE CBC W/AUTO DIFF WBC: CPT

## 2019-03-11 PROCEDURE — 3075F SYST BP GE 130 - 139MM HG: CPT | Mod: CPTII,S$GLB,, | Performed by: INTERNAL MEDICINE

## 2019-03-11 PROCEDURE — 3008F BODY MASS INDEX DOCD: CPT | Mod: CPTII,S$GLB,, | Performed by: INTERNAL MEDICINE

## 2019-03-11 PROCEDURE — 99999 PR PBB SHADOW E&M-EST. PATIENT-LVL III: CPT | Mod: PBBFAC,,, | Performed by: INTERNAL MEDICINE

## 2019-03-11 RX ORDER — HYDROXYCHLOROQUINE SULFATE 200 MG/1
200 TABLET, FILM COATED ORAL 2 TIMES DAILY
Qty: 60 TABLET | Refills: 4 | Status: SHIPPED | OUTPATIENT
Start: 2019-03-11 | End: 2020-01-31

## 2019-03-11 NOTE — PROGRESS NOTES
CC:  Chief Complaint   Patient presents with    Disease Management    Pain   UCTD / sjogrens     History of Present Illness:  Yoandy Staples a 43 y.o.yo male   Patient Active Problem List   Diagnosis    Dermatophytosis of other specified sites    Undifferentiated connective tissue disease    Essential hypertension    Hemorrhoids    GERD (gastroesophageal reflux disease)    Sjogren's syndrome    Medication monitoring encounter    Tobacco abuse    Vitamin D deficiency    Obesity (BMI 30.0-34.9)     Here for routine f/u   First time seeing  He was seen by Dr. Walter in the past   He has a past medical history of cutaneous lupus and  UCTD/ Sjogren syndrome  He had a positive DONG 1 : 160 nucleolar with positive SSA in 2012  He had a biopsy of the lesion in the scalp which was suggestive of cutaneous lupus  History of dry eyes and dry mouth which is stable  Uses Restasis for dry eyes  He is on Plaquenil 200 mg p.o. b.i.d. since then, has not had an eye exam last year  (Advised he needs annual eye exams while on Plaquenil)      He also has a history of vitiligo predominantly involving chin area  He has used a chemical dye and after that he developed this depigmentation even before 2012  Later he developed lesions on the scalp and ears,  in the back  This was biopsied suggestive of possible cutaneous lupus on review  His more recent DONG have all been negative and he had normal complements and inflammatory markers  He has old lesions involving scalp and both ears, healed and no further progression noted  No new rashes    More recently he over did during mardi Gras and developed bilateral hip pain  He was seen in urgent care and was given a Medrol Dosepak  Pain resolved, he is completing the course  Occasionally his left knee bothers him otherwise he denies any joint swelling  He may notice some hand pains at times  No other issues  Continues to smoke    Past Medical History:   Diagnosis Date    Hyperglycemia      Hypertension     MCTD (mixed connective tissue disease)     Dr Branham     Vitiligo        Past Surgical History:   Procedure Laterality Date    NASAL SEPTUM SURGERY      neck lipoma excision           Social History     Tobacco Use    Smoking status: Current Some Day Smoker     Packs/day: 0.50     Years: 15.00     Pack years: 7.50     Types: Cigarettes     Start date: 4/14/1995     Last attempt to quit: 10/20/2015     Years since quitting: 3.3    Smokeless tobacco: Never Used   Substance Use Topics    Alcohol use: Yes     Alcohol/week: 0.0 oz     Comment: occasional use    Drug use: Not on file       Family History   Problem Relation Age of Onset    Diabetes Mother     Hypertension Mother     Stroke Father     Hypertension Father     Asthma Brother     Heart disease Sister     Aneurysm Sister        Review of patient's allergies indicates:   Allergen Reactions    Sulfa (sulfonamide antibiotics) Hives     Other reaction(s): Unknown    Sulfamethoxazole-trimethoprim Hives             Review of Systems:  Constitutional: Denies fever, chills. No recent weight changes.   Fatigue: no  Muscle weakness: no  Headaches: no new headaches  Eyes: No redness + dryness.  No recent visual changes.  ENT: + dry mouth. No oral or nasal ulcers.  Card: No chest pain.  Resp: No cough or sob.   Gastro: No nausea or vomiting.  No heartburn.  Constipation: no  Diarrhea: no  Genito:  No dysuria.  No genital ulcers.  Skin: No rash.  Raynauds:no  Neuro: No numbness / tingling.   Psych: No depression, anxiety  Endo:  no excess thirst.  Heme: no abnormal bleeding or bruising  Clots:none       OBJECTIVE:     Vital Signs   Vitals:    03/11/19 1350   BP: 138/87   Pulse: 86     Physical Exam:  General Appearance:  NAD.   Gait: not favoring.  HEENT: PERRL.  Eyes not dry or injected.  No nasal ulcers.  Mouth not dry, no oral lesions.  Lymph: cervical, supraclavicular or axillary nodes: none abnormal   Cardio: no irregularity of S1  or S2.  No gallops or rubs.   Resp: Normal respiratory motion. Clear to auscultation bilaterally.   No abnormal chest conformation.  Abd: Soft, non-tender, nondistended.  No masses.   Skin: Head and neck,  and extremities examined.   Depigmented lesion over chin  Small deep pigmented areas noted over scalp and  in both ears  Neuro: Ox3.   Cranial nerves II-XII grossly intact.   Sensation intact  in both distal LE and upper extremities to light touch.  Musculoskeletal Exam:    Right Side Rheumatological Exam     Examination finds the shoulder, elbow, wrist, knee, 1st PIP, 1st MCP, 2nd PIP, 2nd MCP, 3rd PIP, 3rd MCP, 4th PIP, 4th MCP, 5th PIP and 5th MCP no synovitis     Others :  Hip: No synovitis   Ankle :No synovitis   Foot:No synovitis     Left Side Rheumatological Exam     Examination finds the shoulder, elbow, wrist, knee, 1st PIP, 1st MCP, 2nd PIP, 2nd MCP, 3rd PIP, 3rd MCP, 4th PIP, 4th MCP, 5th PIP and 5th MCP no synovitis     Others :  Hip:No synovitis   Ankle :No synovitis   Foot:No synovitis       Muscle strength:Equal and full in all mm groups of the upper and lower ext.    Laboratory:   Results for orders placed or performed in visit on 11/20/18   CBC auto differential   Result Value Ref Range    WBC 5.11 3.90 - 12.70 K/uL    RBC 4.94 4.60 - 6.20 M/uL    Hemoglobin 14.8 14.0 - 18.0 g/dL    Hematocrit 44.6 40.0 - 54.0 %    MCV 90 82 - 98 fL    MCH 30.0 27.0 - 31.0 pg    MCHC 33.2 32.0 - 36.0 g/dL    RDW 13.4 11.5 - 14.5 %    Platelets 220 150 - 350 K/uL    MPV 10.6 9.2 - 12.9 fL    Immature Granulocytes 0.4 0.0 - 0.5 %    Gran # (ANC) 1.9 1.8 - 7.7 K/uL    Immature Grans (Abs) 0.02 0.00 - 0.04 K/uL    Lymph # 2.3 1.0 - 4.8 K/uL    Mono # 0.6 0.3 - 1.0 K/uL    Eos # 0.3 0.0 - 0.5 K/uL    Baso # 0.05 0.00 - 0.20 K/uL    nRBC 0 0 /100 WBC    Gran% 37.4 (L) 38.0 - 73.0 %    Lymph% 44.4 18.0 - 48.0 %    Mono% 11.5 4.0 - 15.0 %    Eosinophil% 5.3 0.0 - 8.0 %    Basophil% 1.0 0.0 - 1.9 %    Differential Method  Automated    Comprehensive metabolic panel   Result Value Ref Range    Sodium 138 136 - 145 mmol/L    Potassium 4.2 3.5 - 5.1 mmol/L    Chloride 106 95 - 110 mmol/L    CO2 26 23 - 29 mmol/L    Glucose 92 70 - 110 mg/dL    BUN, Bld 17 6 - 20 mg/dL    Creatinine 1.2 0.5 - 1.4 mg/dL    Calcium 9.3 8.7 - 10.5 mg/dL    Total Protein 7.1 6.0 - 8.4 g/dL    Albumin 4.0 3.5 - 5.2 g/dL    Total Bilirubin 0.5 0.1 - 1.0 mg/dL    Alkaline Phosphatase 49 (L) 55 - 135 U/L    AST 25 10 - 40 U/L    ALT 21 10 - 44 U/L    Anion Gap 6 (L) 8 - 16 mmol/L    eGFR if African American >60.0 >60 mL/min/1.73 m^2    eGFR if non African American >60.0 >60 mL/min/1.73 m^2   Lipid panel   Result Value Ref Range    Cholesterol 156 120 - 199 mg/dL    Triglycerides 86 30 - 150 mg/dL    HDL 38 (L) 40 - 75 mg/dL    LDL Cholesterol 100.8 63.0 - 159.0 mg/dL    HDL/Chol Ratio 24.4 20.0 - 50.0 %    Total Cholesterol/HDL Ratio 4.1 2.0 - 5.0    Non-HDL Cholesterol 118 mg/dL   TSH   Result Value Ref Range    TSH 1.961 0.400 - 4.000 uIU/mL   Vitamin D   Result Value Ref Range    Vit D, 25-Hydroxy 20 (L) 30 - 96 ng/mL   PSA, Screening   Result Value Ref Range    PSA, SCREEN 0.37 0.00 - 4.00 ng/mL     Imaging :FINDINGS:  No acute fracture or dislocation.  Hip joint spaces maintained with symmetric superior acetabular over coverage noted.  Degenerative findings of the lower lumbar spine present.  No concerning soft tissue abnormality.    Notes reviewed  Other procedures:    ASSESSMENT/PLAN:     Cutaneous lupus erythematosus  -     C-reactive protein; Standing  -     Sedimentation rate; Standing  -     C4 complement; Standing; Expected date: 03/11/2019  -     C3 complement; Standing; Expected date: 03/11/2019  -     Urinalysis; Standing  -     Protein / creatinine ratio, urine; Standing  -     Comprehensive metabolic panel; Standing  -     CBC auto differential; Standing  -     DONG Screen w/Reflex; Standing  -     hydroxychloroquine (PLAQUENIL) 200 mg tablet;  Take 1 tablet (200 mg total) by mouth 2 (two) times daily.  Dispense: 60 tablet; Refill: 4    Sjogren's syndrome with keratoconjunctivitis sicca    Undifferentiated connective tissue disease    Tobacco abuse      1:  UCTD- cutaneous lupus by history, + skin biopsy/ Sjogren syndrome  No active skin lesions  Reviewed old biopsy results from [facility]  Stable  Continue Restasis for dry eyes  Stable labs, normal complements, inflammatory markers, UA  Continue with Plaquenil 200 b.i.d.  Schedule appointment with ophthalmology for annual eye checkups- call for appointment  Quit smoking    Exercise, healthy lifestyle    6 month return    Risks vs Benefits and potential side effects of medication prescribed today were discussed with patient. Medication literature given to patient up discharge  Went over uptodate information /literature on the meds prescribed today      Disclaimer: This note was prepared using voice recognition system and is likely to have sound alike errors and is not proof read.  Please call me with any questions.

## 2019-03-12 LAB
ANA SER QL IF: NORMAL
C3 SERPL-MCNC: 144 MG/DL
C4 SERPL-MCNC: 34 MG/DL

## 2019-03-18 ENCOUNTER — OFFICE VISIT (OUTPATIENT)
Dept: OPHTHALMOLOGY | Facility: CLINIC | Age: 44
End: 2019-03-18
Payer: COMMERCIAL

## 2019-03-18 DIAGNOSIS — M35.00 SJOGREN'S SYNDROME, WITH UNSPECIFIED ORGAN INVOLVEMENT: ICD-10-CM

## 2019-03-18 DIAGNOSIS — M35.9 UNDIFFERENTIATED CONNECTIVE TISSUE DISEASE: Primary | ICD-10-CM

## 2019-03-18 DIAGNOSIS — Z79.899 LONG-TERM USE OF PLAQUENIL: ICD-10-CM

## 2019-03-18 DIAGNOSIS — H52.4 PRESBYOPIA: ICD-10-CM

## 2019-03-18 DIAGNOSIS — H04.123 DRY EYES, BILATERAL: ICD-10-CM

## 2019-03-18 PROCEDURE — 92015 PR REFRACTION: ICD-10-PCS | Mod: S$GLB,,, | Performed by: OPTOMETRIST

## 2019-03-18 PROCEDURE — 92014 COMPRE OPH EXAM EST PT 1/>: CPT | Mod: S$GLB,,, | Performed by: OPTOMETRIST

## 2019-03-18 PROCEDURE — 99999 PR PBB SHADOW E&M-EST. PATIENT-LVL I: ICD-10-PCS | Mod: PBBFAC,,, | Performed by: OPTOMETRIST

## 2019-03-18 PROCEDURE — 99999 PR PBB SHADOW E&M-EST. PATIENT-LVL I: CPT | Mod: PBBFAC,,, | Performed by: OPTOMETRIST

## 2019-03-18 PROCEDURE — 92014 PR EYE EXAM, EST PATIENT,COMPREHESV: ICD-10-PCS | Mod: S$GLB,,, | Performed by: OPTOMETRIST

## 2019-03-18 PROCEDURE — 92015 DETERMINE REFRACTIVE STATE: CPT | Mod: S$GLB,,, | Performed by: OPTOMETRIST

## 2019-03-18 NOTE — PROGRESS NOTES
HPI     Pts last exam was 7/13/17 with DNL. PT c/o blurred near va and wears no   correction.   Plaquenil use: 2010, 200 mg qd  Last 10-2 VF: 7/13/17  Last mOCT: 3/18/19  HPI    Any vision changes since last exam: decreased near va  Eye pain: no  Other ocular symptoms: dryness and redness OU, visine prn with some relief    Do you wear currently wear glasses or contacts? no    Interested in contacts today? no    Do you plan on getting new glasses today? If needed        Last edited by Maria Teresa Jimenez MA on 3/18/2019  3:55 PM. (History)            Assessment /Plan     For exam results, see Encounter Report.    Undifferentiated connective tissue disease  Sjogren's syndrome, with unspecified organ involvement  Long-term use of Plaquenil  Dry eyes, bilateral  Dry eye doing well, no spk present today  Pt complains of occasional redness  Recommended d/c Visine, use Lumify PRN for redness    Presbyopia  Eyeglass Final Rx     Eyeglass Final Rx       Sphere    Right +1.00    Left +1.25    Type:  SVL    Expiration Date:  3/18/2020    Comments:  Computer/near              Discussed PAL, pt prefers SV reading PRN    No maculopathy present today.   Stressed importance of follow up visits with pt.  RTC 6 months for dilation,10-2 VF, and mOCT.   PRN sooner if any va changes.

## 2019-03-24 ENCOUNTER — HOSPITAL ENCOUNTER (EMERGENCY)
Facility: HOSPITAL | Age: 44
Discharge: HOME | End: 2019-03-24
Attending: EMERGENCY MEDICINE
Payer: COMMERCIAL

## 2019-03-24 VITALS
OXYGEN SATURATION: 98 % | TEMPERATURE: 98 F | DIASTOLIC BLOOD PRESSURE: 81 MMHG | HEART RATE: 89 BPM | WEIGHT: 242.31 LBS | HEIGHT: 71 IN | RESPIRATION RATE: 18 BRPM | SYSTOLIC BLOOD PRESSURE: 149 MMHG | BODY MASS INDEX: 33.92 KG/M2

## 2019-03-24 DIAGNOSIS — K08.89 DENTALGIA: Primary | ICD-10-CM

## 2019-03-24 DIAGNOSIS — I10 ELEVATED BLOOD PRESSURE READING WITH DIAGNOSIS OF HYPERTENSION: ICD-10-CM

## 2019-03-24 DIAGNOSIS — F17.200 CURRENT SMOKER: ICD-10-CM

## 2019-03-24 DIAGNOSIS — K02.9 DENTAL CARIES: ICD-10-CM

## 2019-03-24 PROCEDURE — 99284 EMERGENCY DEPT VISIT MOD MDM: CPT

## 2019-03-24 RX ORDER — PENICILLIN V POTASSIUM 500 MG/1
500 TABLET, FILM COATED ORAL 4 TIMES DAILY
Qty: 28 TABLET | Refills: 0 | Status: SHIPPED | OUTPATIENT
Start: 2019-03-24 | End: 2019-03-31

## 2019-03-24 RX ORDER — METHYLPREDNISOLONE 4 MG/1
TABLET ORAL
Qty: 1 PACKAGE | Refills: 0 | Status: SHIPPED | OUTPATIENT
Start: 2019-03-24 | End: 2019-09-30

## 2019-03-24 NOTE — ED PROVIDER NOTES
SCRIBE #1 NOTE: I, Sabino Pena, am scribing for, and in the presence of, YVETTE Albarran. I have scribed the entire note.       History     Chief Complaint   Patient presents with    Oral Pain     patient states he ate crabs last night and has a pain in his throat and tongue, denies difficulty breathing or swallowing      Review of patient's allergies indicates:   Allergen Reactions    Sulfa (sulfonamide antibiotics) Hives     Other reaction(s): Unknown    Sulfamethoxazole-trimethoprim Hives         History of Present Illness     HPI    3/24/2019, 4:58 PM  History obtained from the patient      History of Present Illness: Yoandy Forde is a 43 y.o. male patient with a PMHx of hyperglycemia, HTN, MCTD, virtiligo who presents to the Emergency Department for evaluation of dental pain which onset gradually last night after eating crabs. Pt states he had a similar reaction of pain when eating shrimp. Symptoms are constant and moderate in severity. No mitigating or exacerbating factors reported. Associated sxs include pain with swallowing, R side facial swelling.  Patient states that pain is worse when chewing food.  Patient reports that he went to dentist about a month ago and was told that he had several cavities that needed work.   Patient denies any drooling, SOB, wheezing, sinus pain/pressure, fever, chills, n/v/d, and all other sxs at this time. No further complaints or concerns at this time.       Arrival mode: Personal vehicle     PCP: Mary Feliciano MD        Past Medical History:  Past Medical History:   Diagnosis Date    Hyperglycemia     Hypertension     Lupus erythematosus     MCTD (mixed connective tissue disease)     Dr Branham     Vitiligo        Past Surgical History:  Past Surgical History:   Procedure Laterality Date    NASAL SEPTUM SURGERY      neck lipoma excision           Family History:  Family History   Problem Relation Age of Onset    Diabetes Mother     Hypertension Mother     Stroke  Father     Hypertension Father     Asthma Brother     Heart disease Sister     Aneurysm Sister        Social History:  Social History     Tobacco Use    Smoking status: Current Some Day Smoker     Packs/day: 0.50     Years: 15.00     Pack years: 7.50     Types: Cigarettes     Start date: 4/14/1995     Last attempt to quit: 10/20/2015     Years since quitting: 3.4    Smokeless tobacco: Never Used   Substance and Sexual Activity    Alcohol use: Yes     Alcohol/week: 0.0 oz     Comment: occasional use    Drug use: Unknown    Sexual activity: Unknown        Review of Systems     Review of Systems   Constitutional: Negative for chills, fatigue and fever.   HENT: Positive for dental problem, facial swelling and trouble swallowing. Negative for drooling, ear pain, mouth sores, rhinorrhea, sinus pressure, sinus pain, sneezing, sore throat and voice change.    Eyes: Negative for photophobia, pain and visual disturbance.   Respiratory: Negative for shortness of breath and wheezing.    Cardiovascular: Negative for chest pain.   Gastrointestinal: Negative for abdominal pain, diarrhea, nausea and vomiting.   Genitourinary: Negative for dysuria, flank pain, frequency and urgency.   Musculoskeletal: Negative for back pain, neck pain and neck stiffness.   Skin: Negative for rash.   Allergic/Immunologic: Negative for food allergies.   Neurological: Negative for dizziness, weakness, light-headedness, numbness and headaches.   Hematological: Does not bruise/bleed easily.   All other systems reviewed and are negative.     Physical Exam     Initial Vitals [03/24/19 1652]   BP Pulse Resp Temp SpO2   (!) 149/81 89 18 98.1 °F (36.7 °C) 98 %      MAP       --          Physical Exam  Nursing Notes and Vital Signs Reviewed.  Constitutional: Patient is in no acute distress. Well-developed and well-nourished.  Head: Atraumatic. Normocephalic.  Eyes: PERRL. EOM intact. Conjunctivae are not pale. No scleral icterus.  ENT: Mucous  "membranes are moist. Oropharynx is clear and symmetric.  Uvula midline.  Neck: Supple. Full ROM. No lymphadenopathy.  Mouth/Throat: No evident facial swelling. Patient handles secretions normally. Positive for dental caries. negative for gingival edema. No palpable fluctuance. No evidence of periodontal or periapical abscess. No trismus.   Cardiovascular: Regular rate. Regular rhythm. No murmurs, rubs, or gallops. Distal pulses are 2+ and symmetric.  Pulmonary/Chest: No respiratory distress. Clear to auscultation bilaterally. No wheezing or rales.  Abdominal: Soft and non-distended.  There is no tenderness.  No rebound, guarding, or rigidity.   Musculoskeletal: Moves all extremities. No obvious deformities. No edema. No calf tenderness.  Skin: Warm and dry.  Neurological:  Alert, awake, and appropriate.  Normal speech.  No acute focal neurological deficits are appreciated.  Psychiatric: Normal affect. Good eye contact. Appropriate in content.     ED Course   Procedures  ED Vital Signs:  Vitals:    03/24/19 1652   BP: (!) 149/81   Pulse: 89   Resp: 18   Temp: 98.1 °F (36.7 °C)   TempSrc: Oral   SpO2: 98%   Weight: 109.9 kg (242 lb 4.6 oz)   Height: 5' 11" (1.803 m)            The Emergency Provider reviewed the vital signs and test results, which are outlined above.     ED Discussion     5:17 PM: Assessed pt at this time. Discussed with pt all pertinent ED information and results. Discussed pt dx and plan of tx. Gave pt all f/u and return to the ED instructions. All questions and concerns were addressed at this time. Pt expresses understanding of information and instructions, and is comfortable with plan to discharge. Pt is stable for discharge.    I discussed with patient and/or family/caretaker that evaluation in the ED does not suggest any emergent or life threatening medical conditions requiring immediate intervention beyond what was provided in the ED, and I believe patient is safe for discharge.  Regardless, an " unremarkable evaluation in the ED does not preclude the development or presence of a serious of life threatening condition. As such, patient was instructed to return immediately for any worsening or change in current symptoms.    Patient is safe for discharge. There is no suggestion of airway or ENT emergency. Patient is hemodynamically stable and there is no suggestion of active anaphylaxis or progressive worsening of current symptoms.        Discharge Medication List as of 3/24/2019  5:16 PM      START taking these medications    Details   methylPREDNISolone (MEDROL DOSEPACK) 4 mg tablet use as directed, Normal      penicillin v potassium (VEETID) 500 MG tablet Take 1 tablet (500 mg total) by mouth 4 (four) times daily. for 7 days, Starting Sun 3/24/2019, Until Sun 3/31/2019, Normal             Follow-up Information     Mary Feliciano MD. Schedule an appointment as soon as possible for a visit in 3 days.    Specialty:  Family Medicine  Contact information:  [address]  [place] [address]  [phone]             Dentist.    Contact information:  Schedule an appointment ASAP for cavity repair                           Additional MDM:   Hypertension: The patient has hypertension (no treatment required at this time). The patient's condition was felt to be stable.   Smoking Cessation: The patient is a smoker. The patient was counseled on smoking cessation for: 3 minutes. The patient was counseled on tobacco related  health complications.          Scribe Attestation:   Scribe #1: I performed the above scribed service and the documentation accurately describes the services I performed. I attest to the accuracy of the note.     Attending:   Physician Attestation Statement for Scribe #1: I, YVETTE Albarran, personally performed the services described in this documentation, as scribed by Sabino Pena, in my presence, and it is both accurate and complete.           Clinical Impression       ICD-10-CM ICD-9-CM    1. Dentalgia K08.89 525.9   2. Dental caries K02.9 521.00   3. Elevated blood pressure reading with diagnosis of hypertension I10 401.9   4. Current smoker F17.200 305.1       Disposition:   Disposition: Discharged  Condition: Stable         Lynne Chowdhury PA-C  03/24/19 2633

## 2019-03-25 ENCOUNTER — TELEPHONE (OUTPATIENT)
Dept: RHEUMATOLOGY | Facility: CLINIC | Age: 44
End: 2019-03-25

## 2019-03-25 NOTE — TELEPHONE ENCOUNTER
----- Message from Ethan Shaffer sent at 3/25/2019  8:05 AM CDT -----  Contact: self [phone]  .Type:  Needs Medical Advice    Who Called: Yoandy Forde  Symptoms (please be specific): Swollen lymph nodes/weakness/   How long has patient had these symptoms:  Past 3 days  Pharmacy name and phone #:  ..  [facility] [id number] - [place] - [address] AT [facility]  [address] [address]  Phone: [phone] Fax: [phone]        Would the patient rather a call back or a response via [facility]? Call back  Best Call Back Number: [phone]  Additional Information: Pt states he would like to consult with nurse regarding ER visit. Please call back at [phone]

## 2019-03-25 NOTE — TELEPHONE ENCOUNTER
Called patient regarding request to speak with nurse about recent visit to the emergency room. Patient stated that he was having swollen lymph nodes and weakness. States that the emergency room prescribed him penicillin and steroids due to an allergic reaction to shellfish. He also c/o cavities and issues with his teeth and asked about making an appointment with the dentist. I advised him to continue taking the medications they gave to him as prescribed and to make his appointment with the dentist. Patient also inquired about urinalysis results, I let him know that they came back normal. Patient verbalized understanding.

## 2019-03-26 ENCOUNTER — TELEPHONE (OUTPATIENT)
Dept: RHEUMATOLOGY | Facility: CLINIC | Age: 44
End: 2019-03-26

## 2019-03-26 NOTE — TELEPHONE ENCOUNTER
Called patient about meds. Advised patient per doctor he can start taking  Prednisone pack. Patient verbalized understanding.

## 2019-03-26 NOTE — TELEPHONE ENCOUNTER
----- Message from Ethan Shaffer sent at 3/26/2019 12:09 PM CDT -----  Contact: self [phone]  .Type:  Needs Medical Advice    Who Called: Yoandy Forde  Symptoms (please be specific): swollen face from tooth pain   How long has patient had these symptoms:  1 week  Pharm[facility] name and phone #:  .    [facility] - [place] - [address] AT [facility] [address]  [address] [address]  Phone: [phone] Fax: [phone]    Would the patient rather a call back or a response via [facility]? Call back  Best Call Back Number: [phone]  Additional Information: Pt states he is currently taking Penicillin and would like to know if it is safe for him to take Methylprednisolone pack.

## 2019-03-28 ENCOUNTER — NURSE TRIAGE (OUTPATIENT)
Dept: ADMINISTRATIVE | Facility: CLINIC | Age: 44
End: 2019-03-28

## 2019-03-28 NOTE — TELEPHONE ENCOUNTER
Reason for Disposition   Caller has medication question about med not prescribed by PCP and triager unable to answer question (e.g., compatibility with other med, storage)    Protocols used: MEDICATION QUESTION CALL-A-IRAM Pitt states he was prescribed hydroxyzine for itching for a rash behind his left knee. He wasn't sure if it would interact with any of the new medications he is taking. Advised he should verify with pharmacist but the interactions  used states there is a potential risk of interaction between the plaquenil he is taking and the hydroxyzine. Advised if he can avoid taking it, that may be the safest thing to do since the medication is prn. He states he is not going to take the hydroxyzine.

## 2019-04-16 ENCOUNTER — TELEPHONE (OUTPATIENT)
Dept: INTERNAL MEDICINE | Facility: CLINIC | Age: 44
End: 2019-04-16

## 2019-04-16 NOTE — TELEPHONE ENCOUNTER
----- Message from Clementina Bailon sent at 4/15/2019  1:29 PM CDT -----  Contact: Pt  Pt would like nurse to contact regarding a certain medication he's currently taking. Pt asked that nurse please return call regarding matter at ([phone])

## 2019-04-16 NOTE — TELEPHONE ENCOUNTER
Spoke with pt . Pt advised to try otc zyrtec or claritin with flonase nasal spray as needed for allergy relief. Pt acknowledged understanding and was thankful for the all back.

## 2019-04-22 DIAGNOSIS — E55.9 VITAMIN D DEFICIENCY: ICD-10-CM

## 2019-04-22 RX ORDER — ERGOCALCIFEROL 1.25 MG/1
CAPSULE ORAL
Qty: 10 CAPSULE | Refills: 0 | Status: SHIPPED | OUTPATIENT
Start: 2019-04-22 | End: 2019-09-02 | Stop reason: SDUPTHER

## 2019-05-08 RX ORDER — LISINOPRIL AND HYDROCHLOROTHIAZIDE 12.5; 2 MG/1; MG/1
TABLET ORAL
Qty: 90 TABLET | Refills: 0 | Status: SHIPPED | OUTPATIENT
Start: 2019-05-08 | End: 2019-08-20 | Stop reason: SDUPTHER

## 2019-07-05 RX ORDER — FENOFIBRATE 145 MG/1
TABLET, FILM COATED ORAL
Qty: 90 TABLET | Refills: 0 | Status: SHIPPED | OUTPATIENT
Start: 2019-07-05 | End: 2019-09-30 | Stop reason: SDUPTHER

## 2019-08-20 RX ORDER — LISINOPRIL AND HYDROCHLOROTHIAZIDE 12.5; 2 MG/1; MG/1
TABLET ORAL
Qty: 90 TABLET | Refills: 0 | Status: SHIPPED | OUTPATIENT
Start: 2019-08-20 | End: 2019-12-01 | Stop reason: SDUPTHER

## 2019-09-02 DIAGNOSIS — E55.9 VITAMIN D DEFICIENCY: ICD-10-CM

## 2019-09-02 RX ORDER — ERGOCALCIFEROL 1.25 MG/1
CAPSULE ORAL
Qty: 10 CAPSULE | Refills: 0 | Status: SHIPPED | OUTPATIENT
Start: 2019-09-02 | End: 2019-11-01 | Stop reason: SDUPTHER

## 2019-09-16 ENCOUNTER — PATIENT OUTREACH (OUTPATIENT)
Dept: ADMINISTRATIVE | Facility: HOSPITAL | Age: 44
End: 2019-09-16

## 2019-09-30 ENCOUNTER — OFFICE VISIT (OUTPATIENT)
Dept: INTERNAL MEDICINE | Facility: CLINIC | Age: 44
End: 2019-09-30
Payer: COMMERCIAL

## 2019-09-30 ENCOUNTER — LAB VISIT (OUTPATIENT)
Dept: LAB | Facility: HOSPITAL | Age: 44
End: 2019-09-30
Attending: INTERNAL MEDICINE
Payer: COMMERCIAL

## 2019-09-30 ENCOUNTER — OFFICE VISIT (OUTPATIENT)
Dept: RHEUMATOLOGY | Facility: CLINIC | Age: 44
End: 2019-09-30
Payer: COMMERCIAL

## 2019-09-30 VITALS
WEIGHT: 240.75 LBS | DIASTOLIC BLOOD PRESSURE: 77 MMHG | SYSTOLIC BLOOD PRESSURE: 117 MMHG | BODY MASS INDEX: 33.58 KG/M2 | HEART RATE: 94 BPM

## 2019-09-30 VITALS
WEIGHT: 240.5 LBS | TEMPERATURE: 99 F | SYSTOLIC BLOOD PRESSURE: 122 MMHG | RESPIRATION RATE: 16 BRPM | HEART RATE: 93 BPM | HEIGHT: 71 IN | BODY MASS INDEX: 33.67 KG/M2 | DIASTOLIC BLOOD PRESSURE: 86 MMHG | OXYGEN SATURATION: 96 %

## 2019-09-30 DIAGNOSIS — E78.2 MIXED HYPERLIPIDEMIA: ICD-10-CM

## 2019-09-30 DIAGNOSIS — L93.2 CUTANEOUS LUPUS ERYTHEMATOSUS: ICD-10-CM

## 2019-09-30 DIAGNOSIS — J06.9 URTI (ACUTE UPPER RESPIRATORY INFECTION): ICD-10-CM

## 2019-09-30 DIAGNOSIS — K21.9 GASTROESOPHAGEAL REFLUX DISEASE, ESOPHAGITIS PRESENCE NOT SPECIFIED: ICD-10-CM

## 2019-09-30 DIAGNOSIS — M35.00 SJOGREN'S SYNDROME, WITH UNSPECIFIED ORGAN INVOLVEMENT: ICD-10-CM

## 2019-09-30 DIAGNOSIS — M35.9 UNDIFFERENTIATED CONNECTIVE TISSUE DISEASE: ICD-10-CM

## 2019-09-30 DIAGNOSIS — Z72.0 TOBACCO ABUSE: ICD-10-CM

## 2019-09-30 DIAGNOSIS — E55.9 VITAMIN D DEFICIENCY: ICD-10-CM

## 2019-09-30 DIAGNOSIS — I10 ESSENTIAL HYPERTENSION: ICD-10-CM

## 2019-09-30 DIAGNOSIS — Z51.81 MEDICATION MONITORING ENCOUNTER: ICD-10-CM

## 2019-09-30 DIAGNOSIS — Z00.00 ROUTINE GENERAL MEDICAL EXAMINATION AT A HEALTH CARE FACILITY: Primary | ICD-10-CM

## 2019-09-30 DIAGNOSIS — L93.2 CUTANEOUS LUPUS ERYTHEMATOSUS: Primary | ICD-10-CM

## 2019-09-30 DIAGNOSIS — E66.9 OBESITY (BMI 30.0-34.9): ICD-10-CM

## 2019-09-30 LAB
BILIRUB UR QL STRIP: NEGATIVE
C3 SERPL-MCNC: 153 MG/DL (ref 50–180)
C4 SERPL-MCNC: 40 MG/DL (ref 11–44)
CLARITY UR: CLEAR
COLOR UR: YELLOW
ERYTHROCYTE [SEDIMENTATION RATE] IN BLOOD BY WESTERGREN METHOD: 3 MM/HR (ref 0–23)
GLUCOSE UR QL STRIP: NEGATIVE
HGB UR QL STRIP: NEGATIVE
KETONES UR QL STRIP: NEGATIVE
LEUKOCYTE ESTERASE UR QL STRIP: NEGATIVE
NITRITE UR QL STRIP: NEGATIVE
PH UR STRIP: 6 [PH] (ref 5–8)
PROT UR QL STRIP: NEGATIVE
SP GR UR STRIP: >=1.03 (ref 1–1.03)
URN SPEC COLLECT METH UR: ABNORMAL

## 2019-09-30 PROCEDURE — 3079F DIAST BP 80-89 MM HG: CPT | Mod: CPTII,S$GLB,, | Performed by: FAMILY MEDICINE

## 2019-09-30 PROCEDURE — 99999 PR PBB SHADOW E&M-EST. PATIENT-LVL III: ICD-10-PCS | Mod: PBBFAC,,, | Performed by: INTERNAL MEDICINE

## 2019-09-30 PROCEDURE — 81003 URINALYSIS AUTO W/O SCOPE: CPT

## 2019-09-30 PROCEDURE — 3078F DIAST BP <80 MM HG: CPT | Mod: CPTII,S$GLB,, | Performed by: INTERNAL MEDICINE

## 2019-09-30 PROCEDURE — 3074F PR MOST RECENT SYSTOLIC BLOOD PRESSURE < 130 MM HG: ICD-10-PCS | Mod: CPTII,S$GLB,, | Performed by: FAMILY MEDICINE

## 2019-09-30 PROCEDURE — 3008F PR BODY MASS INDEX (BMI) DOCUMENTED: ICD-10-PCS | Mod: CPTII,S$GLB,, | Performed by: INTERNAL MEDICINE

## 2019-09-30 PROCEDURE — 85652 RBC SED RATE AUTOMATED: CPT

## 2019-09-30 PROCEDURE — 99396 PREV VISIT EST AGE 40-64: CPT | Mod: S$GLB,,, | Performed by: FAMILY MEDICINE

## 2019-09-30 PROCEDURE — 3008F BODY MASS INDEX DOCD: CPT | Mod: CPTII,S$GLB,, | Performed by: INTERNAL MEDICINE

## 2019-09-30 PROCEDURE — 86140 C-REACTIVE PROTEIN: CPT

## 2019-09-30 PROCEDURE — 3074F SYST BP LT 130 MM HG: CPT | Mod: CPTII,S$GLB,, | Performed by: INTERNAL MEDICINE

## 2019-09-30 PROCEDURE — 3079F PR MOST RECENT DIASTOLIC BLOOD PRESSURE 80-89 MM HG: ICD-10-PCS | Mod: CPTII,S$GLB,, | Performed by: FAMILY MEDICINE

## 2019-09-30 PROCEDURE — 86160 COMPLEMENT ANTIGEN: CPT | Mod: 59

## 2019-09-30 PROCEDURE — 99999 PR PBB SHADOW E&M-EST. PATIENT-LVL IV: CPT | Mod: PBBFAC,,, | Performed by: FAMILY MEDICINE

## 2019-09-30 PROCEDURE — 3074F SYST BP LT 130 MM HG: CPT | Mod: CPTII,S$GLB,, | Performed by: FAMILY MEDICINE

## 2019-09-30 PROCEDURE — 99999 PR PBB SHADOW E&M-EST. PATIENT-LVL III: CPT | Mod: PBBFAC,,, | Performed by: INTERNAL MEDICINE

## 2019-09-30 PROCEDURE — 3078F PR MOST RECENT DIASTOLIC BLOOD PRESSURE < 80 MM HG: ICD-10-PCS | Mod: CPTII,S$GLB,, | Performed by: INTERNAL MEDICINE

## 2019-09-30 PROCEDURE — 99214 PR OFFICE/OUTPT VISIT, EST, LEVL IV, 30-39 MIN: ICD-10-PCS | Mod: S$GLB,,, | Performed by: INTERNAL MEDICINE

## 2019-09-30 PROCEDURE — 3074F PR MOST RECENT SYSTOLIC BLOOD PRESSURE < 130 MM HG: ICD-10-PCS | Mod: CPTII,S$GLB,, | Performed by: INTERNAL MEDICINE

## 2019-09-30 PROCEDURE — 99214 OFFICE O/P EST MOD 30 MIN: CPT | Mod: S$GLB,,, | Performed by: INTERNAL MEDICINE

## 2019-09-30 PROCEDURE — 36415 COLL VENOUS BLD VENIPUNCTURE: CPT

## 2019-09-30 PROCEDURE — 99999 PR PBB SHADOW E&M-EST. PATIENT-LVL IV: ICD-10-PCS | Mod: PBBFAC,,, | Performed by: FAMILY MEDICINE

## 2019-09-30 PROCEDURE — 86160 COMPLEMENT ANTIGEN: CPT

## 2019-09-30 PROCEDURE — 99396 PR PREVENTIVE VISIT,EST,40-64: ICD-10-PCS | Mod: S$GLB,,, | Performed by: FAMILY MEDICINE

## 2019-09-30 RX ORDER — FENOFIBRATE 145 MG/1
145 TABLET, FILM COATED ORAL DAILY
Qty: 90 TABLET | Refills: 3 | Status: SHIPPED | OUTPATIENT
Start: 2019-09-30 | End: 2020-11-07 | Stop reason: SDUPTHER

## 2019-09-30 RX ORDER — PREDNISONE 20 MG/1
TABLET ORAL
Refills: 0 | COMMUNITY
Start: 2019-09-26 | End: 2019-10-28

## 2019-09-30 RX ORDER — AZITHROMYCIN 500 MG/1
TABLET, FILM COATED ORAL
Refills: 0 | COMMUNITY
Start: 2019-09-26 | End: 2020-02-04

## 2019-09-30 RX ORDER — CETIRIZINE HYDROCHLORIDE 10 MG/1
TABLET ORAL
Refills: 0 | COMMUNITY
Start: 2019-09-26 | End: 2020-02-04

## 2019-09-30 RX ORDER — PROMETHAZINE HYDROCHLORIDE AND DEXTROMETHORPHAN HYDROBROMIDE 6.25; 15 MG/5ML; MG/5ML
SYRUP ORAL
Refills: 0 | COMMUNITY
Start: 2019-09-26 | End: 2019-09-30

## 2019-09-30 RX ORDER — KETOROLAC TROMETHAMINE 10 MG/1
TABLET, FILM COATED ORAL
Refills: 0 | COMMUNITY
Start: 2019-09-16 | End: 2019-09-30

## 2019-09-30 NOTE — PROGRESS NOTES
"Subjective:       Patient ID: Yoandy Forde is a 44 y.o. male.    Chief Complaint: Follow-up and Medication Refill    44-year-old  male patient with Patient Active Problem List:     Dermatophytosis of other specified sites     Undifferentiated connective tissue disease     Essential hypertension     Hemorrhoids     GERD (gastroesophageal reflux disease)     Sjogren's syndrome     Medication monitoring encounter     Tobacco abuse     Vitamin D deficiency     Obesity (BMI 30.0-34.9)  Here for routine annual physicals and requesting refill on Tricor today.  Patient reported that he has been having upper respiratory symptoms with loss of voice last week for which he went to patient care +urgent care and was prescribed prednisone azithromycin and zyrtec, Has been taking medications as prescribed, reports that his voices been gradually coming back  Denies any fever with chills nausea vomiting, congestion  Denies any headache or vision disturbances, chest pain or difficulty breathing  Occasionally has been having joint pains but not significantly and has appointment with Rheumatology      Review of Systems   Constitutional: Negative for appetite change and fatigue.   HENT: Positive for postnasal drip and voice change. Negative for congestion, sore throat and trouble swallowing.    Eyes: Negative for visual disturbance.   Respiratory: Negative for cough and shortness of breath.    Cardiovascular: Negative for chest pain, palpitations and leg swelling.   Gastrointestinal: Negative for abdominal pain, nausea and vomiting.   Musculoskeletal: Negative for myalgias.   Skin: Negative for rash.   Neurological: Negative for headaches.   Psychiatric/Behavioral: Negative for sleep disturbance.         /86 (BP Location: Right arm, Patient Position: Sitting, BP Method: Large (Manual))   Pulse 93   Temp 98.5 °F (36.9 °C) (Tympanic)   Resp 16   Ht 5' 11" (1.803 m)   Wt 109.1 kg (240 lb 8.4 oz)   SpO2 96%   BMI " 33.55 kg/m²   Objective:      Physical Exam   Constitutional: He is oriented to person, place, and time. He appears well-developed and well-nourished.   HENT:   Head: Normocephalic and atraumatic.   Right Ear: External ear normal.   Left Ear: External ear normal.   Mouth/Throat: Oropharynx is clear and moist. No oropharyngeal exudate.   Postnasal drip noted on exam today   Neck: Neck supple.   Cardiovascular: Normal rate, regular rhythm and normal heart sounds.   No murmur heard.  Pulmonary/Chest: Effort normal and breath sounds normal. He has no wheezes.   Abdominal: Soft. Bowel sounds are normal. There is no tenderness.   Musculoskeletal: He exhibits no edema.   Lymphadenopathy:     He has no cervical adenopathy.   Neurological: He is alert and oriented to person, place, and time.   Skin: Skin is warm and dry. No rash noted.   Noted vitiligo   Psychiatric: He has a normal mood and affect.         Assessment/Plan:   1. Routine general medical examination at a health care facility  - CBC auto differential; Future  - Comprehensive metabolic panel; Future  - Lipid panel; Future  - TSH; Future  - Urinalysis; Future  - PSA, Screening; Future  Vital signs stable today  Clinical exam stable  Continue lifestyle modifications with low-fat and low-cholesterol diet and exercise 30 min daily  Advised to consider getting flu shot if interested at outside pharmacy  Will plan to get fasting labs in 2-3 days after finishing medication for upper respiratory tract infection    2. Essential hypertension  - Comprehensive metabolic panel; Future  - Lipid panel; Future  - TSH; Future  - Urinalysis; Future  Blood pressure is stable today currently on  Lisinopril hydrochlorothiazide 20/12.5 mg daily    3. Mixed hyperlipidemia  - Lipid panel; Future  - fenofibrate (TRICOR) 145 MG tablet; Take 1 tablet (145 mg total) by mouth once daily.  Dispense: 90 tablet; Refill: 3  Currently taking fenofibrate 145 mg, refills given today    4. Vitamin  D deficiency  - Vitamin D; Future  Taking vitamin-D by prescription weekly    5. Gastroesophageal reflux disease, esophagitis presence not specified  Stable with diet changes    6. Undifferentiated connective tissue disease  7. Sjogren's syndrome, with unspecified organ involvement  Followed by Rheumatology currently on Plaquenil    8. Tobacco abuse  - Ambulatory referral to Smoking Cessation Program  Continues to smoke half pack of cigarettes daily.  Will refer to smoking cessation program    9. Obesity (BMI 30.0-34.9)  Lifestyle modifications recommended to lose weight with BMI 33    10. URTI (acute upper respiratory infection)  Patient was encouraged to finish antibiotics azithromycin and prednisone as prescribed  Saltwater gargles recommended

## 2019-09-30 NOTE — PROGRESS NOTES
CC:  Routine follow-up of Sjogren's and cutaneous lupus    History of Present Illness:  Yoandy Staples a 44 y.o.yo male   Patient Active Problem List   Diagnosis    Dermatophytosis of other specified sites    Undifferentiated connective tissue disease    Essential hypertension    Hemorrhoids    GERD (gastroesophageal reflux disease)    Sjogren's syndrome    Medication monitoring encounter    Tobacco abuse    Vitamin D deficiency    Obesity (BMI 30.0-34.9)     Here for routine f/u   He has a past medical history of cutaneous lupus and  UCTD/ Sjogren syndrome  He had a positive DONG 1 : 160 nucleolar with positive SSA in 2012  He had a biopsy of the lesion in the scalp which was suggestive of cutaneous lupus  History of dry eyes and dry mouth which is stable  Uses Restasis for dry eyes  He is on Plaquenil 200 mg p.o. b.i.d. since then  (Advised he needs annual eye exams while on Plaquenil)      He also has a history of vitiligo predominantly involving chin area  He has used a chemical dye and after that he developed this depigmentation even before 2012  Later he developed lesions on the scalp and ears,  in the back  This was biopsied suggestive of possible cutaneous lupus on review  His more recent DONG have all been negative and he had normal complements and inflammatory markers  He has old lesions involving scalp and both ears, healed and no further progression noted  No new rashes    Continues to smoke    Past Medical History:   Diagnosis Date    Hyperglycemia     Hypertension     Lupus erythematosus     MCTD (mixed connective tissue disease)     Dr Branham     Vitiligo        Past Surgical History:   Procedure Laterality Date    NASAL SEPTUM SURGERY      neck lipoma excision           Social History     Tobacco Use    Smoking status: Current Some Day Smoker     Packs/day: 0.50     Years: 15.00     Pack years: 7.50     Types: Cigarettes     Start date: 4/14/1995    Smokeless tobacco: Never Used    Substance Use Topics    Alcohol use: Yes     Alcohol/week: 0.0 standard drinks     Comment: occasional use    Drug use: Not on file       Family History   Problem Relation Age of Onset    Diabetes Mother     Hypertension Mother     Stroke Father     Hypertension Father     Asthma Brother     Heart disease Sister     Aneurysm Sister        Review of patient's allergies indicates:   Allergen Reactions    Sulfa (sulfonamide antibiotics) Hives     Other reaction(s): Unknown    Sulfamethoxazole-trimethoprim Hives             Review of Systems:  Constitutional: Denies fever, chills. No recent weight changes.   Fatigue: no  Muscle weakness: no  Headaches: no new headaches  Eyes: No redness + dryness.  No recent visual changes.  ENT: + dry mouth. No oral or nasal ulcers.  Card: No chest pain.  Resp: No cough or sob.   Gastro: No nausea or vomiting.  No heartburn.  Constipation: no  Diarrhea: no  Genito:  No dysuria.  No genital ulcers.  Skin: No rash.  Raynauds:no  Neuro: No numbness / tingling.   Psych: No depression, anxiety  Endo:  no excess thirst.  Heme: no abnormal bleeding or bruising  Clots:none       OBJECTIVE:     Vital Signs   Vitals:    09/30/19 1420   BP: 117/77   Pulse: 94     Physical Exam:  General Appearance:  NAD.   Gait: not favoring.  HEENT: PERRL.  Eyes not dry or injected.  No nasal ulcers.  Mouth not dry, no oral lesions.  Lymph: cervical, supraclavicular or axillary nodes: none abnormal   Cardio: no irregularity of S1 or S2.  No gallops or rubs.   Resp: Normal respiratory motion. Clear to auscultation bilaterally.   No abnormal chest conformation.  Abd: Soft, non-tender, nondistended.  No masses.   Skin: Head and neck,  and extremities examined.   Depigmented lesions inside his ears   No other active lesions noted  Neuro: Ox3.   Cranial nerves II-XII grossly intact.   Sensation intact  in both distal LE and upper extremities to light touch.  Musculoskeletal Exam:    Right Side  Rheumatological Exam     Examination finds the shoulder, elbow, wrist, knee, 1st PIP, 1st MCP, 2nd PIP, 2nd MCP, 3rd PIP, 3rd MCP, 4th PIP, 4th MCP, 5th PIP and 5th MCP no synovitis     Others :  Hip: No synovitis   Ankle :No synovitis   Foot:No synovitis     Left Side Rheumatological Exam     Examination finds the shoulder, elbow, wrist, knee, 1st PIP, 1st MCP, 2nd PIP, 2nd MCP, 3rd PIP, 3rd MCP, 4th PIP, 4th MCP, 5th PIP and 5th MCP no synovitis     Others :  Hip:No synovitis   Ankle :No synovitis   Foot:No synovitis       Muscle strength:Equal and full in all mm groups of the upper and lower ext.    Laboratory:   Results for orders placed or performed in visit on 03/11/19   C-reactive protein   Result Value Ref Range    CRP 1.1 0.0 - 8.2 mg/L   Sedimentation rate   Result Value Ref Range    Sed Rate 4 0 - 23 mm/Hr   C4 complement   Result Value Ref Range    Complement (C-4) 34 11 - 44 mg/dL   C3 complement   Result Value Ref Range    Complement (C-3) 144 50 - 180 mg/dL   Comprehensive metabolic panel   Result Value Ref Range    Sodium 142 136 - 145 mmol/L    Potassium 4.1 3.5 - 5.1 mmol/L    Chloride 107 95 - 110 mmol/L    CO2 23 23 - 29 mmol/L    Glucose 102 70 - 110 mg/dL    BUN, Bld 26 (H) 6 - 20 mg/dL    Creatinine 1.3 0.5 - 1.4 mg/dL    Calcium 10.0 8.7 - 10.5 mg/dL    Total Protein 8.0 6.0 - 8.4 g/dL    Albumin 4.6 3.5 - 5.2 g/dL    Total Bilirubin 0.3 0.1 - 1.0 mg/dL    Alkaline Phosphatase 52 (L) 55 - 135 U/L    AST 25 10 - 40 U/L    ALT 22 10 - 44 U/L    Anion Gap 12 8 - 16 mmol/L    eGFR if African American >60 >60 mL/min/1.73 m^2    eGFR if non African American >60 >60 mL/min/1.73 m^2   CBC auto differential   Result Value Ref Range    WBC 11.65 3.90 - 12.70 K/uL    RBC 4.94 4.60 - 6.20 M/uL    Hemoglobin 14.7 14.0 - 18.0 g/dL    Hematocrit 44.8 40.0 - 54.0 %    Mean Corpuscular Volume 91 82 - 98 fL    Mean Corpuscular Hemoglobin 29.8 27.0 - 31.0 pg    Mean Corpuscular Hemoglobin Conc 32.8 32.0 - 36.0  g/dL    RDW 13.7 11.5 - 14.5 %    Platelets 295 150 - 350 K/uL    MPV 10.3 9.2 - 12.9 fL    Immature Granulocytes 0.3 0.0 - 0.5 %    Gran # (ANC) 7.2 1.8 - 7.7 K/uL    Immature Grans (Abs) 0.04 0.00 - 0.04 K/uL    Lymph # 3.3 1.0 - 4.8 K/uL    Mono # 1.0 0.3 - 1.0 K/uL    Eos # 0.1 0.0 - 0.5 K/uL    Baso # 0.05 0.00 - 0.20 K/uL    nRBC 0 0 /100 WBC    Gran% 62.2 38.0 - 73.0 %    Lymph% 28.3 18.0 - 48.0 %    Mono% 8.4 4.0 - 15.0 %    Eosinophil% 0.7 0.0 - 8.0 %    Basophil% 0.4 0.0 - 1.9 %    Platelet Estimate Appears normal     Differential Method Automated    DONG Screen w/Reflex   Result Value Ref Range    DONG Screen Negative <1:160 Negative <1:160     Imaging :FINDINGS:  No acute fracture or dislocation.  Hip joint spaces maintained with symmetric superior acetabular over coverage noted.  Degenerative findings of the lower lumbar spine present.  No concerning soft tissue abnormality.    Notes reviewed  Other procedures:    ASSESSMENT/PLAN:     Cutaneous lupus erythematosus  -     C-reactive protein; Standing  -     Sedimentation rate; Standing  -     C4 complement; Standing; Expected date: 09/30/2019  -     C3 complement; Standing; Expected date: 09/30/2019  -     Urinalysis; Standing    Tobacco abuse    Medication monitoring encounter      1:  UCTD- cutaneous lupus by history, + skin biopsy/ Sjogren syndrome  No active skin lesions  Reviewed old biopsy results from [facility]  Stable  Continue Restasis for dry eyes  Stable labs, normal complements, inflammatory markers, UA, few months back  Continue with Plaquenil 200 b.i.d.  Schedule appointment with ophthalmology for annual eye checkups- call for appointment  Quit smoking    Exercise, healthy lifestyle  Labs today        6 month return    Risks vs Benefits and potential side effects of medication prescribed today were discussed with patient. Medication literature given to patient up discharge  Went over uptodate information /literature on the meds prescribed today     Plaquenil skin pigmentation always use sunscreens, ocular toxicity recommend annual eye checkups, rare myositis discussed    Disclaimer: This note was prepared using voice recognition system and is likely to have sound alike errors and is not proof read.  Please call me with any questions.

## 2019-10-01 LAB — CRP SERPL-MCNC: 1.8 MG/L (ref 0–8.2)

## 2019-10-05 ENCOUNTER — LAB VISIT (OUTPATIENT)
Dept: LAB | Facility: HOSPITAL | Age: 44
End: 2019-10-05
Attending: FAMILY MEDICINE
Payer: COMMERCIAL

## 2019-10-05 DIAGNOSIS — I10 ESSENTIAL HYPERTENSION: ICD-10-CM

## 2019-10-05 DIAGNOSIS — E55.9 VITAMIN D DEFICIENCY: ICD-10-CM

## 2019-10-05 DIAGNOSIS — Z00.00 ROUTINE GENERAL MEDICAL EXAMINATION AT A HEALTH CARE FACILITY: ICD-10-CM

## 2019-10-05 DIAGNOSIS — E78.2 MIXED HYPERLIPIDEMIA: ICD-10-CM

## 2019-10-05 LAB
25(OH)D3+25(OH)D2 SERPL-MCNC: 26 NG/ML (ref 30–96)
ALBUMIN SERPL BCP-MCNC: 3.9 G/DL (ref 3.5–5.2)
ALP SERPL-CCNC: 52 U/L (ref 55–135)
ALT SERPL W/O P-5'-P-CCNC: 18 U/L (ref 10–44)
ANION GAP SERPL CALC-SCNC: 6 MMOL/L (ref 8–16)
AST SERPL-CCNC: 24 U/L (ref 10–40)
BASOPHILS # BLD AUTO: 0.05 K/UL (ref 0–0.2)
BASOPHILS NFR BLD: 1 % (ref 0–1.9)
BILIRUB SERPL-MCNC: 0.6 MG/DL (ref 0.1–1)
BILIRUB UR QL STRIP: NEGATIVE
BUN SERPL-MCNC: 16 MG/DL (ref 6–20)
CALCIUM SERPL-MCNC: 8.8 MG/DL (ref 8.7–10.5)
CHLORIDE SERPL-SCNC: 107 MMOL/L (ref 95–110)
CHOLEST SERPL-MCNC: 130 MG/DL (ref 120–199)
CHOLEST/HDLC SERPL: 3.1 {RATIO} (ref 2–5)
CLARITY UR: CLEAR
CO2 SERPL-SCNC: 26 MMOL/L (ref 23–29)
COLOR UR: YELLOW
COMPLEXED PSA SERPL-MCNC: 0.37 NG/ML (ref 0–4)
CREAT SERPL-MCNC: 1.1 MG/DL (ref 0.5–1.4)
DIFFERENTIAL METHOD: ABNORMAL
EOSINOPHIL # BLD AUTO: 0.2 K/UL (ref 0–0.5)
EOSINOPHIL NFR BLD: 4.2 % (ref 0–8)
ERYTHROCYTE [DISTWIDTH] IN BLOOD BY AUTOMATED COUNT: 13.3 % (ref 11.5–14.5)
EST. GFR  (AFRICAN AMERICAN): >60 ML/MIN/1.73 M^2
EST. GFR  (NON AFRICAN AMERICAN): >60 ML/MIN/1.73 M^2
GLUCOSE SERPL-MCNC: 88 MG/DL (ref 70–110)
GLUCOSE UR QL STRIP: NEGATIVE
HCT VFR BLD AUTO: 44.6 % (ref 40–54)
HDLC SERPL-MCNC: 42 MG/DL (ref 40–75)
HDLC SERPL: 32.3 % (ref 20–50)
HGB BLD-MCNC: 14.4 G/DL (ref 14–18)
HGB UR QL STRIP: NEGATIVE
IMM GRANULOCYTES # BLD AUTO: 0.03 K/UL (ref 0–0.04)
IMM GRANULOCYTES NFR BLD AUTO: 0.6 % (ref 0–0.5)
KETONES UR QL STRIP: NEGATIVE
LDLC SERPL CALC-MCNC: 71 MG/DL (ref 63–159)
LEUKOCYTE ESTERASE UR QL STRIP: NEGATIVE
LYMPHOCYTES # BLD AUTO: 2.6 K/UL (ref 1–4.8)
LYMPHOCYTES NFR BLD: 48.9 % (ref 18–48)
MCH RBC QN AUTO: 29.9 PG (ref 27–31)
MCHC RBC AUTO-ENTMCNC: 32.3 G/DL (ref 32–36)
MCV RBC AUTO: 93 FL (ref 82–98)
MONOCYTES # BLD AUTO: 0.6 K/UL (ref 0.3–1)
MONOCYTES NFR BLD: 10.5 % (ref 4–15)
NEUTROPHILS # BLD AUTO: 1.8 K/UL (ref 1.8–7.7)
NEUTROPHILS NFR BLD: 34.8 % (ref 38–73)
NITRITE UR QL STRIP: NEGATIVE
NONHDLC SERPL-MCNC: 88 MG/DL
NRBC BLD-RTO: 0 /100 WBC
PH UR STRIP: 7 [PH] (ref 5–8)
PLATELET # BLD AUTO: 247 K/UL (ref 150–350)
PMV BLD AUTO: 10.8 FL (ref 9.2–12.9)
POTASSIUM SERPL-SCNC: 4.2 MMOL/L (ref 3.5–5.1)
PROT SERPL-MCNC: 6.6 G/DL (ref 6–8.4)
PROT UR QL STRIP: NEGATIVE
RBC # BLD AUTO: 4.82 M/UL (ref 4.6–6.2)
SODIUM SERPL-SCNC: 139 MMOL/L (ref 136–145)
SP GR UR STRIP: 1.01 (ref 1–1.03)
TRIGL SERPL-MCNC: 85 MG/DL (ref 30–150)
TSH SERPL DL<=0.005 MIU/L-ACNC: 1.53 UIU/ML (ref 0.4–4)
URN SPEC COLLECT METH UR: NORMAL
WBC # BLD AUTO: 5.26 K/UL (ref 3.9–12.7)

## 2019-10-05 PROCEDURE — 80061 LIPID PANEL: CPT

## 2019-10-05 PROCEDURE — 85025 COMPLETE CBC W/AUTO DIFF WBC: CPT

## 2019-10-05 PROCEDURE — 84443 ASSAY THYROID STIM HORMONE: CPT

## 2019-10-05 PROCEDURE — 36415 COLL VENOUS BLD VENIPUNCTURE: CPT

## 2019-10-05 PROCEDURE — 84153 ASSAY OF PSA TOTAL: CPT

## 2019-10-05 PROCEDURE — 80053 COMPREHEN METABOLIC PANEL: CPT

## 2019-10-05 PROCEDURE — 82306 VITAMIN D 25 HYDROXY: CPT

## 2019-10-05 PROCEDURE — 81003 URINALYSIS AUTO W/O SCOPE: CPT

## 2019-10-28 ENCOUNTER — HOSPITAL ENCOUNTER (OUTPATIENT)
Dept: RADIOLOGY | Facility: HOSPITAL | Age: 44
Discharge: HOME | End: 2019-10-28
Attending: PODIATRIST
Payer: COMMERCIAL

## 2019-10-28 ENCOUNTER — OFFICE VISIT (OUTPATIENT)
Dept: PODIATRY | Facility: CLINIC | Age: 44
End: 2019-10-28
Payer: COMMERCIAL

## 2019-10-28 VITALS
HEIGHT: 71 IN | HEART RATE: 86 BPM | WEIGHT: 244.5 LBS | BODY MASS INDEX: 34.23 KG/M2 | SYSTOLIC BLOOD PRESSURE: 133 MMHG | DIASTOLIC BLOOD PRESSURE: 82 MMHG

## 2019-10-28 DIAGNOSIS — M79.671 INFLAMMATORY HEEL PAIN, RIGHT: Primary | ICD-10-CM

## 2019-10-28 DIAGNOSIS — M79.671 INFLAMMATORY HEEL PAIN, RIGHT: ICD-10-CM

## 2019-10-28 DIAGNOSIS — M13.0 POLYARTICULAR ARTHRITIS: ICD-10-CM

## 2019-10-28 PROCEDURE — 3008F PR BODY MASS INDEX (BMI) DOCUMENTED: ICD-10-PCS | Mod: CPTII,S$GLB,, | Performed by: PODIATRIST

## 2019-10-28 PROCEDURE — 73650 X-RAY EXAM OF HEEL: CPT | Mod: TC,RT

## 2019-10-28 PROCEDURE — 3079F PR MOST RECENT DIASTOLIC BLOOD PRESSURE 80-89 MM HG: ICD-10-PCS | Mod: CPTII,S$GLB,, | Performed by: PODIATRIST

## 2019-10-28 PROCEDURE — 3008F BODY MASS INDEX DOCD: CPT | Mod: CPTII,S$GLB,, | Performed by: PODIATRIST

## 2019-10-28 PROCEDURE — 3075F SYST BP GE 130 - 139MM HG: CPT | Mod: CPTII,S$GLB,, | Performed by: PODIATRIST

## 2019-10-28 PROCEDURE — 99214 PR OFFICE/OUTPT VISIT, EST, LEVL IV, 30-39 MIN: ICD-10-PCS | Mod: S$GLB,,, | Performed by: PODIATRIST

## 2019-10-28 PROCEDURE — 73650 XR CALCANEUS 2 VIEW RIGHT: ICD-10-PCS | Mod: 26,RT,, | Performed by: RADIOLOGY

## 2019-10-28 PROCEDURE — 3075F PR MOST RECENT SYSTOLIC BLOOD PRESS GE 130-139MM HG: ICD-10-PCS | Mod: CPTII,S$GLB,, | Performed by: PODIATRIST

## 2019-10-28 PROCEDURE — 3079F DIAST BP 80-89 MM HG: CPT | Mod: CPTII,S$GLB,, | Performed by: PODIATRIST

## 2019-10-28 PROCEDURE — 99214 OFFICE O/P EST MOD 30 MIN: CPT | Mod: S$GLB,,, | Performed by: PODIATRIST

## 2019-10-28 PROCEDURE — 99999 PR PBB SHADOW E&M-EST. PATIENT-LVL III: CPT | Mod: PBBFAC,,, | Performed by: PODIATRIST

## 2019-10-28 PROCEDURE — 73650 X-RAY EXAM OF HEEL: CPT | Mod: 26,RT,, | Performed by: RADIOLOGY

## 2019-10-28 PROCEDURE — 99999 PR PBB SHADOW E&M-EST. PATIENT-LVL III: ICD-10-PCS | Mod: PBBFAC,,, | Performed by: PODIATRIST

## 2019-10-28 RX ORDER — METHYLPREDNISOLONE 4 MG/1
TABLET ORAL
Qty: 1 PACKAGE | Refills: 0 | Status: SHIPPED | OUTPATIENT
Start: 2019-10-28 | End: 2019-11-18

## 2019-10-28 NOTE — PROGRESS NOTES
PODIATRIC MEDICINE AND SURGERY      CHIEF COMPLAINT  Chief Complaint   Patient presents with    Foot Pain     Right foot pain, rates pain 9/10. Possible P.F.         HPI:    Yoandy Forde is a 44 y.o. male who complains of pain to the plantar lateral aspect of right heel.  Pain is described as achy.  Pain is worse in the morning and after periods of sitting down and then getting up and walking again.   Pain is rated to be 9/10 by the patient on a 1-10 scale.  Symptoms are intermittent however. Denies any recent exercise training, or repetitive use.   Pain has been present for several months.  Pain is worsening.    Hemoglobin A1C   Date Value Ref Range Status   11/02/2016 5.8 4.5 - 6.2 % Final     Comment:     According to ADA guidelines, hemoglobin A1C <7.0% represents  optimal control in non-pregnant diabetic patients.  Different  metrics may apply to specific populations.   Standards of Medical Care in Diabetes - 2016.  For the purpose of screening for the presence of diabetes:  <5.7%     Consistent with the absence of diabetes  5.7-6.4%  Consistent with increasing risk for diabetes   (prediabetes)  >or=6.5%  Consistent with diabetes  Currently no consensus exists for use of hemoglobin A1C  for diagnosis of diabetes for children.           PMH  Past Medical History:   Diagnosis Date    Hyperglycemia     Hypertension     Lupus erythematosus     MCTD (mixed connective tissue disease)     Dr Branham     Vitiligo         PSH  Past Surgical History:   Procedure Laterality Date    NASAL SEPTUM SURGERY      neck lipoma excision          MEDS  Current Outpatient Medications on File Prior to Visit   Medication Sig Dispense Refill    azithromycin (ZITHROMAX) 500 MG tablet TK 1 T PO QD FOR 5 DAYS  0    cetirizine (ZYRTEC) 10 MG tablet TK 1 T PO QD FOR 10 DAYS  0    diclofenac sodium (PENNSAID) 20 mg/gram /actuation(2 %) sopm Apply 2 Pump topically 2 (two) times daily. 1 Bottle 6    ergocalciferol (ERGOCALCIFEROL)  50,000 unit Cap TAKE 1 CAPSULE BY MOUTH EVERY 7 DAYS 10 capsule 0    fenofibrate (TRICOR) 145 MG tablet Take 1 tablet (145 mg total) by mouth once daily. 90 tablet 3    fluticasone (FLONASE) 50 mcg/actuation nasal spray 2 sprays (100 mcg total) by Each Nare route once daily. 16 g 6    hydroxychloroquine (PLAQUENIL) 200 mg tablet Take 1 tablet (200 mg total) by mouth 2 (two) times daily. 60 tablet 4    lisinopril-hydrochlorothiazide (PRINZIDE,ZESTORETIC) 20-12.5 mg per tablet TAKE 1 TABLET BY MOUTH EVERY DAY 90 tablet 0    [DISCONTINUED] predniSONE (DELTASONE) 20 MG tablet TK 1 T PO QD FOR 5 DAYS  0    betamethasone dipropionate (DIPROLENE) 0.05 % cream Apply topically 2 (two) times daily. for 10 days 15 g 0    cycloSPORINE (RESTASIS) 0.05 % ophthalmic emulsion Place 0.4 mLs (1 drop total) into both eyes 2 (two) times daily. 180 vial 4     Current Facility-Administered Medications on File Prior to Visit   Medication Dose Route Frequency Provider Last Rate Last Dose    0.9%  NaCl infusion   Intravenous 1 time in Clinic/HOD Casie Callahan PA-C          Medication List with Changes/Refills   New Medications    METHYLPREDNISOLONE (MEDROL DOSEPACK) 4 MG TABLET    use as directed   Current Medications    AZITHROMYCIN (ZITHROMAX) 500 MG TABLET    TK 1 T PO QD FOR 5 DAYS    BETAMETHASONE DIPROPIONATE (DIPROLENE) 0.05 % CREAM    Apply topically 2 (two) times daily. for 10 days    CETIRIZINE (ZYRTEC) 10 MG TABLET    TK 1 T PO QD FOR 10 DAYS    CYCLOSPORINE (RESTASIS) 0.05 % OPHTHALMIC EMULSION    Place 0.4 mLs (1 drop total) into both eyes 2 (two) times daily.    DICLOFENAC SODIUM (PENNSAID) 20 MG/GRAM /ACTUATION(2 %) SOPM    Apply 2 Pump topically 2 (two) times daily.    ERGOCALCIFEROL (ERGOCALCIFEROL) 50,000 UNIT CAP    TAKE 1 CAPSULE BY MOUTH EVERY 7 DAYS    FENOFIBRATE (TRICOR) 145 MG TABLET    Take 1 tablet (145 mg total) by mouth once daily.    FLUTICASONE (FLONASE) 50 MCG/ACTUATION NASAL SPRAY    2 sprays  (100 mcg total) by Each Nare route once daily.    HYDROXYCHLOROQUINE (PLAQUENIL) 200 MG TABLET    Take 1 tablet (200 mg total) by mouth 2 (two) times daily.    LISINOPRIL-HYDROCHLOROTHIAZIDE (PRINZIDE,ZESTORETIC) 20-12.5 MG PER TABLET    TAKE 1 TABLET BY MOUTH EVERY DAY   Discontinued Medications    PREDNISONE (DELTASONE) 20 MG TABLET    TK 1 T PO QD FOR 5 DAYS       ALLG  Review of patient's allergies indicates:   Allergen Reactions    Shellfish containing products     Sulfa (sulfonamide antibiotics) Hives     Other reaction(s): Unknown    Sulfamethoxazole-trimethoprim Hives       SOCIAL Hx  Social History     Socioeconomic History    Marital status:      Spouse name: ASHWIN    Number of children: 2    Years of education: Not on file    Highest education level: Not on file   Occupational History    Occupation: [employer]     Employer: [employer]   Social Needs    Financial resource strain: Not on file    Food insecurity:     Worry: Not on file     Inability: Not on file    Transportation needs:     Medical: Not on file     Non-medical: Not on file   Tobacco Use    Smoking status: Current Some Day Smoker     Packs/day: 0.50     Years: 15.00     Pack years: 7.50     Types: Cigarettes     Start date: 4/14/1995    Smokeless tobacco: Never Used   Substance and Sexual Activity    Alcohol use: Yes     Alcohol/week: 0.0 standard drinks     Comment: occasional use    Drug use: Not on file    Sexual activity: Not on file   Lifestyle    Physical activity:     Days per week: Not on file     Minutes per session: Not on file    Stress: Not on file   Relationships    Social connections:     Talks on phone: Not on file     Gets together: Not on file     Attends [employer] service: Not on file     Active member of club or organization: Not on file     Attends meetings of clubs or organizations: Not on file     Relationship status: Not on file   Other Topics Concern    Not on file   Social History Narrative  "   Not on file       FAM Hx  Family History   Problem Relation Age of Onset    Diabetes Mother     Hypertension Mother     Stroke Father     Hypertension Father     Asthma Brother     Heart disease Sister     Aneurysm Sister        Review of systems:  The patient denies nausea, vomiting, fevers, chills, shortness of breath, chest pain, and calf pain.      OBJECTIVE:  Vitals:    10/28/19 1140   BP: 133/82   Pulse: 86   Weight: 110.9 kg (244 lb 7.8 oz)   Height: 5' 11" (1.803 m)       LOWER EXTREMITY    VASCULAR  Dorsalis pedis and posterior tibial pulses palpable 2/4 bilaterally.   Capillary refill time immediate to the toes.   Feet are warm to the touch. Skin temperature warm to warm from proximally to distally   There are no varicosities, telangiectasias noted to bilateral foot and ankle regions.   There are no ecchymoses noted to bilateral foot and ankle regions.   There is no gross lower extremity edema.    DERMATOLOGIC  Skin moist with healthy texture and turgor.  There are no open ulcerations, lacerations, or fissures to bilateral foot and ankle regions. There are no signs of infection as there is no erythema, no proximal-extending lymphangiitis, no fluctuance, or crepitus noted on palpation to bilateral foot and ankle regions.   There is no interdigital maceration.   There are hyperkeratotic lesions noted to feet. Nails are well-trimmed.    NEUROLOGIC  Epicritic sensation is intact as the patient is able to sense light touch to bilateral foot and ankle regions. There is a negative Tinel's sign on percussion of the tibial nerve, dorsal cutaneous nerves, sural nerves of the RIGHT foot.  Achilles and patellar deep tendon reflexes intact  Babinski reflex absent    ORTHOPEDIC/BIOMECHANICAL  Pain on palpation dorsal lateral calcaneus, RIGHT  Negative pain with medial to lateral compression of calcaneus, RIGHT  Negative pain with palpation of medial tubercle of calcaneus, RIGHT  5/5 muscle strength in all 4 " quadrants.   Ankle joint range of motion decreased with knee extended and flexed b/l.    IMAGING   Reviewed by me and I agree with radiologist findings, 3 views of foot/ankle, reveal:  Results for orders placed during the hospital encounter of 05/25/16   X-Ray Foot Complete 3 view Bilateral    Narrative 3 views of either foot, 6 views total    Comparison: 08/20/2015    Findings: There is no evidence to suggest acute fracture or dislocation.  Small dorsal calcaneal enthesophyte noted on the left.  Moderate sized dorsal and plantar calcaneal enthesophytes present on the right.  The joint spaces appear to be relatively well-maintained.    Impression  As above  ______________________________________     Electronically signed by: JESSICA JACQUES D.O.  Date:     05/25/16  Time:    16:52            Results for orders placed during the hospital encounter of 05/25/16   X-Ray Foot Complete 3 view Bilateral    Narrative 3 views of either foot, 6 views total    Comparison: 08/20/2015    Findings: There is no evidence to suggest acute fracture or dislocation.  Small dorsal calcaneal enthesophyte noted on the left.  Moderate sized dorsal and plantar calcaneal enthesophytes present on the right.  The joint spaces appear to be relatively well-maintained.    Impression  As above  ______________________________________     Electronically signed by: JESSICA JACQUES D.O.  Date:     05/25/16  Time:    16:52        Results for orders placed during the hospital encounter of 01/14/19   X-Ray Foot Complete Left    Narrative EXAMINATION:  XR FOOT COMPLETE 3 VIEW LEFT    CLINICAL HISTORY:  .  Pain in left foot    TECHNIQUE:  AP, lateral and oblique views of the left foot were performed.    COMPARISON:  05/25/2016    FINDINGS:  There is no radiographic evidence of acute osseous, articular, or soft tissue abnormality.  Joint spaces are well preserved.  No erosive changes demonstrated. Small dorsal surface calcaneal enthesophyte noted.  No  appreciable change from prior.      Impression As Above.  No acute findings.      Electronically signed by: Stevie Moreno MD  Date:    01/14/2019  Time:    16:12        No results found for this or any previous visit.      ASSESSMENT     Encounter Diagnoses   Name Primary?    Inflammatory heel pain, right Yes    Polyarticular arthritis          PLAN:   1. The patient was examined and evaluated   2. Treatment options were discussed in detail; the patient elected to undergo conservative treatment. Xrays were ordered r/o stress fracture    Inflammatory heel pain, right  -     X-Ray Calcaneus 2 View Right; Future; Expected date: 10/28/2019    Polyarticular arthritis    Other orders  -     methylPREDNISolone (MEDROL DOSEPACK) 4 mg tablet; use as directed  Dispense: 1 Package; Refill: 0        Rx medroldosepak  Will order xray to r/o stress fx- unlikely   RTC in 2 weeks, possible injection    Future Appointments   Date Time Provider Department Center   11/12/2019 11:20 AM Shelli Fox DPM [facility] POD [place]   3/30/2020  2:00 PM Lissette Randhawa MD [facility] R[facility]       Report Electronically Signed By:    Shelli Fox DPM   Podiatric Medicine & Surgery  [facility]  10/28/2019    Disclaimer: This note was partially prepared using a voice recognition system and is likely to have sound alike errors within the text.

## 2019-11-01 DIAGNOSIS — E55.9 VITAMIN D DEFICIENCY: ICD-10-CM

## 2019-11-01 RX ORDER — ERGOCALCIFEROL 1.25 MG/1
CAPSULE ORAL
Qty: 10 CAPSULE | Refills: 0 | Status: SHIPPED | OUTPATIENT
Start: 2019-11-01 | End: 2019-12-31

## 2019-12-03 RX ORDER — LISINOPRIL AND HYDROCHLOROTHIAZIDE 12.5; 2 MG/1; MG/1
TABLET ORAL
Qty: 90 TABLET | Refills: 0 | Status: SHIPPED | OUTPATIENT
Start: 2019-12-03 | End: 2020-03-01

## 2019-12-31 DIAGNOSIS — E55.9 VITAMIN D DEFICIENCY: ICD-10-CM

## 2019-12-31 RX ORDER — ERGOCALCIFEROL 1.25 MG/1
CAPSULE ORAL
Qty: 10 CAPSULE | Refills: 0 | Status: SHIPPED | OUTPATIENT
Start: 2019-12-31 | End: 2020-02-05 | Stop reason: SDUPTHER

## 2020-01-30 DIAGNOSIS — L93.2 CUTANEOUS LUPUS ERYTHEMATOSUS: ICD-10-CM

## 2020-01-31 RX ORDER — HYDROXYCHLOROQUINE SULFATE 200 MG/1
TABLET, FILM COATED ORAL
Qty: 60 TABLET | Refills: 5 | Status: SHIPPED | OUTPATIENT
Start: 2020-01-31 | End: 2020-08-07

## 2020-02-04 ENCOUNTER — OFFICE VISIT (OUTPATIENT)
Dept: INTERNAL MEDICINE | Facility: CLINIC | Age: 45
End: 2020-02-04
Payer: COMMERCIAL

## 2020-02-04 ENCOUNTER — HOSPITAL ENCOUNTER (OUTPATIENT)
Dept: RADIOLOGY | Facility: HOSPITAL | Age: 45
Discharge: HOME | End: 2020-02-04
Attending: FAMILY MEDICINE
Payer: COMMERCIAL

## 2020-02-04 VITALS
HEART RATE: 80 BPM | SYSTOLIC BLOOD PRESSURE: 138 MMHG | HEIGHT: 71 IN | WEIGHT: 251.13 LBS | DIASTOLIC BLOOD PRESSURE: 92 MMHG | OXYGEN SATURATION: 98 % | BODY MASS INDEX: 35.16 KG/M2 | TEMPERATURE: 99 F | RESPIRATION RATE: 16 BRPM

## 2020-02-04 DIAGNOSIS — I10 ESSENTIAL HYPERTENSION: ICD-10-CM

## 2020-02-04 DIAGNOSIS — F41.8 ANXIETY ASSOCIATED WITH DEPRESSION: ICD-10-CM

## 2020-02-04 DIAGNOSIS — E66.01 SEVERE OBESITY (BMI 35.0-39.9) WITH COMORBIDITY: ICD-10-CM

## 2020-02-04 DIAGNOSIS — R51.9 SINUS HEADACHE: Primary | ICD-10-CM

## 2020-02-04 DIAGNOSIS — E55.9 VITAMIN D DEFICIENCY: ICD-10-CM

## 2020-02-04 DIAGNOSIS — Z72.0 TOBACCO ABUSE: ICD-10-CM

## 2020-02-04 DIAGNOSIS — R51.9 SINUS HEADACHE: ICD-10-CM

## 2020-02-04 PROBLEM — E66.811 OBESITY (BMI 30.0-34.9): Status: RESOLVED | Noted: 2018-01-23 | Resolved: 2020-02-04

## 2020-02-04 PROBLEM — E66.9 OBESITY (BMI 30.0-34.9): Status: RESOLVED | Noted: 2018-01-23 | Resolved: 2020-02-04

## 2020-02-04 PROCEDURE — 3008F PR BODY MASS INDEX (BMI) DOCUMENTED: ICD-10-PCS | Mod: CPTII,S$GLB,, | Performed by: FAMILY MEDICINE

## 2020-02-04 PROCEDURE — 99214 PR OFFICE/OUTPT VISIT, EST, LEVL IV, 30-39 MIN: ICD-10-PCS | Mod: S$GLB,,, | Performed by: FAMILY MEDICINE

## 2020-02-04 PROCEDURE — 70220 X-RAY EXAM OF SINUSES: CPT | Mod: 26,,, | Performed by: RADIOLOGY

## 2020-02-04 PROCEDURE — 3080F DIAST BP >= 90 MM HG: CPT | Mod: CPTII,S$GLB,, | Performed by: FAMILY MEDICINE

## 2020-02-04 PROCEDURE — 3075F PR MOST RECENT SYSTOLIC BLOOD PRESS GE 130-139MM HG: ICD-10-PCS | Mod: CPTII,S$GLB,, | Performed by: FAMILY MEDICINE

## 2020-02-04 PROCEDURE — 99999 PR PBB SHADOW E&M-EST. PATIENT-LVL III: ICD-10-PCS | Mod: PBBFAC,,, | Performed by: FAMILY MEDICINE

## 2020-02-04 PROCEDURE — 3080F PR MOST RECENT DIASTOLIC BLOOD PRESSURE >= 90 MM HG: ICD-10-PCS | Mod: CPTII,S$GLB,, | Performed by: FAMILY MEDICINE

## 2020-02-04 PROCEDURE — 3075F SYST BP GE 130 - 139MM HG: CPT | Mod: CPTII,S$GLB,, | Performed by: FAMILY MEDICINE

## 2020-02-04 PROCEDURE — 3008F BODY MASS INDEX DOCD: CPT | Mod: CPTII,S$GLB,, | Performed by: FAMILY MEDICINE

## 2020-02-04 PROCEDURE — 70220 XR SINUSES MIN 3 VIEWS: ICD-10-PCS | Mod: 26,,, | Performed by: RADIOLOGY

## 2020-02-04 PROCEDURE — 99214 OFFICE O/P EST MOD 30 MIN: CPT | Mod: S$GLB,,, | Performed by: FAMILY MEDICINE

## 2020-02-04 PROCEDURE — 99999 PR PBB SHADOW E&M-EST. PATIENT-LVL III: CPT | Mod: PBBFAC,,, | Performed by: FAMILY MEDICINE

## 2020-02-04 PROCEDURE — 70220 X-RAY EXAM OF SINUSES: CPT | Mod: TC

## 2020-02-04 RX ORDER — LEVOCETIRIZINE DIHYDROCHLORIDE 5 MG/1
5 TABLET, FILM COATED ORAL NIGHTLY
Qty: 30 TABLET | Refills: 11 | Status: SHIPPED | OUTPATIENT
Start: 2020-02-04 | End: 2020-05-01

## 2020-02-04 RX ORDER — BUPROPION HYDROCHLORIDE 75 MG/1
75 TABLET ORAL 2 TIMES DAILY
Qty: 60 TABLET | Refills: 2 | Status: SHIPPED | OUTPATIENT
Start: 2020-02-04 | End: 2020-05-17 | Stop reason: SDUPTHER

## 2020-02-04 NOTE — PROGRESS NOTES
"Subjective:       Patient ID: Yoandy Forde is a 44 y.o. male.    Chief Complaint: Headache; Dizziness; and Nausea    44-year-old  female patient with Patient Active Problem List:     Dermatophytosis of other specified sites     Undifferentiated connective tissue disease     Essential hypertension     Hemorrhoids     GERD (gastroesophageal reflux disease)     Sjogren's syndrome     Medication monitoring encounter     Tobacco abuse     Vitamin D deficiency     Severe obesity (BMI 35.0-39.9) with comorbidity  Here reports that patient has been having right-sided headache associated with occasional dizziness and nausea but denies any vomiting.  Patient reports headache up to 5/10, has been having occasional issues with sinuses with sinus congestion  Denies any fever with chills nausea vomiting  Patient has been working to get home sleep studies to evaluate for sleep apnea  Patient reports that he has been having low energy and feeling depressed lately.       Review of Systems   Constitutional: Positive for fatigue. Negative for appetite change.   HENT: Positive for sinus pressure and sinus pain.    Eyes: Negative for visual disturbance.   Respiratory: Negative for cough and shortness of breath.    Cardiovascular: Negative for chest pain, palpitations and leg swelling.   Gastrointestinal: Positive for nausea. Negative for abdominal pain and vomiting.   Musculoskeletal: Negative for myalgias.   Skin: Negative for rash.   Neurological: Positive for headaches.   Psychiatric/Behavioral: Positive for dysphoric mood and sleep disturbance. The patient is nervous/anxious.          BP (!) 138/92 (BP Location: Right arm, Patient Position: Sitting, BP Method: Large (Manual))   Pulse 80   Temp 98.8 °F (37.1 °C) (Oral)   Resp 16   Ht 5' 11" (1.803 m)   Wt 113.9 kg (251 lb 1.7 oz)   SpO2 98%   BMI 35.02 kg/m²   Objective:      Physical Exam   Constitutional: He is oriented to person, place, and time. He appears " well-developed and well-nourished.   HENT:   Head: Normocephalic and atraumatic.   Right Ear: External ear normal.   Left Ear: External ear normal.   Mouth/Throat: Oropharynx is clear and moist. No oropharyngeal exudate.   Positive for sinus tenderness in bilateral maxillary areas more on the right side   Neck: Neck supple.   Cardiovascular: Normal rate, regular rhythm and normal heart sounds.   No murmur heard.  Pulmonary/Chest: Effort normal and breath sounds normal. He has no wheezes.   Abdominal: Soft. Bowel sounds are normal. There is no tenderness.   Musculoskeletal: He exhibits no edema.   Lymphadenopathy:     He has no cervical adenopathy.   Neurological: He is alert and oriented to person, place, and time.   Skin: Skin is warm and dry. No rash noted.   Psychiatric: He has a normal mood and affect.         Assessment/Plan:   1. Sinus headache  - X-Ray Sinuses Min 3 Views; Future  - CBC auto differential; Future  - levocetirizine (XYZAL) 5 MG tablet; Take 1 tablet (5 mg total) by mouth every evening.  Dispense: 30 tablet; Refill: 11  Will get sinus x-ray to rule out sinus involvement causing headache  Xyzal prescribed today for symptomatic relief  Advised to continue using Flonase and drink adequate fluids  Okay to take Tylenol/ibuprofen as needed for headaches but not daily      2. Essential hypertension  - Basic metabolic panel; Future  Blood pressure mildly elevated likely secondary to headache  Currently taking lisinopril hydrochlorothiazide 20/12.5 mg daily  Encouraged to monitor blood pressure trends and restrict salt intake    3. Tobacco abuse  - buPROPion (WELLBUTRIN) 75 MG tablet; Take 1 tablet (75 mg total) by mouth 2 (two) times daily.  Dispense: 60 tablet; Refill: 2  Encouraged to quit smoking    4. Severe obesity (BMI 35.0-39.9) with comorbidity  Lifestyle modifications recommended to lose weight with BMI 35    5. Vitamin D deficiency  - Vitamin D; Future  Currently taking vitamin-D by  prescription weekly    6. Anxiety associated with depression  - buPROPion (WELLBUTRIN) 75 MG tablet; Take 1 tablet (75 mg total) by mouth 2 (two) times daily.  Dispense: 60 tablet; Refill: 2  Will start on Wellbutrin 75 mg twice daily secondary to anxiety associated with depression and with smoking history  Advised to avoid stress

## 2020-02-05 ENCOUNTER — PATIENT MESSAGE (OUTPATIENT)
Dept: INTERNAL MEDICINE | Facility: CLINIC | Age: 45
End: 2020-02-05

## 2020-02-05 ENCOUNTER — TELEPHONE (OUTPATIENT)
Dept: INTERNAL MEDICINE | Facility: CLINIC | Age: 45
End: 2020-02-05

## 2020-02-05 DIAGNOSIS — E55.9 VITAMIN D DEFICIENCY: ICD-10-CM

## 2020-02-05 RX ORDER — ERGOCALCIFEROL 1.25 MG/1
50000 CAPSULE ORAL
Qty: 10 CAPSULE | Refills: 0 | Status: SHIPPED | OUTPATIENT
Start: 2020-02-05 | End: 2020-03-18 | Stop reason: SDUPTHER

## 2020-02-05 NOTE — TELEPHONE ENCOUNTER
..Spoke with patient regarding lab and XR results. Patient voiced no further questions or concerns./anabella

## 2020-02-05 NOTE — TELEPHONE ENCOUNTER
----- Message from Tanja Monsalve sent at 2/5/2020 10:18 AM CST -----  Type:  Test Results    Who Called:  Jimmy Pitt  Name of Test (Lab/Mammo/Etc):   X-ray and lab work  Date of Test:   2-4-20  Ordering Provider:   Dr Feliciano  Where the test was performed: the [facility]  Would the patient rather a call back or a response via [facility]?   Call back  Best Call Back Number:    [phone]  Additional Information:   Please call//sergo/

## 2020-02-12 ENCOUNTER — TELEPHONE (OUTPATIENT)
Dept: OPHTHALMOLOGY | Facility: CLINIC | Age: 45
End: 2020-02-12

## 2020-02-12 NOTE — TELEPHONE ENCOUNTER
----- Message from Ruth Fitzgerald sent at 2/12/2020 10:33 AM CST -----  Contact: self  needs to know name of otc eyedrop dr vieira....[phone]

## 2020-02-13 ENCOUNTER — PATIENT MESSAGE (OUTPATIENT)
Dept: INTERNAL MEDICINE | Facility: CLINIC | Age: 45
End: 2020-02-13

## 2020-02-29 ENCOUNTER — PATIENT MESSAGE (OUTPATIENT)
Dept: ADMINISTRATIVE | Facility: OTHER | Age: 45
End: 2020-02-29

## 2020-03-01 ENCOUNTER — PATIENT MESSAGE (OUTPATIENT)
Dept: INTERNAL MEDICINE | Facility: CLINIC | Age: 45
End: 2020-03-01

## 2020-03-01 RX ORDER — LISINOPRIL AND HYDROCHLOROTHIAZIDE 12.5; 2 MG/1; MG/1
TABLET ORAL
Qty: 90 TABLET | Refills: 0 | Status: SHIPPED | OUTPATIENT
Start: 2020-03-01 | End: 2020-05-29

## 2020-03-02 DIAGNOSIS — Z13.79 GENETIC SCREENING: ICD-10-CM

## 2020-03-16 ENCOUNTER — PATIENT MESSAGE (OUTPATIENT)
Dept: INTERNAL MEDICINE | Facility: CLINIC | Age: 45
End: 2020-03-16

## 2020-03-16 ENCOUNTER — PATIENT MESSAGE (OUTPATIENT)
Dept: RHEUMATOLOGY | Facility: CLINIC | Age: 45
End: 2020-03-16

## 2020-03-18 DIAGNOSIS — E55.9 VITAMIN D DEFICIENCY: ICD-10-CM

## 2020-03-18 RX ORDER — ERGOCALCIFEROL 1.25 MG/1
50000 CAPSULE ORAL
Qty: 10 CAPSULE | Refills: 0 | Status: SHIPPED | OUTPATIENT
Start: 2020-03-18 | End: 2021-04-13

## 2020-03-26 ENCOUNTER — PATIENT MESSAGE (OUTPATIENT)
Dept: INTERNAL MEDICINE | Facility: CLINIC | Age: 45
End: 2020-03-26

## 2020-03-27 ENCOUNTER — OFFICE VISIT (OUTPATIENT)
Dept: INTERNAL MEDICINE | Facility: CLINIC | Age: 45
End: 2020-03-27
Payer: COMMERCIAL

## 2020-03-27 ENCOUNTER — PATIENT MESSAGE (OUTPATIENT)
Dept: INTERNAL MEDICINE | Facility: CLINIC | Age: 45
End: 2020-03-27

## 2020-03-27 DIAGNOSIS — J06.9 URTI (ACUTE UPPER RESPIRATORY INFECTION): Primary | ICD-10-CM

## 2020-03-27 DIAGNOSIS — J30.1 CHRONIC SEASONAL ALLERGIC RHINITIS DUE TO POLLEN: ICD-10-CM

## 2020-03-27 PROCEDURE — 99213 PR OFFICE/OUTPT VISIT, EST, LEVL III, 20-29 MIN: ICD-10-PCS | Mod: 95,,, | Performed by: FAMILY MEDICINE

## 2020-03-27 PROCEDURE — 99213 OFFICE O/P EST LOW 20 MIN: CPT | Mod: 95,,, | Performed by: FAMILY MEDICINE

## 2020-03-27 RX ORDER — FLUTICASONE PROPIONATE 50 MCG
2 SPRAY, SUSPENSION (ML) NASAL DAILY
Qty: 16 G | Refills: 6 | Status: SHIPPED | OUTPATIENT
Start: 2020-03-27 | End: 2021-09-30 | Stop reason: SDUPTHER

## 2020-03-27 NOTE — PROGRESS NOTES
The patient location is: Home  The chief complaint leading to consultation is:  Cough.  Visit type: Virtual visit with synchronous audio and video  Total time spent with patient:  10 min  Each patient to whom he or she provides medical services by telemedicine is:  (1) informed of the relationship between the physician and patient and the respective role of any other health care provider with respect to management of the patient; and (2) notified that he or she may decline to receive medical services by telemedicine and may withdraw from such care at any time.    Notes:   Subjective:       Patient ID: Yoandy Forde is a 44 y.o. male.    Chief Complaint: No chief complaint on file.    44-year-old  male patient with Patient Active Problem List:     Dermatophytosis of other specified sites     Undifferentiated connective tissue disease     Essential hypertension     Hemorrhoids     GERD (gastroesophageal reflux disease)     Sjogren's syndrome     Medication monitoring encounter     Tobacco abuse     Vitamin D deficiency     Severe obesity (BMI 35.0-39.9) with comorbidity  Here reports that patient has been having yellowish mucus productive cough with minimal headache and redness to the eyes, denies any fever with chills, difficulty breathing.  Reports having symptoms ongoing for the past 1 week.  Has been taking Xyzal regularly.  Denies any recent travel or known exposure to COVID 19  Reports minimal fatigue.  Patient has been out of Flonase.     Review of Systems   Constitutional: Negative for appetite change, chills, fatigue and fever.   HENT: Positive for congestion and postnasal drip. Negative for sore throat.    Eyes: Positive for redness. Negative for discharge, itching and visual disturbance.   Respiratory: Positive for cough. Negative for shortness of breath and wheezing.    Cardiovascular: Negative for chest pain, palpitations and leg swelling.   Gastrointestinal: Negative for abdominal pain,  nausea and vomiting.   Musculoskeletal: Negative for myalgias.   Skin: Negative for rash.   Neurological: Positive for headaches.   Psychiatric/Behavioral: Negative for sleep disturbance.         There were no vitals taken for this visit.  Objective:      Physical Exam   Constitutional: He is oriented to person, place, and time. He appears well-developed and well-nourished.   HENT:   Head: Normocephalic and atraumatic.   Neurological: He is alert and oriented to person, place, and time.   Psychiatric: He has a normal mood and affect.         Assessment/Plan:   1. URTI (acute upper respiratory infection)  2. Chronic seasonal allergic rhinitis due to pollen  - fluticasone propionate (FLONASE) 50 mcg/actuation nasal spray; 2 sprays (100 mcg total) by Each Nostril route once daily.  Dispense: 16 g; Refill: 6    Looks like allergies.  Patient was advised to start using Flonase as prescribed and add Mucinex for cough.  Continue Xyzal.  Drink adequate fluids.  Continue to monitor for temperature elevation.     If you are concerned you might have COVID-19 coronavirus disease,  CALL BEFORE YOU ACT.  KNOW BEFORE YOU GO to the doctor or ER.    [facility] COVID 19 Information Line: [phone]  [facility] [place] [email or web address]

## 2020-03-29 ENCOUNTER — PATIENT MESSAGE (OUTPATIENT)
Dept: INTERNAL MEDICINE | Facility: CLINIC | Age: 45
End: 2020-03-29

## 2020-03-29 ENCOUNTER — PATIENT OUTREACH (OUTPATIENT)
Dept: ADMINISTRATIVE | Facility: OTHER | Age: 45
End: 2020-03-29

## 2020-03-30 ENCOUNTER — TELEPHONE (OUTPATIENT)
Dept: RHEUMATOLOGY | Facility: CLINIC | Age: 45
End: 2020-03-30

## 2020-03-30 NOTE — TELEPHONE ENCOUNTER
----- Message from Savannah Seaman sent at 3/30/2020 10:29 AM CDT -----  Contact: self  Type:  Patient Returning Call    Who Called:Yoandy  Who Left Message for Patient:  Does the patient know what this is regarding?yes  Would the patient rather a call back or a response via [facility]? call  Best Call Back Number:[phone]    Additional Information:

## 2020-03-31 ENCOUNTER — APPOINTMENT (OUTPATIENT)
Dept: INTERNAL MEDICINE | Facility: CLINIC | Age: 45
End: 2020-03-31
Payer: COMMERCIAL

## 2020-03-31 DIAGNOSIS — D84.9 IMMUNOCOMPROMISED: Primary | ICD-10-CM

## 2020-03-31 DIAGNOSIS — R50.9 FEVER, UNSPECIFIED FEVER CAUSE: ICD-10-CM

## 2020-03-31 LAB
CTP QC/QA: YES
POC MOLECULAR INFLUENZA A AGN: NEGATIVE
POC MOLECULAR INFLUENZA B AGN: NEGATIVE

## 2020-03-31 PROCEDURE — U0002 COVID-19 LAB TEST NON-CDC: HCPCS

## 2020-03-31 NOTE — TELEPHONE ENCOUNTER
Dr. Feliciano there is a picture of a thermometer attached in the response from the patient. He now has a temperature of 100.8. I will call patient-what would you like me to advise him to do?

## 2020-04-01 ENCOUNTER — PATIENT MESSAGE (OUTPATIENT)
Dept: INTERNAL MEDICINE | Facility: CLINIC | Age: 45
End: 2020-04-01

## 2020-04-02 ENCOUNTER — OFFICE VISIT (OUTPATIENT)
Dept: RHEUMATOLOGY | Facility: CLINIC | Age: 45
End: 2020-04-02
Payer: COMMERCIAL

## 2020-04-02 ENCOUNTER — PATIENT MESSAGE (OUTPATIENT)
Dept: INTERNAL MEDICINE | Facility: CLINIC | Age: 45
End: 2020-04-02

## 2020-04-02 ENCOUNTER — PATIENT MESSAGE (OUTPATIENT)
Dept: RHEUMATOLOGY | Facility: CLINIC | Age: 45
End: 2020-04-02

## 2020-04-02 ENCOUNTER — HOSPITAL ENCOUNTER (EMERGENCY)
Facility: HOSPITAL | Age: 45
Discharge: HOME | End: 2020-04-02
Attending: EMERGENCY MEDICINE
Payer: COMMERCIAL

## 2020-04-02 ENCOUNTER — TELEPHONE (OUTPATIENT)
Dept: INTERNAL MEDICINE | Facility: CLINIC | Age: 45
End: 2020-04-02

## 2020-04-02 VITALS
SYSTOLIC BLOOD PRESSURE: 119 MMHG | HEART RATE: 87 BPM | TEMPERATURE: 99 F | RESPIRATION RATE: 20 BRPM | DIASTOLIC BLOOD PRESSURE: 79 MMHG | OXYGEN SATURATION: 99 %

## 2020-04-02 DIAGNOSIS — R06.02 SOB (SHORTNESS OF BREATH): ICD-10-CM

## 2020-04-02 DIAGNOSIS — U07.1 COVID-19 VIRUS INFECTION: Primary | ICD-10-CM

## 2020-04-02 DIAGNOSIS — L93.2 CUTANEOUS LUPUS ERYTHEMATOSUS: Primary | ICD-10-CM

## 2020-04-02 DIAGNOSIS — J20.9 ACUTE BRONCHITIS, UNSPECIFIED ORGANISM: ICD-10-CM

## 2020-04-02 DIAGNOSIS — R05.9 COUGH: ICD-10-CM

## 2020-04-02 DIAGNOSIS — Z72.0 TOBACCO ABUSE: ICD-10-CM

## 2020-04-02 LAB
ALBUMIN SERPL BCP-MCNC: 4.3 G/DL (ref 3.5–5.2)
ALP SERPL-CCNC: 47 U/L (ref 55–135)
ALT SERPL W/O P-5'-P-CCNC: 35 U/L (ref 10–44)
ANION GAP SERPL CALC-SCNC: 10 MMOL/L (ref 8–16)
AST SERPL-CCNC: 51 U/L (ref 10–40)
BASOPHILS # BLD AUTO: 0.02 K/UL (ref 0–0.2)
BASOPHILS NFR BLD: 0.5 % (ref 0–1.9)
BILIRUB SERPL-MCNC: 0.3 MG/DL (ref 0.1–1)
BUN SERPL-MCNC: 16 MG/DL (ref 6–20)
CALCIUM SERPL-MCNC: 9.5 MG/DL (ref 8.7–10.5)
CHLORIDE SERPL-SCNC: 104 MMOL/L (ref 95–110)
CO2 SERPL-SCNC: 23 MMOL/L (ref 23–29)
CREAT SERPL-MCNC: 1.6 MG/DL (ref 0.5–1.4)
CRP SERPL-MCNC: 18 MG/L (ref 0–8.2)
DIFFERENTIAL METHOD: ABNORMAL
EOSINOPHIL # BLD AUTO: 0 K/UL (ref 0–0.5)
EOSINOPHIL NFR BLD: 0.5 % (ref 0–8)
ERYTHROCYTE [DISTWIDTH] IN BLOOD BY AUTOMATED COUNT: 12.8 % (ref 11.5–14.5)
EST. GFR  (AFRICAN AMERICAN): 58 ML/MIN/1.73 M^2
EST. GFR  (NON AFRICAN AMERICAN): 51 ML/MIN/1.73 M^2
GLUCOSE SERPL-MCNC: 92 MG/DL (ref 70–110)
HCT VFR BLD AUTO: 43 % (ref 40–54)
HGB BLD-MCNC: 14.6 G/DL (ref 14–18)
HIV 1+2 AB+HIV1 P24 AG SERPL QL IA: NEGATIVE
IMM GRANULOCYTES # BLD AUTO: 0.02 K/UL (ref 0–0.04)
IMM GRANULOCYTES NFR BLD AUTO: 0.5 % (ref 0–0.5)
LACTATE SERPL-SCNC: 0.9 MMOL/L (ref 0.5–2.2)
LYMPHOCYTES # BLD AUTO: 1.3 K/UL (ref 1–4.8)
LYMPHOCYTES NFR BLD: 32.7 % (ref 18–48)
MCH RBC QN AUTO: 30.3 PG (ref 27–31)
MCHC RBC AUTO-ENTMCNC: 34 G/DL (ref 32–36)
MCV RBC AUTO: 89 FL (ref 82–98)
MONOCYTES # BLD AUTO: 0.6 K/UL (ref 0.3–1)
MONOCYTES NFR BLD: 15.2 % (ref 4–15)
NEUTROPHILS # BLD AUTO: 1.9 K/UL (ref 1.8–7.7)
NEUTROPHILS NFR BLD: 50.6 % (ref 38–73)
NRBC BLD-RTO: 0 /100 WBC
PLATELET # BLD AUTO: 176 K/UL (ref 150–350)
PMV BLD AUTO: 10.9 FL (ref 9.2–12.9)
POTASSIUM SERPL-SCNC: 4 MMOL/L (ref 3.5–5.1)
PROT SERPL-MCNC: 7.6 G/DL (ref 6–8.4)
RBC # BLD AUTO: 4.82 M/UL (ref 4.6–6.2)
SARS-COV-2 RNA RESP QL NAA+PROBE: DETECTED
SODIUM SERPL-SCNC: 137 MMOL/L (ref 136–145)
TROPONIN I SERPL DL<=0.01 NG/ML-MCNC: <0.006 NG/ML (ref 0–0.03)
WBC # BLD AUTO: 3.82 K/UL (ref 3.9–12.7)

## 2020-04-02 PROCEDURE — 36415 COLL VENOUS BLD VENIPUNCTURE: CPT

## 2020-04-02 PROCEDURE — 86140 C-REACTIVE PROTEIN: CPT

## 2020-04-02 PROCEDURE — 86703 HIV-1/HIV-2 1 RESULT ANTBDY: CPT

## 2020-04-02 PROCEDURE — 96360 HYDRATION IV INFUSION INIT: CPT

## 2020-04-02 PROCEDURE — 85025 COMPLETE CBC W/AUTO DIFF WBC: CPT

## 2020-04-02 PROCEDURE — 83605 ASSAY OF LACTIC ACID: CPT

## 2020-04-02 PROCEDURE — 87186 SC STD MICRODIL/AGAR DIL: CPT

## 2020-04-02 PROCEDURE — 87040 BLOOD CULTURE FOR BACTERIA: CPT

## 2020-04-02 PROCEDURE — 84484 ASSAY OF TROPONIN QUANT: CPT

## 2020-04-02 PROCEDURE — 63600175 PHARM REV CODE 636 W HCPCS: Performed by: EMERGENCY MEDICINE

## 2020-04-02 PROCEDURE — 99285 EMERGENCY DEPT VISIT HI MDM: CPT | Mod: 25

## 2020-04-02 PROCEDURE — 80053 COMPREHEN METABOLIC PANEL: CPT

## 2020-04-02 PROCEDURE — 82728 ASSAY OF FERRITIN: CPT

## 2020-04-02 PROCEDURE — 87077 CULTURE AEROBIC IDENTIFY: CPT

## 2020-04-02 PROCEDURE — 99214 PR OFFICE/OUTPT VISIT, EST, LEVL IV, 30-39 MIN: ICD-10-PCS | Mod: 95,,, | Performed by: INTERNAL MEDICINE

## 2020-04-02 PROCEDURE — 96361 HYDRATE IV INFUSION ADD-ON: CPT

## 2020-04-02 PROCEDURE — 99214 OFFICE O/P EST MOD 30 MIN: CPT | Mod: 95,,, | Performed by: INTERNAL MEDICINE

## 2020-04-02 RX ORDER — AZITHROMYCIN 250 MG/1
TABLET, FILM COATED ORAL
Qty: 6 TABLET | Refills: 0 | Status: SHIPPED | OUTPATIENT
Start: 2020-04-02 | End: 2020-04-07

## 2020-04-02 RX ADMIN — SODIUM CHLORIDE 2000 ML: 0.9 INJECTION, SOLUTION INTRAVENOUS at 07:04

## 2020-04-02 NOTE — ED PROVIDER NOTES
Encounter Date: 4/2/2020       History     Chief Complaint   Patient presents with    Shortness of Breath     increased fatigue; positive for COVID     The history is provided by the patient.   Shortness of Breath   This is a new problem. The problem occurs continuously.The current episode started more than 2 days ago. The problem has not changed since onset.Associated symptoms include a fever, rhinorrhea, sore throat and cough.   Pt sent to ED for Covid+ test.    Review of patient's allergies indicates:   Allergen Reactions    Shellfish containing products     Sulfa (sulfonamide antibiotics) Hives     Other reaction(s): Unknown    Sulfamethoxazole-trimethoprim Hives     Past Medical History:   Diagnosis Date    Hyperglycemia     Hypertension     Lupus erythematosus     MCTD (mixed connective tissue disease)     Dr Branham     Vitiligo      Past Surgical History:   Procedure Laterality Date    NASAL SEPTUM SURGERY      neck lipoma excision       Family History   Problem Relation Age of Onset    Diabetes Mother     Hypertension Mother     Stroke Father     Hypertension Father     Asthma Brother     Heart disease Sister     Aneurysm Sister      Social History     Tobacco Use    Smoking status: Current Some Day Smoker     Packs/day: 0.50     Years: 15.00     Pack years: 7.50     Types: Cigarettes     Start date: 4/14/1995    Smokeless tobacco: Never Used   Substance Use Topics    Alcohol use: Yes     Alcohol/week: 0.0 standard drinks     Comment: occasional use    Drug use: Not on file     Review of Systems   Constitutional: Positive for fever.   HENT: Positive for rhinorrhea and sore throat.    Respiratory: Positive for cough and shortness of breath.    All other systems reviewed and are negative.      Physical Exam     Initial Vitals   BP Pulse Resp Temp SpO2   04/02/20 1723 04/02/20 1725 -- -- 04/02/20 1725   124/82 89   99 %      MAP       --                Physical Exam    Nursing note and  vitals reviewed.  Constitutional: He appears well-developed and well-nourished. No distress.   HENT:   Head: Normocephalic and atraumatic.   Mouth/Throat: Oropharynx is clear and moist.   Eyes: Conjunctivae and EOM are normal. Pupils are equal, round, and reactive to light.   Neck: Normal range of motion. Neck supple.   Cardiovascular: Normal rate, regular rhythm and normal heart sounds.   Pulmonary/Chest: Breath sounds normal. No respiratory distress.   Abdominal: Soft. Bowel sounds are normal.   Musculoskeletal: Normal range of motion.   Neurological: He is alert and oriented to person, place, and time. He has normal strength.   Skin: Skin is warm and dry.   Psychiatric: He has a normal mood and affect. Thought content normal.         ED Course   Procedures  Labs Reviewed   CBC W/ AUTO DIFFERENTIAL - Abnormal; Notable for the following components:       Result Value    WBC 3.82 (*)     Mono% 15.2 (*)     All other components within normal limits   COMPREHENSIVE METABOLIC PANEL - Abnormal; Notable for the following components:    Creatinine 1.6 (*)     Alkaline Phosphatase 47 (*)     AST 51 (*)     eGFR if  58 (*)     eGFR if non  51 (*)     All other components within normal limits   C-REACTIVE PROTEIN - Abnormal; Notable for the following components:    CRP 18.0 (*)     All other components within normal limits   CULTURE, BLOOD   CULTURE, BLOOD   HIV 1 / 2 ANTIBODY   LACTIC ACID, PLASMA   TROPONIN I   FERRITIN     Results for orders placed or performed during the hospital encounter of 04/02/20   HIV 1/2 Ag/Ab (4th Gen)   Result Value Ref Range    HIV 1/2 Ag/Ab Negative Negative   CBC auto differential   Result Value Ref Range    WBC 3.82 (L) 3.90 - 12.70 K/uL    RBC 4.82 4.60 - 6.20 M/uL    Hemoglobin 14.6 14.0 - 18.0 g/dL    Hematocrit 43.0 40.0 - 54.0 %    Mean Corpuscular Volume 89 82 - 98 fL    Mean Corpuscular Hemoglobin 30.3 27.0 - 31.0 pg    Mean Corpuscular Hemoglobin Conc  34.0 32.0 - 36.0 g/dL    RDW 12.8 11.5 - 14.5 %    Platelets 176 150 - 350 K/uL    MPV 10.9 9.2 - 12.9 fL    Immature Granulocytes 0.5 0.0 - 0.5 %    Gran # (ANC) 1.9 1.8 - 7.7 K/uL    Immature Grans (Abs) 0.02 0.00 - 0.04 K/uL    Lymph # 1.3 1.0 - 4.8 K/uL    Mono # 0.6 0.3 - 1.0 K/uL    Eos # 0.0 0.0 - 0.5 K/uL    Baso # 0.02 0.00 - 0.20 K/uL    nRBC 0 0 /100 WBC    Gran% 50.6 38.0 - 73.0 %    Lymph% 32.7 18.0 - 48.0 %    Mono% 15.2 (H) 4.0 - 15.0 %    Eosinophil% 0.5 0.0 - 8.0 %    Basophil% 0.5 0.0 - 1.9 %    Differential Method Automated    Comprehensive metabolic panel   Result Value Ref Range    Sodium 137 136 - 145 mmol/L    Potassium 4.0 3.5 - 5.1 mmol/L    Chloride 104 95 - 110 mmol/L    CO2 23 23 - 29 mmol/L    Glucose 92 70 - 110 mg/dL    BUN, Bld 16 6 - 20 mg/dL    Creatinine 1.6 (H) 0.5 - 1.4 mg/dL    Calcium 9.5 8.7 - 10.5 mg/dL    Total Protein 7.6 6.0 - 8.4 g/dL    Albumin 4.3 3.5 - 5.2 g/dL    Total Bilirubin 0.3 0.1 - 1.0 mg/dL    Alkaline Phosphatase 47 (L) 55 - 135 U/L    AST 51 (H) 10 - 40 U/L    ALT 35 10 - 44 U/L    Anion Gap 10 8 - 16 mmol/L    eGFR if African American 58 (A) >60 mL/min/1.73 m^2    eGFR if non African American 51 (A) >60 mL/min/1.73 m^2   Lactic Acid, Plasma   Result Value Ref Range    Lactate (Lactic Acid) 0.9 0.5 - 2.2 mmol/L   C-reactive protein   Result Value Ref Range    CRP 18.0 (H) 0.0 - 8.2 mg/L   Troponin I   Result Value Ref Range    Troponin I <0.006 0.000 - 0.026 ng/mL            Imaging Results          X-Ray Chest 1 View (Final result)  Result time 04/02/20 19:21:07    Final result by MARLEEN La Sr., MD (04/02/20 19:21:07)                 Impression:      1. There is no evidence of an acute pulmonary process.  2. The size of the heart is prominent.  This may be secondary to magnification.  .      Electronically signed by: Bi La MD  Date:    04/02/2020  Time:    19:21             Narrative:    EXAMINATION:  XR CHEST 1 VIEW    CLINICAL  HISTORY:  Cough    COMPARISON:  06/29/2018    FINDINGS:  The size of the heart is prominent.  There is no evidence of an acute pulmonary process.  There is no pneumothorax.  The costophrenic angles are sharp.                            8:04 PM Discussed case with Pulm Dr Rafael buck c outpt [facility]    8:04 PM - Counseling: Spoke with the patient and discussed todays findings, in addition to providing specific details for the plan of care and counseling regarding the diagnosis and prognosis. Questions are answered at this time.                                        Clinical Impression:       ICD-10-CM ICD-9-CM   1. COVID-19 virus infection U07.1    2. Cough R05 786.2   3. SOB (shortness of breath) R06.02 786.05   4. Acute bronchitis, unspecified organism J20.9 466.0         Disposition:   Disposition: Discharged  Condition: Stable     ED Disposition Condition    Discharge Stable        ED Prescriptions     None        Follow-up Information     Follow up With Specialties Details Why Contact Info    Mary Feliciano MD Family Medicine Call in 1 day  [id number]  [place] [address]  [phone]      [facility] -  Emergency Medicine  If symptoms worsen [address] [facility] [id number] [phone]                                     Alexis Hernandez MD  04/02/20 2006

## 2020-04-02 NOTE — TELEPHONE ENCOUNTER
Okay please continue to take Tylenol for your fever. Please let us know when you receive your test results. Stay home and self quarantine for 14 days since you are having symptoms of COVID 19. If you have any further questions please contact our office.

## 2020-04-02 NOTE — TELEPHONE ENCOUNTER
Called and spoke with patient. He has already started taking Azithromycin. He is taking Tylenol for fever. Advised him to stay home and quarantine himself to a room for 14 days. To make sure he continues hand hygiene and make sure he is drinking a lot of fluids. Pt states he is already on a probiotic and will let us know if he starts with any NVD.

## 2020-04-02 NOTE — PROGRESS NOTES
The patient location is: Home   The chief complaint leading to consultation is:  Follow-up cutaneous lupus/Sjogren's  Visit type: Virtual visit with synchronous audio and video  Total time spent with patient: 30 min   Each patient to whom he or she provides medical services by telemedicine is:  (1) informed of the relationship between the physician and patient and the respective role of any other health care provider with respect to management of the patient; and (2) notified that he or she may decline to receive medical services by telemedicine and may withdraw from such care at any time.        History of Present Illness:  Yoandy Staples a 45 y.o.yo male   Patient Active Problem List   Diagnosis    Dermatophytosis of other specified sites    Undifferentiated connective tissue disease    Essential hypertension    Hemorrhoids    GERD (gastroesophageal reflux disease)    Sjogren's syndrome    Medication monitoring encounter    Tobacco abuse    Vitamin D deficiency    Severe obesity (BMI 35.0-39.9) with comorbidity       Video visit for routine follow-up of cutaneous lupus and  UCTD/ Sjogren syndrome  He had a positive DONG 1 : 160 nucleolar with positive SSA in 2012  He had a biopsy of the lesion in the scalp which was suggestive of cutaneous lupus  History of dry eyes and dry mouth which is stable  Uses Restasis for dry eyes  He is on Plaquenil 200 mg p.o. b.i.d. since then  (Advised he needs annual eye exams while on Plaquenil)  Last eye checkup was done in March 2019 and he denies any vision changes, no pre-existing eye conditions    No new rashes  His major concern today is fever and cough with productive sputum, 101 to begin but now normal   No known exposure   He was evaluated by his PCP last week  But he continued to have fevers so he was checked for COVID after a negative flu test  He is not on any antibiotics  He is resting at home  He denies any chest pain or shortness of breath    History  He also  has a history of vitiligo predominantly involving chin area  He has used a chemical dye and after that he developed this depigmentation even before 2012  Later he developed lesions on the scalp and ears,  in the back  This was biopsied suggestive of possible cutaneous lupus on review  His more recent DONG have all been negative and he had normal complements and inflammatory markers  He has old lesions involving scalp and both ears, healed and no further progression noted  No new rashes    Continues to smoke    Past Medical History:   Diagnosis Date    Hyperglycemia     Hypertension     Lupus erythematosus     MCTD (mixed connective tissue disease)     Dr Branham     Vitiligo        Past Surgical History:   Procedure Laterality Date    NASAL SEPTUM SURGERY      neck lipoma excision           Social History     Tobacco Use    Smoking status: Current Some Day Smoker     Packs/day: 0.50     Years: 15.00     Pack years: 7.50     Types: Cigarettes     Start date: 4/14/1995    Smokeless tobacco: Never Used   Substance Use Topics    Alcohol use: Yes     Alcohol/week: 0.0 standard drinks     Comment: occasional use    Drug use: Not on file       Family History   Problem Relation Age of Onset    Diabetes Mother     Hypertension Mother     Stroke Father     Hypertension Father     Asthma Brother     Heart disease Sister     Aneurysm Sister        Review of patient's allergies indicates:   Allergen Reactions    Sulfa (sulfonamide antibiotics) Hives     Other reaction(s): Unknown    Sulfamethoxazole-trimethoprim Hives             Review of Systems:  Constitutional: + fevers   Fatigue: no  Muscle weakness: no  Headaches: no new headaches  Eyes: No redness + dryness.  No recent visual changes.  ENT: + dry mouth. No oral or nasal ulcers.  Card: No chest pain.  Resp:  Per HPI  Gastro: No nausea or vomiting.  No heartburn.  Constipation: no  Diarrhea: no  Genito:  No dysuria.  No genital ulcers.  Skin: No  rash.  Raynauds:no  Neuro: No numbness / tingling.   Psych: No depression, anxiety  Endo:  no excess thirst.  Heme: no abnormal bleeding or bruising  Clots:none       OBJECTIVE:     Vital Signs   Temp 98.5   Blood pressure stable per him      Laboratory:   Results for orders placed or performed in visit on 03/31/20   POCT Influenza A/B Molecular   Result Value Ref Range    POC Molecular Influenza A Ag Negative Negative, Not Reported    POC Molecular Influenza B Ag Negative Negative, Not Reported     Acceptable Yes      Imaging :FINDINGS:  No acute fracture or dislocation.  Hip joint spaces maintained with symmetric superior acetabular over coverage noted.  Degenerative findings of the lower lumbar spine present.  No concerning soft tissue abnormality.    Notes reviewed  Other procedures:    ASSESSMENT/PLAN:     Cutaneous lupus erythematosus  -     CBC auto differential; Standing  -     Comprehensive metabolic panel; Standing  -     C-Reactive Protein; Standing  -     Sedimentation rate; Standing  -     Anti-DNA antibody, double-stranded; Standing; Expected date: 04/02/2020  -     C4 complement; Standing; Expected date: 04/02/2020  -     C3 complement; Standing; Expected date: 04/02/2020  -     Urinalysis; Standing    Acute bronchitis, unspecified organism  -     azithromycin (Z-JAMAR) 250 MG tablet; Take 2 tablets by mouth on day 1; Take 1 tablet by mouth on days 2-5  Dispense: 6 tablet; Refill: 0    Tobacco abuse      1:  UCTD- cutaneous lupus by history, + skin biopsy/ Sjogren syndrome  No active skin lesions  Reviewed old biopsy results from [facility]  Stable  Continue Restasis for dry eyes  Stable labs, normal complements, inflammatory markers, UA, few months back  Continue with Plaquenil 200 b.i.d.  Schedule appointment with ophthalmology for annual eye checkups- call for appointment  Quit smoking    Exercise, healthy lifestyle    2:  Acute bronchitis:  Flu negative  COVID pending    I discussed with  his PCP Dr. Feliciano and she is agreeable to start Z-Michele  Z-Michele called in  Notify PCP if any further worsening noted    No smoking     Return in May with labs    Risks vs Benefits and potential side effects of medication prescribed today were discussed with patient. Medication literature given to patient up discharge  Went over uptodate information /literature on the meds prescribed today    Plaquenil skin pigmentation always use sunscreens, ocular toxicity recommend annual eye checkups, rare myositis discussed    Disclaimer: This note was prepared using voice recognition system and is likely to have sound alike errors and is not proof read.  Please call me with any questions.

## 2020-04-02 NOTE — TELEPHONE ENCOUNTER
Your test was POSITIVE for COVID-19 (coronavirus).     [facility]  Preventing the Spread of Coronavirus Disease 2019 in Homes and Residential Communities      Prevention steps for people with confirmed or suspected COVID-19 (including persons under investigation) who do not need to be hospitalized and people with confirmed COVID-19 who were hospitalized and determined to be medically stable to go home.    Your healthcare provider and public health staff will evaluate whether you can be cared for at home.   Stay home except to get medical care.   Separate yourself from other people and animals in your home   Call ahead before visiting your doctor.   Wear a facemask.   Cover your coughs and sneezes.   Clean your hands often.   Avoid sharing personal household items.   Clean all high-touch surfaces every day.   Monitor your symptoms. Seek prompt medical attention if your illness is worsening (e.g., difficulty breathing). Before seeking care, call your healthcare provider.   If you have a medical emergency and need to call 911, notify the dispatch personnel that you have, or are being evaluated for COVID-19. If possible, put on a facemask before emergency medical services arrive.   Discontinuing home isolation. Call your provider about guidance to discontinue home isolation.    Recommended precautions for household members, intimate partners, and caregivers in a nonhealthcare setting of a patient with symptomatic laboratory-confirmed COVID-19 or a patient under investigation.  Household members, intimate partners, and caregivers in a nonhealthcare setting may have close contact with a person with symptomatic, laboratory-confirmed COVID-19 or a person under investigation. Close contacts should monitor their health; they should call their healthcare provider right away if they develop symptoms suggestive of COVID-19 (e.g., fever, cough, shortness of breath).    Close contacts  should also follow these recommendations:   Make sure that you understand and can help the patient follow their healthcare provider's instructions for medication(s) and care. You should help the patient with basic needs in the home and provide support for getting groceries, prescriptions, and other personal needs.   Monitor the patient's symptoms. If the patient is getting sicker, call his or her healthcare provider and tell them that the patient has laboratory-confirmed COVID-19. This will help the healthcare provider's office take steps to keep other people in the office or waiting room from getting infected. Ask the healthcare provider to call the local or [facility] health department for additional guidance. If the patient has a medical emergency and you need to call 911, notify the dispatch personnel that the patient has, or is being evaluated for COVID-19.   Household members should stay in another room or be  from the patient as much as possible. Household members should use a separate bedroom and bathroom, if available.   Prohibit visitors who do not have an essential need to be in the home.   Household members should care for any pets in the home. Do not handle pets or other animals while sick.   Make sure that shared spaces in the home have good air flow, such as by an air conditioner or an opened window, weather permitting.   Perform hand hygiene frequently. Wash your hands often with soap and water for at least 20 seconds or use an alcohol-based hand  that contains 60 to 95% alcohol, covering all surfaces of your hands and rubbing them together until they feel dry. Soap and water should be used preferentially if hands are visibly dirty.   Avoid touching your eyes, nose, and mouth with unwashed hands.   The patient should wear a facemask when you are around other people. If the patient is not able to wear a facemask (for example, because it causes trouble breathing), you, as the  caregiver should wear a mask when you are in the same room as the patient.   Wear a disposable facemask and gloves when you touch or have contact with the patient's blood, stool, or body fluids, such as saliva, sputum, nasal mucus, vomit, urine.  o Throw out disposable facemasks and gloves after using them. Do not reuse.  o When removing personal protective equipment, first remove and dispose of gloves. Then, immediately clean your hands with soap and water or alcohol-based hand . Next, remove and dispose of facemask, and immediately clean your hands again with soap and water or alcohol-based hand .   Avoid sharing household items with the patient. You should not share dishes, drinking glasses, cups, eating utensils, towels, bedding, or other items. After the patient uses these items, you should wash them thoroughly (see below Wash laundry thoroughly).   Clean all high-touch surfaces, such as counters, tabletops, doorknobs, bathroom fixtures, toilets, phones, keyboards, tablets, and bedside tables, every day. Also, clean any surfaces that may have blood, stool, or body fluids on them.   Use a household cleaning spray or wipe, according to the label instructions. Labels contain instructions for safe and effective use of the cleaning product including precautions you should take when applying the product, such as wearing gloves and making sure you have good ventilation during use of the product.   Wash laundry thoroughly.  o Immediately remove and wash clothes or bedding that have blood, stool, or body fluids on them.  o Wear disposable gloves while handling soiled items and keep soiled items away from your body. Clean your hands (with soap and water or an alcohol-based hand ) immediately after removing your gloves.  o Read and follow directions on labels of laundry or clothing items and detergent. In general, using a normal laundry detergent according to washing machine  instructions and dry thoroughly using the warmest temperatures recommended on the clothing label.   Place all used disposable gloves, facemasks, and other contaminated items in a lined container before disposing of them with other household waste. Clean your hands (with soap and water or an alcohol-based hand ) immediately after handling these items. Soap and water should be used preferentially if hands are visibly dirty.   Discuss any additional questions with your state or local health department or healthcare provider. Check available hours when contacting your local health department.    For more information see CDC link below.      https://www.cdc.gov/coronavirus/2019-ncov/hcp/guidance-prevent-spread.html#precautions              If your symptoms worsen or if you have any other concerns, please contact [facility] at [phone].     Sincerely,     Juarez Santana PA-C

## 2020-04-03 LAB — FERRITIN SERPL-MCNC: 698 NG/ML (ref 20–300)

## 2020-04-06 LAB
BACTERIA BLD CULT: ABNORMAL

## 2020-04-06 NOTE — PHYSICIAN QUERY
PT Name: Yoandy Forde  MR #: [id number]    Physician Query Form - Cause and Effect Relationship Clarification      CDS/: Noe Villegas               Contact information:    This form is a permanent document in the medical record.     Query Date: April 6, 2020    By submitting this query, we are merely seeking further clarification of documentation. Please utilize your independent clinical judgment when addressing the question(s) below.    The Medical record contains the following:  Supporting Clinical Findings   Location in record                                                                    COVID                                                                                                                         ED provider note                                                                     Acute bronchitis                                                                                                                           ED provider note         Provider, please clarify if there is any correlation between ___acute bronchitis____ and ____COVID______________.           Are the conditions:      [ X ] Due to or associated with each other   [  ] Unrelated to each other   [  ] Other (Please Specify): _________________________   [  ] Clinically Undetermined

## 2020-04-07 ENCOUNTER — TELEPHONE (OUTPATIENT)
Dept: EMERGENCY MEDICINE | Facility: HOSPITAL | Age: 45
End: 2020-04-07

## 2020-04-07 ENCOUNTER — PATIENT MESSAGE (OUTPATIENT)
Dept: INTERNAL MEDICINE | Facility: CLINIC | Age: 45
End: 2020-04-07

## 2020-04-07 LAB — BACTERIA BLD CULT: NORMAL

## 2020-04-09 ENCOUNTER — TELEPHONE (OUTPATIENT)
Dept: INTERNAL MEDICINE | Facility: CLINIC | Age: 45
End: 2020-04-09

## 2020-04-09 ENCOUNTER — PATIENT MESSAGE (OUTPATIENT)
Dept: INTERNAL MEDICINE | Facility: CLINIC | Age: 45
End: 2020-04-09

## 2020-04-09 NOTE — TELEPHONE ENCOUNTER
----- Message from Gen Thomas sent at 4/9/2020  2:15 PM CDT -----  Contact: PT   Type:  Patient Returning Call    Who Called:Pt   Who Left Message for Patient:Kim   Does the patient know what this is regarding?: yes   Would the patient rather a call back or a response via [facility]? Call back   Best Call Back Number: [phone] (home)   Additional Information: The patient is calling in regards to having questions about his current COVID-19 symptoms, please advise.

## 2020-04-10 ENCOUNTER — PATIENT MESSAGE (OUTPATIENT)
Dept: INTERNAL MEDICINE | Facility: CLINIC | Age: 45
End: 2020-04-10

## 2020-04-13 ENCOUNTER — PATIENT MESSAGE (OUTPATIENT)
Dept: INTERNAL MEDICINE | Facility: CLINIC | Age: 45
End: 2020-04-13

## 2020-05-01 ENCOUNTER — PATIENT MESSAGE (OUTPATIENT)
Dept: INTERNAL MEDICINE | Facility: CLINIC | Age: 45
End: 2020-05-01

## 2020-05-01 ENCOUNTER — OFFICE VISIT (OUTPATIENT)
Dept: INTERNAL MEDICINE | Facility: CLINIC | Age: 45
End: 2020-05-01
Payer: COMMERCIAL

## 2020-05-01 ENCOUNTER — TELEPHONE (OUTPATIENT)
Dept: INTERNAL MEDICINE | Facility: CLINIC | Age: 45
End: 2020-05-01

## 2020-05-01 DIAGNOSIS — U07.1 COVID-19 VIRUS DETECTED: ICD-10-CM

## 2020-05-01 DIAGNOSIS — R05.8 COUGH WITH EXPECTORATION: Primary | ICD-10-CM

## 2020-05-01 PROCEDURE — 99214 OFFICE O/P EST MOD 30 MIN: CPT | Mod: 95,,, | Performed by: FAMILY MEDICINE

## 2020-05-01 PROCEDURE — 99214 PR OFFICE/OUTPT VISIT, EST, LEVL IV, 30-39 MIN: ICD-10-PCS | Mod: 95,,, | Performed by: FAMILY MEDICINE

## 2020-05-01 RX ORDER — BENZONATATE 200 MG/1
200 CAPSULE ORAL 3 TIMES DAILY PRN
Qty: 30 CAPSULE | Refills: 0 | Status: SHIPPED | OUTPATIENT
Start: 2020-05-01 | End: 2020-05-11

## 2020-05-01 NOTE — TELEPHONE ENCOUNTER
Spoke with pt regarding him having a cough and green mucus offer pt a virtual visit pt accept.//LB

## 2020-05-01 NOTE — PROGRESS NOTES
The patient location is: Home  The chief complaint leading to consultation is: Cough  Visit type: audiovisual  Total time spent with patient: 10 mins  Each patient to whom he or she provides medical services by telemedicine is:  (1) informed of the relationship between the physician and patient and the respective role of any other health care provider with respect to management of the patient; and (2) notified that he or she may decline to receive medical services by telemedicine and may withdraw from such care at any time.    Notes:   Subjective:       Patient ID: Yoandy Forde is a 45 y.o. male.    Chief Complaint: No chief complaint on file.    45-year-old  male patient with Patient Active Problem List:     Dermatophytosis of other specified sites     Undifferentiated connective tissue disease     Essential hypertension     Hemorrhoids     GERD (gastroesophageal reflux disease)     Sjogren's syndrome     Medication monitoring encounter     Tobacco abuse     Vitamin D deficiency     Severe obesity (BMI 35.0-39.9) with comorbidity  Reports dry to productive cough off and on for the past 1-2 weeks especially in the evenings, with minimal sinus drip.  Reports that he stopped smoking.  Denies any wheezing or difficulty breathing, fever with chills.   Patient was positive for COVID 03/31/2020.  Denies any feeling tired or fatigued.    Review of Systems   Constitutional: Negative for chills and fever.   HENT: Positive for postnasal drip. Negative for congestion.    Respiratory: Positive for cough. Negative for shortness of breath and wheezing.    Cardiovascular: Negative for chest pain and palpitations.   Musculoskeletal: Negative for myalgias.         There were no vitals taken for this visit.  Objective:      Physical Exam   Constitutional: He is oriented to person, place, and time. He appears well-developed and well-nourished.   Pulmonary/Chest: No respiratory distress.   Neurological: He is alert and  oriented to person, place, and time.   Psychiatric: He has a normal mood and affect.         Assessment/Plan:   1. Cough with expectoration  - benzonatate (TESSALON) 200 MG capsule; Take 1 capsule (200 mg total) by mouth 3 (three) times daily as needed for Cough.  Dispense: 30 capsule; Refill: 0    Tessalon Perles prescribed today for symptomatic relief.   Encouraged to drink adequate fluids  Advised to start taking over-the-counter Zyrtec for postnasal drip/allergies.  Reviewed previous chest x-ray which was stable     2. COVID-19 virus detected  Patient was positive on 03/31/2020.

## 2020-05-07 ENCOUNTER — PATIENT MESSAGE (OUTPATIENT)
Dept: INTERNAL MEDICINE | Facility: CLINIC | Age: 45
End: 2020-05-07

## 2020-05-13 ENCOUNTER — PATIENT OUTREACH (OUTPATIENT)
Dept: ADMINISTRATIVE | Facility: OTHER | Age: 45
End: 2020-05-13

## 2020-05-18 ENCOUNTER — LAB VISIT (OUTPATIENT)
Dept: LAB | Facility: HOSPITAL | Age: 45
End: 2020-05-18
Attending: INTERNAL MEDICINE
Payer: COMMERCIAL

## 2020-05-18 DIAGNOSIS — L93.2 CUTANEOUS LUPUS ERYTHEMATOSUS: ICD-10-CM

## 2020-05-18 LAB
ALBUMIN SERPL BCP-MCNC: 4.5 G/DL (ref 3.5–5.2)
ALP SERPL-CCNC: 61 U/L (ref 55–135)
ALT SERPL W/O P-5'-P-CCNC: 24 U/L (ref 10–44)
ANION GAP SERPL CALC-SCNC: 11 MMOL/L (ref 8–16)
AST SERPL-CCNC: 27 U/L (ref 10–40)
BASOPHILS # BLD AUTO: 0.05 K/UL (ref 0–0.2)
BASOPHILS NFR BLD: 0.8 % (ref 0–1.9)
BILIRUB SERPL-MCNC: 0.2 MG/DL (ref 0.1–1)
BUN SERPL-MCNC: 17 MG/DL (ref 6–20)
CALCIUM SERPL-MCNC: 9.7 MG/DL (ref 8.7–10.5)
CHLORIDE SERPL-SCNC: 105 MMOL/L (ref 95–110)
CO2 SERPL-SCNC: 25 MMOL/L (ref 23–29)
CREAT SERPL-MCNC: 1.5 MG/DL (ref 0.5–1.4)
CRP SERPL-MCNC: 2.7 MG/L (ref 0–8.2)
DIFFERENTIAL METHOD: NORMAL
EOSINOPHIL # BLD AUTO: 0.3 K/UL (ref 0–0.5)
EOSINOPHIL NFR BLD: 4.1 % (ref 0–8)
ERYTHROCYTE [DISTWIDTH] IN BLOOD BY AUTOMATED COUNT: 14 % (ref 11.5–14.5)
EST. GFR  (AFRICAN AMERICAN): >60 ML/MIN/1.73 M^2
EST. GFR  (NON AFRICAN AMERICAN): 55 ML/MIN/1.73 M^2
GLUCOSE SERPL-MCNC: 95 MG/DL (ref 70–110)
HCT VFR BLD AUTO: 41.8 % (ref 40–54)
HGB BLD-MCNC: 14.2 G/DL (ref 14–18)
IMM GRANULOCYTES # BLD AUTO: 0.01 K/UL (ref 0–0.04)
IMM GRANULOCYTES NFR BLD AUTO: 0.2 % (ref 0–0.5)
LYMPHOCYTES # BLD AUTO: 2.6 K/UL (ref 1–4.8)
LYMPHOCYTES NFR BLD: 43 % (ref 18–48)
MCH RBC QN AUTO: 30.9 PG (ref 27–31)
MCHC RBC AUTO-ENTMCNC: 34 G/DL (ref 32–36)
MCV RBC AUTO: 91 FL (ref 82–98)
MONOCYTES # BLD AUTO: 0.7 K/UL (ref 0.3–1)
MONOCYTES NFR BLD: 11.1 % (ref 4–15)
NEUTROPHILS # BLD AUTO: 2.5 K/UL (ref 1.8–7.7)
NEUTROPHILS NFR BLD: 41 % (ref 38–73)
NRBC BLD-RTO: 0 /100 WBC
PLATELET # BLD AUTO: 258 K/UL (ref 150–350)
PMV BLD AUTO: 10.1 FL (ref 9.2–12.9)
POTASSIUM SERPL-SCNC: 3.9 MMOL/L (ref 3.5–5.1)
PROT SERPL-MCNC: 7.6 G/DL (ref 6–8.4)
RBC # BLD AUTO: 4.6 M/UL (ref 4.6–6.2)
SODIUM SERPL-SCNC: 141 MMOL/L (ref 136–145)
WBC # BLD AUTO: 6.03 K/UL (ref 3.9–12.7)

## 2020-05-18 PROCEDURE — 86160 COMPLEMENT ANTIGEN: CPT

## 2020-05-18 PROCEDURE — 36415 COLL VENOUS BLD VENIPUNCTURE: CPT

## 2020-05-18 PROCEDURE — 86160 COMPLEMENT ANTIGEN: CPT | Mod: 59

## 2020-05-18 PROCEDURE — 86140 C-REACTIVE PROTEIN: CPT

## 2020-05-18 PROCEDURE — 86225 DNA ANTIBODY NATIVE: CPT

## 2020-05-18 PROCEDURE — 85652 RBC SED RATE AUTOMATED: CPT

## 2020-05-18 PROCEDURE — 80053 COMPREHEN METABOLIC PANEL: CPT

## 2020-05-18 PROCEDURE — 85025 COMPLETE CBC W/AUTO DIFF WBC: CPT

## 2020-05-19 LAB
C3 SERPL-MCNC: 135 MG/DL (ref 50–180)
C4 SERPL-MCNC: 37 MG/DL (ref 11–44)
ERYTHROCYTE [SEDIMENTATION RATE] IN BLOOD BY WESTERGREN METHOD: <2 MM/HR (ref 0–23)

## 2020-05-20 LAB — DSDNA AB SER-ACNC: NORMAL [IU]/ML

## 2020-05-25 ENCOUNTER — TELEPHONE (OUTPATIENT)
Dept: RHEUMATOLOGY | Facility: CLINIC | Age: 45
End: 2020-05-25

## 2020-05-25 NOTE — TELEPHONE ENCOUNTER
Called pt to advise of Dr. Randhawa's note. No answer, left msg & number to return call with any questions.

## 2020-05-25 NOTE — TELEPHONE ENCOUNTER
----- Message from Natasha Barone sent at 5/25/2020  9:06 AM CDT -----  Contact: Patient  .Type:  Patient Returning Call    Who Called Patient  Who Left Message for Patient: Priyanka  Does the patient know what this is regarding?: missed call  Would the patient rather a call back or a response via [facility]? call  Best Call Back Number: .[phone]    Additional Information:

## 2020-05-25 NOTE — TELEPHONE ENCOUNTER
----- Message from Lissette Randhawa MD sent at 5/22/2020  1:05 PM CDT -----  Please let him know all his labs look good

## 2020-05-25 NOTE — TELEPHONE ENCOUNTER
Returned call to pt, advised of result note, pt verbalized understanding. States wants Dr. Randhawa to know that he is having left elbow pain & knee pain that disturbs sleep. Please advise

## 2020-05-27 NOTE — TELEPHONE ENCOUNTER
Called him   Pain stable no swelling  He started taking Tylenol, if persistent he would let me know

## 2020-05-28 ENCOUNTER — PATIENT MESSAGE (OUTPATIENT)
Dept: RHEUMATOLOGY | Facility: CLINIC | Age: 45
End: 2020-05-28

## 2020-05-28 ENCOUNTER — PATIENT MESSAGE (OUTPATIENT)
Dept: INTERNAL MEDICINE | Facility: CLINIC | Age: 45
End: 2020-05-28

## 2020-05-29 RX ORDER — LISINOPRIL AND HYDROCHLOROTHIAZIDE 12.5; 2 MG/1; MG/1
TABLET ORAL
Qty: 90 TABLET | Refills: 0 | Status: SHIPPED | OUTPATIENT
Start: 2020-05-29 | End: 2020-06-12 | Stop reason: SDUPTHER

## 2020-07-16 ENCOUNTER — OFFICE VISIT (OUTPATIENT)
Dept: INTERNAL MEDICINE | Facility: CLINIC | Age: 45
End: 2020-07-16
Payer: COMMERCIAL

## 2020-07-16 ENCOUNTER — TELEPHONE (OUTPATIENT)
Dept: INTERNAL MEDICINE | Facility: CLINIC | Age: 45
End: 2020-07-16

## 2020-07-16 DIAGNOSIS — F41.8 ANXIETY ASSOCIATED WITH DEPRESSION: ICD-10-CM

## 2020-07-16 DIAGNOSIS — M25.532 LEFT WRIST PAIN: ICD-10-CM

## 2020-07-16 DIAGNOSIS — I10 ESSENTIAL HYPERTENSION: Primary | ICD-10-CM

## 2020-07-16 PROCEDURE — 99214 PR OFFICE/OUTPT VISIT, EST, LEVL IV, 30-39 MIN: ICD-10-PCS | Mod: 95,,, | Performed by: FAMILY MEDICINE

## 2020-07-16 PROCEDURE — 99214 OFFICE O/P EST MOD 30 MIN: CPT | Mod: 95,,, | Performed by: FAMILY MEDICINE

## 2020-07-16 RX ORDER — AMLODIPINE BESYLATE 5 MG/1
5 TABLET ORAL DAILY
Qty: 30 TABLET | Refills: 11 | Status: SHIPPED | OUTPATIENT
Start: 2020-07-16 | End: 2020-08-04 | Stop reason: SDUPTHER

## 2020-07-16 RX ORDER — METHYLPREDNISOLONE 4 MG/1
TABLET ORAL
Qty: 1 PACKAGE | Refills: 0 | Status: SHIPPED | OUTPATIENT
Start: 2020-07-16 | End: 2021-03-03

## 2020-07-16 RX ORDER — BUPROPION HYDROCHLORIDE 100 MG/1
100 TABLET ORAL 2 TIMES DAILY
Qty: 60 TABLET | Refills: 11 | Status: SHIPPED | OUTPATIENT
Start: 2020-07-16 | End: 2021-04-13

## 2020-07-16 NOTE — PROGRESS NOTES
The patient location is: Home  The chief complaint leading to consultation is:  Cough    Visit type: audiovisual    Face to Face time with patient: 15 minutes of total time spent on the encounter, which includes face to face time and non-face to face time preparing to see the patient (eg, review of tests), Obtaining and/or reviewing separately obtained history, Documenting clinical information in the electronic or other health record, Independently interpreting results (not separately reported) and communicating results to the patient/family/caregiver, or Care coordination (not separately reported).         Each patient to whom he or she provides medical services by telemedicine is:  (1) informed of the relationship between the physician and patient and the respective role of any other health care provider with respect to management of the patient; and (2) notified that he or she may decline to receive medical services by telemedicine and may withdraw from such care at any time.    Notes:     Subjective:       Patient ID: Yoandy Forde is a 45 y.o. male.    Chief Complaint: No chief complaint on file.    45-year-old  male patient with Patient Active Problem List:     Dermatophytosis of other specified sites     Undifferentiated connective tissue disease     Essential hypertension     Hemorrhoids     GERD (gastroesophageal reflux disease)     Sjogren's syndrome     Medication monitoring encounter     Tobacco abuse     Vitamin D deficiency     Severe obesity (BMI 35.0-39.9) with comorbidity  Here reports that patient has been having persistent and occasionally has been having uncomfortable sensation to his tongue .   Patient has been having mild anxiety for which he has been taking Wellbutrin 75 mg twice daily but continues to have anxiety and having difficulty sleeping  Denies any chest pain or difficulty breathing  Patient also reports having left wrist pain off and on lately and has been taking  over-the-counter Aleve with some relief.  Reports pain up to 4 to 5/10    Cough  This is a new problem. The current episode started more than 1 month ago. The problem has been unchanged. The problem occurs every few minutes. The cough is non-productive. Associated symptoms include ear congestion, myalgias, nasal congestion and postnasal drip. Pertinent negatives include no chest pain or chills. The symptoms are aggravated by dust, lying down, pollens and stress. He has tried OTC cough suppressant for the symptoms. The treatment provided mild relief. His past medical history is significant for environmental allergies. There is no history of asthma, bronchiectasis, bronchitis, COPD, emphysema or pneumonia.     Review of Systems   Constitutional: Negative for chills.   HENT: Positive for postnasal drip.    Respiratory: Positive for cough.    Cardiovascular: Negative for chest pain.   Musculoskeletal: Positive for arthralgias and myalgias.   Allergic/Immunologic: Positive for environmental allergies.         There were no vitals taken for this visit.  Objective:      Physical Exam  Constitutional:       Appearance: He is well-developed.   HENT:      Head: Normocephalic.   Pulmonary:      Effort: No respiratory distress.   Musculoskeletal:         General: Tenderness present.      Comments: Positive for tenderness to the left wrist medially   Skin:     Findings: No rash.   Neurological:      Mental Status: He is alert and oriented to person, place, and time.           Assessment/Plan:   1. Essential hypertension  - amLODIPine (NORVASC) 5 MG tablet; Take 1 tablet (5 mg total) by mouth once daily.  Dispense: 30 tablet; Refill: 11  - Hypertension Digital Medicine ([facility]) Enrollment Order  - Hypertension Digital Medicine ([facility]): Assign Onboarding Questionnaires  Will discontinue lisinopril hydrochlorothiazide 50/12.5 mg and will start on amlodipine 5 mg  Not sure whether patient's tongue discomfort is due to tongue swelling  and with persistent cough Will discontinue the medication at this time  Will enroll in hypertension digital program and encouraged to monitor blood pressure trends  Restrict salt intake and eat low-fat and low-cholesterol diet    2. Left wrist pain  - methylPREDNISolone (MEDROL DOSEPACK) 4 mg tablet; use as directed  Dispense: 1 Package; Refill: 0  Will start on Medrol Dosepak with some relief  Continue meloxicam as needed    3. Anxiety associated with depression  - buPROPion (WELLBUTRIN) 100 MG tablet; Take 1 tablet (100 mg total) by mouth 2 (two) times daily.  Dispense: 60 tablet; Refill: 11  Will increase Wellbutrin from  mg twice daily    If having ongoing symptoms patient was advised to come into the clinic

## 2020-07-16 NOTE — TELEPHONE ENCOUNTER
----- Message from Bill Oropeza sent at 7/16/2020  4:02 PM CDT -----  Contact: self  Would like to consult with nurse regarding virtual appt.  Pt states he is online and waiting on the doctor.  Please contact Yoandy Forde to inform him if the doctor is running late ([phone]).  Thanks/As

## 2020-08-04 DIAGNOSIS — I10 ESSENTIAL HYPERTENSION: ICD-10-CM

## 2020-08-04 RX ORDER — AMLODIPINE BESYLATE 10 MG/1
10 TABLET ORAL DAILY
Qty: 90 TABLET | Refills: 1 | Status: SHIPPED | OUTPATIENT
Start: 2020-08-04 | End: 2021-03-12 | Stop reason: SDUPTHER

## 2020-08-04 NOTE — TELEPHONE ENCOUNTER
----- Message from Vivian Kamara sent at 8/4/2020  1:08 PM CDT -----  Regarding: refill  Pt is requesting call back in regards to questions about changing quanitity of medication due to taking extra dose per provider's instructions          Medication:  amLODIPine (NORVASC) 5 MG tablet          Pls call back at [phone]

## 2020-08-05 ENCOUNTER — PATIENT MESSAGE (OUTPATIENT)
Dept: RHEUMATOLOGY | Facility: CLINIC | Age: 45
End: 2020-08-05

## 2020-08-26 ENCOUNTER — TELEPHONE (OUTPATIENT)
Dept: INTERNAL MEDICINE | Facility: CLINIC | Age: 45
End: 2020-08-26

## 2020-08-26 NOTE — TELEPHONE ENCOUNTER
----- Message from Loli Barnes sent at 8/26/2020 11:34 AM CDT -----  Contact: FREDRICK  Would like to consult with nurse about his blood pressure med was taking 20 mg but was suppose be 10 mg please call back  [phone]

## 2020-08-26 NOTE — TELEPHONE ENCOUNTER
Spoke with pt . Pt accidentally took 20 mg instead of 10 mg for 15 days. Pt was advised to monitor BP trends and to take 1 tablet of 10 mg pill once daily. Pt states he is well but was concerned because he was taking too much of his BP medication. Pt was advised as long as his BP is normal and he is not having any lightheadedness, fatigue, chest pains, etc he should be fine. Pt denied any problems after taking double dose of Bp med. Pt acknowledged understanding and  was thankful for the call back. Call ended well.

## 2020-09-08 DIAGNOSIS — N52.9 ERECTILE DYSFUNCTION, UNSPECIFIED ERECTILE DYSFUNCTION TYPE: Primary | ICD-10-CM

## 2020-09-08 RX ORDER — TADALAFIL 10 MG/1
10 TABLET ORAL DAILY PRN
Qty: 15 TABLET | Refills: 0 | Status: SHIPPED | OUTPATIENT
Start: 2020-09-08 | End: 2020-09-27 | Stop reason: SDUPTHER

## 2020-09-14 ENCOUNTER — TELEPHONE (OUTPATIENT)
Dept: INTERNAL MEDICINE | Facility: CLINIC | Age: 45
End: 2020-09-14

## 2020-09-14 DIAGNOSIS — R05.3 PERSISTENT COUGH: Primary | ICD-10-CM

## 2020-09-22 ENCOUNTER — PATIENT MESSAGE (OUTPATIENT)
Dept: INTERNAL MEDICINE | Facility: CLINIC | Age: 45
End: 2020-09-22

## 2020-09-29 DIAGNOSIS — N52.9 ERECTILE DYSFUNCTION, UNSPECIFIED ERECTILE DYSFUNCTION TYPE: ICD-10-CM

## 2020-09-29 RX ORDER — TADALAFIL 10 MG/1
10 TABLET ORAL DAILY PRN
Qty: 15 TABLET | Refills: 0 | Status: CANCELLED | OUTPATIENT
Start: 2020-09-29 | End: 2021-09-29

## 2020-10-01 ENCOUNTER — PATIENT MESSAGE (OUTPATIENT)
Dept: INTERNAL MEDICINE | Facility: CLINIC | Age: 45
End: 2020-10-01

## 2020-10-01 DIAGNOSIS — N52.9 ERECTILE DYSFUNCTION, UNSPECIFIED ERECTILE DYSFUNCTION TYPE: ICD-10-CM

## 2020-10-01 RX ORDER — TADALAFIL 10 MG/1
10 TABLET ORAL DAILY PRN
Qty: 15 TABLET | Refills: 0 | Status: CANCELLED | OUTPATIENT
Start: 2020-10-01 | End: 2021-10-01

## 2020-10-08 ENCOUNTER — PATIENT MESSAGE (OUTPATIENT)
Dept: INTERNAL MEDICINE | Facility: CLINIC | Age: 45
End: 2020-10-08

## 2020-10-10 ENCOUNTER — PATIENT MESSAGE (OUTPATIENT)
Dept: INTERNAL MEDICINE | Facility: CLINIC | Age: 45
End: 2020-10-10

## 2020-10-26 DIAGNOSIS — L93.2 CUTANEOUS LUPUS ERYTHEMATOSUS: ICD-10-CM

## 2020-10-26 RX ORDER — HYDROXYCHLOROQUINE SULFATE 200 MG/1
200 TABLET, FILM COATED ORAL 2 TIMES DAILY
Qty: 60 TABLET | Refills: 5 | Status: SHIPPED | OUTPATIENT
Start: 2020-10-26 | End: 2020-12-06 | Stop reason: SDUPTHER

## 2020-10-26 RX ORDER — HYDROXYCHLOROQUINE SULFATE 200 MG/1
200 TABLET, FILM COATED ORAL 2 TIMES DAILY
Qty: 60 TABLET | Refills: 5 | Status: CANCELLED | OUTPATIENT
Start: 2020-10-26

## 2020-11-07 DIAGNOSIS — L93.2 CUTANEOUS LUPUS ERYTHEMATOSUS: ICD-10-CM

## 2020-11-07 RX ORDER — HYDROXYCHLOROQUINE SULFATE 200 MG/1
200 TABLET, FILM COATED ORAL 2 TIMES DAILY
Qty: 60 TABLET | Refills: 5 | Status: CANCELLED | OUTPATIENT
Start: 2020-11-07

## 2020-11-09 NOTE — TELEPHONE ENCOUNTER
rcvd refill req today for plaquenil, sys shows refill sent through on 10/26/20- called clarita hayes to [facility] filed & pt picked up 10/26/20.

## 2020-11-19 ENCOUNTER — PATIENT MESSAGE (OUTPATIENT)
Dept: RHEUMATOLOGY | Facility: CLINIC | Age: 45
End: 2020-11-19

## 2020-11-20 DIAGNOSIS — M25.559 HIP PAIN: Primary | ICD-10-CM

## 2020-11-20 RX ORDER — METHOCARBAMOL 750 MG/1
750 TABLET, FILM COATED ORAL 2 TIMES DAILY PRN
Qty: 40 TABLET | Refills: 0 | Status: SHIPPED | OUTPATIENT
Start: 2020-11-20 | End: 2021-03-03

## 2020-12-06 DIAGNOSIS — L93.2 CUTANEOUS LUPUS ERYTHEMATOSUS: ICD-10-CM

## 2020-12-07 RX ORDER — HYDROXYCHLOROQUINE SULFATE 200 MG/1
200 TABLET, FILM COATED ORAL 2 TIMES DAILY
Qty: 60 TABLET | Refills: 2 | Status: SHIPPED | OUTPATIENT
Start: 2020-12-07 | End: 2021-01-23 | Stop reason: SDUPTHER

## 2021-01-03 ENCOUNTER — PATIENT MESSAGE (OUTPATIENT)
Dept: INTERNAL MEDICINE | Facility: CLINIC | Age: 46
End: 2021-01-03

## 2021-01-07 ENCOUNTER — PATIENT MESSAGE (OUTPATIENT)
Dept: INTERNAL MEDICINE | Facility: CLINIC | Age: 46
End: 2021-01-07

## 2021-01-23 DIAGNOSIS — N52.9 ERECTILE DYSFUNCTION, UNSPECIFIED ERECTILE DYSFUNCTION TYPE: ICD-10-CM

## 2021-01-23 RX ORDER — TADALAFIL 10 MG/1
10 TABLET ORAL DAILY PRN
Qty: 15 TABLET | Refills: 0 | Status: CANCELLED | OUTPATIENT
Start: 2021-01-23 | End: 2022-01-23

## 2021-01-25 ENCOUNTER — TELEPHONE (OUTPATIENT)
Dept: INTERNAL MEDICINE | Facility: CLINIC | Age: 46
End: 2021-01-25

## 2021-03-02 ENCOUNTER — PATIENT OUTREACH (OUTPATIENT)
Dept: ADMINISTRATIVE | Facility: OTHER | Age: 46
End: 2021-03-02

## 2021-03-03 ENCOUNTER — LAB VISIT (OUTPATIENT)
Dept: LAB | Facility: HOSPITAL | Age: 46
End: 2021-03-03
Attending: INTERNAL MEDICINE
Payer: COMMERCIAL

## 2021-03-03 ENCOUNTER — OFFICE VISIT (OUTPATIENT)
Dept: RHEUMATOLOGY | Facility: CLINIC | Age: 46
End: 2021-03-03
Payer: COMMERCIAL

## 2021-03-03 VITALS
HEART RATE: 85 BPM | DIASTOLIC BLOOD PRESSURE: 85 MMHG | SYSTOLIC BLOOD PRESSURE: 137 MMHG | WEIGHT: 238.13 LBS | BODY MASS INDEX: 33.21 KG/M2

## 2021-03-03 DIAGNOSIS — M35.01 SJOGREN'S SYNDROME WITH KERATOCONJUNCTIVITIS SICCA: Primary | ICD-10-CM

## 2021-03-03 DIAGNOSIS — L93.2 CUTANEOUS LUPUS ERYTHEMATOSUS: ICD-10-CM

## 2021-03-03 DIAGNOSIS — M54.9 UPPER BACK PAIN: ICD-10-CM

## 2021-03-03 DIAGNOSIS — Z72.0 TOBACCO ABUSE: ICD-10-CM

## 2021-03-03 DIAGNOSIS — B35.3 ATHLETE'S FOOT ON RIGHT: ICD-10-CM

## 2021-03-03 DIAGNOSIS — D64.9 MILD ANEMIA: ICD-10-CM

## 2021-03-03 LAB
ALBUMIN SERPL BCP-MCNC: 4.2 G/DL (ref 3.5–5.2)
ALP SERPL-CCNC: 64 U/L (ref 55–135)
ALT SERPL W/O P-5'-P-CCNC: 25 U/L (ref 10–44)
ANION GAP SERPL CALC-SCNC: 9 MMOL/L (ref 8–16)
AST SERPL-CCNC: 31 U/L (ref 10–40)
BASOPHILS # BLD AUTO: 0.05 K/UL (ref 0–0.2)
BASOPHILS NFR BLD: 1 % (ref 0–1.9)
BILIRUB SERPL-MCNC: 0.4 MG/DL (ref 0.1–1)
BILIRUB UR QL STRIP: NEGATIVE
BUN SERPL-MCNC: 16 MG/DL (ref 6–20)
CALCIUM SERPL-MCNC: 8.9 MG/DL (ref 8.7–10.5)
CHLORIDE SERPL-SCNC: 107 MMOL/L (ref 95–110)
CLARITY UR: CLEAR
CO2 SERPL-SCNC: 24 MMOL/L (ref 23–29)
COLOR UR: YELLOW
CREAT SERPL-MCNC: 1.1 MG/DL (ref 0.5–1.4)
CRP SERPL-MCNC: 3.4 MG/L (ref 0–8.2)
DIFFERENTIAL METHOD: ABNORMAL
EOSINOPHIL # BLD AUTO: 0.3 K/UL (ref 0–0.5)
EOSINOPHIL NFR BLD: 5 % (ref 0–8)
ERYTHROCYTE [DISTWIDTH] IN BLOOD BY AUTOMATED COUNT: 13.6 % (ref 11.5–14.5)
ERYTHROCYTE [SEDIMENTATION RATE] IN BLOOD BY WESTERGREN METHOD: 3 MM/HR (ref 0–23)
EST. GFR  (AFRICAN AMERICAN): >60 ML/MIN/1.73 M^2
EST. GFR  (NON AFRICAN AMERICAN): >60 ML/MIN/1.73 M^2
GLUCOSE SERPL-MCNC: 96 MG/DL (ref 70–110)
GLUCOSE UR QL STRIP: NEGATIVE
HCT VFR BLD AUTO: 39.6 % (ref 40–54)
HGB BLD-MCNC: 13.6 G/DL (ref 14–18)
HGB UR QL STRIP: NEGATIVE
IMM GRANULOCYTES # BLD AUTO: 0.01 K/UL (ref 0–0.04)
IMM GRANULOCYTES NFR BLD AUTO: 0.2 % (ref 0–0.5)
KETONES UR QL STRIP: NEGATIVE
LEUKOCYTE ESTERASE UR QL STRIP: NEGATIVE
LYMPHOCYTES # BLD AUTO: 1.9 K/UL (ref 1–4.8)
LYMPHOCYTES NFR BLD: 38.1 % (ref 18–48)
MCH RBC QN AUTO: 31.2 PG (ref 27–31)
MCHC RBC AUTO-ENTMCNC: 34.3 G/DL (ref 32–36)
MCV RBC AUTO: 91 FL (ref 82–98)
MONOCYTES # BLD AUTO: 0.5 K/UL (ref 0.3–1)
MONOCYTES NFR BLD: 10.9 % (ref 4–15)
NEUTROPHILS # BLD AUTO: 2.2 K/UL (ref 1.8–7.7)
NEUTROPHILS NFR BLD: 44.8 % (ref 38–73)
NITRITE UR QL STRIP: NEGATIVE
NRBC BLD-RTO: 0 /100 WBC
PH UR STRIP: 7 [PH] (ref 5–8)
PLATELET # BLD AUTO: 240 K/UL (ref 150–350)
PMV BLD AUTO: 9.9 FL (ref 9.2–12.9)
POTASSIUM SERPL-SCNC: 4.1 MMOL/L (ref 3.5–5.1)
PROT SERPL-MCNC: 7.1 G/DL (ref 6–8.4)
PROT UR QL STRIP: NEGATIVE
RBC # BLD AUTO: 4.36 M/UL (ref 4.6–6.2)
SODIUM SERPL-SCNC: 140 MMOL/L (ref 136–145)
SP GR UR STRIP: 1.01 (ref 1–1.03)
URN SPEC COLLECT METH UR: NORMAL
WBC # BLD AUTO: 4.96 K/UL (ref 3.9–12.7)

## 2021-03-03 PROCEDURE — 3079F DIAST BP 80-89 MM HG: CPT | Mod: CPTII,S$GLB,, | Performed by: INTERNAL MEDICINE

## 2021-03-03 PROCEDURE — 99214 OFFICE O/P EST MOD 30 MIN: CPT | Mod: S$GLB,,, | Performed by: INTERNAL MEDICINE

## 2021-03-03 PROCEDURE — 3079F PR MOST RECENT DIASTOLIC BLOOD PRESSURE 80-89 MM HG: ICD-10-PCS | Mod: CPTII,S$GLB,, | Performed by: INTERNAL MEDICINE

## 2021-03-03 PROCEDURE — 99999 PR PBB SHADOW E&M-EST. PATIENT-LVL IV: CPT | Mod: PBBFAC,,, | Performed by: INTERNAL MEDICINE

## 2021-03-03 PROCEDURE — 99214 PR OFFICE/OUTPT VISIT, EST, LEVL IV, 30-39 MIN: ICD-10-PCS | Mod: S$GLB,,, | Performed by: INTERNAL MEDICINE

## 2021-03-03 PROCEDURE — 86140 C-REACTIVE PROTEIN: CPT

## 2021-03-03 PROCEDURE — 3075F PR MOST RECENT SYSTOLIC BLOOD PRESS GE 130-139MM HG: ICD-10-PCS | Mod: CPTII,S$GLB,, | Performed by: INTERNAL MEDICINE

## 2021-03-03 PROCEDURE — 80053 COMPREHEN METABOLIC PANEL: CPT

## 2021-03-03 PROCEDURE — 99999 PR PBB SHADOW E&M-EST. PATIENT-LVL IV: ICD-10-PCS | Mod: PBBFAC,,, | Performed by: INTERNAL MEDICINE

## 2021-03-03 PROCEDURE — 86160 COMPLEMENT ANTIGEN: CPT | Performed by: INTERNAL MEDICINE

## 2021-03-03 PROCEDURE — 1125F PR PAIN SEVERITY QUANTIFIED, PAIN PRESENT: ICD-10-PCS | Mod: S$GLB,,, | Performed by: INTERNAL MEDICINE

## 2021-03-03 PROCEDURE — 81003 URINALYSIS AUTO W/O SCOPE: CPT

## 2021-03-03 PROCEDURE — 3075F SYST BP GE 130 - 139MM HG: CPT | Mod: CPTII,S$GLB,, | Performed by: INTERNAL MEDICINE

## 2021-03-03 PROCEDURE — 86225 DNA ANTIBODY NATIVE: CPT | Performed by: INTERNAL MEDICINE

## 2021-03-03 PROCEDURE — 85025 COMPLETE CBC W/AUTO DIFF WBC: CPT

## 2021-03-03 PROCEDURE — 1125F AMNT PAIN NOTED PAIN PRSNT: CPT | Mod: S$GLB,,, | Performed by: INTERNAL MEDICINE

## 2021-03-03 PROCEDURE — 3008F PR BODY MASS INDEX (BMI) DOCUMENTED: ICD-10-PCS | Mod: CPTII,S$GLB,, | Performed by: INTERNAL MEDICINE

## 2021-03-03 PROCEDURE — 3008F BODY MASS INDEX DOCD: CPT | Mod: CPTII,S$GLB,, | Performed by: INTERNAL MEDICINE

## 2021-03-03 PROCEDURE — 36415 COLL VENOUS BLD VENIPUNCTURE: CPT

## 2021-03-03 PROCEDURE — 85652 RBC SED RATE AUTOMATED: CPT | Performed by: INTERNAL MEDICINE

## 2021-03-03 PROCEDURE — 86160 COMPLEMENT ANTIGEN: CPT | Mod: 59 | Performed by: INTERNAL MEDICINE

## 2021-03-03 RX ORDER — HYDROCODONE BITARTRATE AND ACETAMINOPHEN 5; 325 MG/1; MG/1
TABLET ORAL
COMMUNITY
Start: 2020-12-29 | End: 2021-12-16 | Stop reason: SDUPTHER

## 2021-03-03 RX ORDER — CYCLOBENZAPRINE HCL 10 MG
10 TABLET ORAL NIGHTLY PRN
Qty: 30 TABLET | Refills: 0 | Status: SHIPPED | OUTPATIENT
Start: 2021-03-03 | End: 2021-05-14

## 2021-03-03 RX ORDER — BUPROPION HYDROCHLORIDE 75 MG/1
TABLET ORAL
COMMUNITY
Start: 2020-11-29 | End: 2021-07-26

## 2021-03-03 RX ORDER — METRONIDAZOLE 500 MG/1
TABLET ORAL
COMMUNITY
Start: 2020-12-29 | End: 2021-03-03

## 2021-03-03 RX ORDER — MELOXICAM 15 MG/1
15 TABLET ORAL DAILY
Qty: 7 TABLET | Refills: 0 | Status: SHIPPED | OUTPATIENT
Start: 2021-03-03 | End: 2021-04-13 | Stop reason: SDUPTHER

## 2021-03-04 LAB
C3 SERPL-MCNC: 117 MG/DL (ref 50–180)
C4 SERPL-MCNC: 32 MG/DL (ref 11–44)
DSDNA AB SER-ACNC: NORMAL [IU]/ML

## 2021-03-15 ENCOUNTER — PATIENT OUTREACH (OUTPATIENT)
Dept: ADMINISTRATIVE | Facility: HOSPITAL | Age: 46
End: 2021-03-15

## 2021-03-29 ENCOUNTER — PATIENT MESSAGE (OUTPATIENT)
Dept: INTERNAL MEDICINE | Facility: CLINIC | Age: 46
End: 2021-03-29

## 2021-03-31 ENCOUNTER — TELEPHONE (OUTPATIENT)
Dept: INTERNAL MEDICINE | Facility: CLINIC | Age: 46
End: 2021-03-31

## 2021-04-05 ENCOUNTER — PATIENT MESSAGE (OUTPATIENT)
Dept: INTERNAL MEDICINE | Facility: CLINIC | Age: 46
End: 2021-04-05

## 2021-04-08 ENCOUNTER — PATIENT MESSAGE (OUTPATIENT)
Dept: RHEUMATOLOGY | Facility: CLINIC | Age: 46
End: 2021-04-08

## 2021-04-10 ENCOUNTER — PATIENT MESSAGE (OUTPATIENT)
Dept: RHEUMATOLOGY | Facility: CLINIC | Age: 46
End: 2021-04-10

## 2021-04-10 ENCOUNTER — LAB VISIT (OUTPATIENT)
Dept: LAB | Facility: HOSPITAL | Age: 46
End: 2021-04-10
Attending: INTERNAL MEDICINE
Payer: COMMERCIAL

## 2021-04-10 DIAGNOSIS — L93.2 CUTANEOUS LUPUS ERYTHEMATOSUS: ICD-10-CM

## 2021-04-10 LAB
BASOPHILS # BLD AUTO: 0.04 K/UL (ref 0–0.2)
BASOPHILS NFR BLD: 0.7 % (ref 0–1.9)
DIFFERENTIAL METHOD: ABNORMAL
EOSINOPHIL # BLD AUTO: 0.2 K/UL (ref 0–0.5)
EOSINOPHIL NFR BLD: 4.1 % (ref 0–8)
ERYTHROCYTE [DISTWIDTH] IN BLOOD BY AUTOMATED COUNT: 13.3 % (ref 11.5–14.5)
HCT VFR BLD AUTO: 39.9 % (ref 40–54)
HGB BLD-MCNC: 13.9 G/DL (ref 14–18)
IMM GRANULOCYTES # BLD AUTO: 0.05 K/UL (ref 0–0.04)
IMM GRANULOCYTES NFR BLD AUTO: 0.9 % (ref 0–0.5)
LYMPHOCYTES # BLD AUTO: 2.6 K/UL (ref 1–4.8)
LYMPHOCYTES NFR BLD: 46.4 % (ref 18–48)
MCH RBC QN AUTO: 31.4 PG (ref 27–31)
MCHC RBC AUTO-ENTMCNC: 34.8 G/DL (ref 32–36)
MCV RBC AUTO: 90 FL (ref 82–98)
MONOCYTES # BLD AUTO: 0.6 K/UL (ref 0.3–1)
MONOCYTES NFR BLD: 10.7 % (ref 4–15)
NEUTROPHILS # BLD AUTO: 2.1 K/UL (ref 1.8–7.7)
NEUTROPHILS NFR BLD: 37.2 % (ref 38–73)
NRBC BLD-RTO: 0 /100 WBC
PLATELET # BLD AUTO: 245 K/UL (ref 150–450)
PMV BLD AUTO: 9.5 FL (ref 9.2–12.9)
RBC # BLD AUTO: 4.43 M/UL (ref 4.6–6.2)
WBC # BLD AUTO: 5.62 K/UL (ref 3.9–12.7)

## 2021-04-10 PROCEDURE — 36415 COLL VENOUS BLD VENIPUNCTURE: CPT | Performed by: INTERNAL MEDICINE

## 2021-04-10 PROCEDURE — 85025 COMPLETE CBC W/AUTO DIFF WBC: CPT | Performed by: INTERNAL MEDICINE

## 2021-04-12 ENCOUNTER — PATIENT MESSAGE (OUTPATIENT)
Dept: RHEUMATOLOGY | Facility: CLINIC | Age: 46
End: 2021-04-12

## 2021-04-12 ENCOUNTER — PATIENT OUTREACH (OUTPATIENT)
Dept: ADMINISTRATIVE | Facility: OTHER | Age: 46
End: 2021-04-12

## 2021-04-12 ENCOUNTER — TELEPHONE (OUTPATIENT)
Dept: INTERNAL MEDICINE | Facility: CLINIC | Age: 46
End: 2021-04-12

## 2021-04-13 ENCOUNTER — OFFICE VISIT (OUTPATIENT)
Dept: INTERNAL MEDICINE | Facility: CLINIC | Age: 46
End: 2021-04-13
Payer: COMMERCIAL

## 2021-04-13 DIAGNOSIS — M47.26 OSTEOARTHRITIS OF SPINE WITH RADICULOPATHY, LUMBAR REGION: ICD-10-CM

## 2021-04-13 DIAGNOSIS — I10 ESSENTIAL HYPERTENSION: ICD-10-CM

## 2021-04-13 DIAGNOSIS — M35.9 UNDIFFERENTIATED CONNECTIVE TISSUE DISEASE: ICD-10-CM

## 2021-04-13 DIAGNOSIS — M54.9 UPPER BACK PAIN: ICD-10-CM

## 2021-04-13 DIAGNOSIS — U07.1 COVID-19: ICD-10-CM

## 2021-04-13 DIAGNOSIS — M25.552 HIP PAIN, BILATERAL: Primary | ICD-10-CM

## 2021-04-13 DIAGNOSIS — Z72.0 TOBACCO ABUSE: ICD-10-CM

## 2021-04-13 DIAGNOSIS — M25.551 HIP PAIN, BILATERAL: Primary | ICD-10-CM

## 2021-04-13 PROCEDURE — 99214 PR OFFICE/OUTPT VISIT, EST, LEVL IV, 30-39 MIN: ICD-10-PCS | Mod: 95,,, | Performed by: FAMILY MEDICINE

## 2021-04-13 PROCEDURE — 99214 OFFICE O/P EST MOD 30 MIN: CPT | Mod: 95,,, | Performed by: FAMILY MEDICINE

## 2021-04-13 RX ORDER — MELOXICAM 15 MG/1
15 TABLET ORAL DAILY PRN
Qty: 30 TABLET | Refills: 1 | Status: SHIPPED | OUTPATIENT
Start: 2021-04-13 | End: 2021-10-31

## 2021-04-24 ENCOUNTER — OFFICE VISIT (OUTPATIENT)
Dept: OPHTHALMOLOGY | Facility: CLINIC | Age: 46
End: 2021-04-24
Payer: COMMERCIAL

## 2021-04-24 DIAGNOSIS — M35.00 SJOGREN'S SYNDROME, WITH UNSPECIFIED ORGAN INVOLVEMENT: ICD-10-CM

## 2021-04-24 DIAGNOSIS — H52.4 PRESBYOPIA: ICD-10-CM

## 2021-04-24 DIAGNOSIS — M35.9 UNDIFFERENTIATED CONNECTIVE TISSUE DISEASE: Primary | ICD-10-CM

## 2021-04-24 DIAGNOSIS — Z79.899 LONG-TERM USE OF PLAQUENIL: ICD-10-CM

## 2021-04-24 DIAGNOSIS — H04.129 DRY EYE: ICD-10-CM

## 2021-04-24 PROCEDURE — 92134 CPTRZ OPH DX IMG PST SGM RTA: CPT | Mod: S$GLB,,, | Performed by: OPTOMETRIST

## 2021-04-24 PROCEDURE — 92015 PR REFRACTION: ICD-10-PCS | Mod: S$GLB,,, | Performed by: OPTOMETRIST

## 2021-04-24 PROCEDURE — 99999 PR PBB SHADOW E&M-EST. PATIENT-LVL II: ICD-10-PCS | Mod: PBBFAC,,, | Performed by: OPTOMETRIST

## 2021-04-24 PROCEDURE — 92134 POSTERIOR SEGMENT OCT RETINA (OCULAR COHERENCE TOMOGRAPHY)-BOTH EYES: ICD-10-PCS | Mod: S$GLB,,, | Performed by: OPTOMETRIST

## 2021-04-24 PROCEDURE — 92015 DETERMINE REFRACTIVE STATE: CPT | Mod: S$GLB,,, | Performed by: OPTOMETRIST

## 2021-04-24 PROCEDURE — 92014 PR EYE EXAM, EST PATIENT,COMPREHESV: ICD-10-PCS | Mod: S$GLB,,, | Performed by: OPTOMETRIST

## 2021-04-24 PROCEDURE — 92014 COMPRE OPH EXAM EST PT 1/>: CPT | Mod: S$GLB,,, | Performed by: OPTOMETRIST

## 2021-04-24 PROCEDURE — 99999 PR PBB SHADOW E&M-EST. PATIENT-LVL II: CPT | Mod: PBBFAC,,, | Performed by: OPTOMETRIST

## 2021-04-24 RX ORDER — CYCLOSPORINE 0.5 MG/ML
1 EMULSION OPHTHALMIC 2 TIMES DAILY
Qty: 60 VIAL | Refills: 12 | Status: SHIPPED | OUTPATIENT
Start: 2021-04-24 | End: 2023-03-09

## 2021-05-18 DIAGNOSIS — L93.2 CUTANEOUS LUPUS ERYTHEMATOSUS: ICD-10-CM

## 2021-05-19 RX ORDER — HYDROXYCHLOROQUINE SULFATE 200 MG/1
200 TABLET, FILM COATED ORAL 2 TIMES DAILY
Qty: 60 TABLET | Refills: 6 | Status: SHIPPED | OUTPATIENT
Start: 2021-05-19 | End: 2021-12-29 | Stop reason: SDUPTHER

## 2021-06-01 RX ORDER — TADALAFIL 5 MG/1
10 TABLET ORAL DAILY PRN
Qty: 30 TABLET | Refills: 0 | Status: CANCELLED | OUTPATIENT
Start: 2021-06-01 | End: 2022-06-01

## 2021-06-04 ENCOUNTER — PATIENT MESSAGE (OUTPATIENT)
Dept: RHEUMATOLOGY | Facility: CLINIC | Age: 46
End: 2021-06-04

## 2021-06-14 ENCOUNTER — HOSPITAL ENCOUNTER (OUTPATIENT)
Dept: RADIOLOGY | Facility: HOSPITAL | Age: 46
Discharge: HOME | End: 2021-06-14
Attending: PHYSICIAN ASSISTANT
Payer: COMMERCIAL

## 2021-06-14 ENCOUNTER — OFFICE VISIT (OUTPATIENT)
Dept: RHEUMATOLOGY | Facility: CLINIC | Age: 46
End: 2021-06-14
Payer: COMMERCIAL

## 2021-06-14 VITALS
HEART RATE: 82 BPM | SYSTOLIC BLOOD PRESSURE: 132 MMHG | DIASTOLIC BLOOD PRESSURE: 82 MMHG | HEIGHT: 71 IN | BODY MASS INDEX: 34.35 KG/M2 | WEIGHT: 245.38 LBS

## 2021-06-14 DIAGNOSIS — M54.9 DORSALGIA, UNSPECIFIED: ICD-10-CM

## 2021-06-14 DIAGNOSIS — M25.552 LEFT HIP PAIN: ICD-10-CM

## 2021-06-14 DIAGNOSIS — M35.01 SJOGREN'S SYNDROME WITH KERATOCONJUNCTIVITIS SICCA: ICD-10-CM

## 2021-06-14 DIAGNOSIS — M25.552 LEFT HIP PAIN: Primary | ICD-10-CM

## 2021-06-14 DIAGNOSIS — L93.2 CUTANEOUS LUPUS ERYTHEMATOSUS: ICD-10-CM

## 2021-06-14 DIAGNOSIS — Z79.899 HIGH RISK MEDICATION USE: ICD-10-CM

## 2021-06-14 DIAGNOSIS — M46.1 SACROILIITIS: ICD-10-CM

## 2021-06-14 PROCEDURE — 3008F BODY MASS INDEX DOCD: CPT | Mod: CPTII,S$GLB,, | Performed by: PHYSICIAN ASSISTANT

## 2021-06-14 PROCEDURE — 99215 OFFICE O/P EST HI 40 MIN: CPT | Mod: S$GLB,,, | Performed by: PHYSICIAN ASSISTANT

## 2021-06-14 PROCEDURE — 99215 PR OFFICE/OUTPT VISIT, EST, LEVL V, 40-54 MIN: ICD-10-PCS | Mod: S$GLB,,, | Performed by: PHYSICIAN ASSISTANT

## 2021-06-14 PROCEDURE — 1125F PR PAIN SEVERITY QUANTIFIED, PAIN PRESENT: ICD-10-PCS | Mod: S$GLB,,, | Performed by: PHYSICIAN ASSISTANT

## 2021-06-14 PROCEDURE — 3008F PR BODY MASS INDEX (BMI) DOCUMENTED: ICD-10-PCS | Mod: CPTII,S$GLB,, | Performed by: PHYSICIAN ASSISTANT

## 2021-06-14 PROCEDURE — 99999 PR PBB SHADOW E&M-EST. PATIENT-LVL V: CPT | Mod: PBBFAC,,, | Performed by: PHYSICIAN ASSISTANT

## 2021-06-14 PROCEDURE — 73521 X-RAY EXAM HIPS BI 2 VIEWS: CPT | Mod: TC

## 2021-06-14 PROCEDURE — 99999 PR PBB SHADOW E&M-EST. PATIENT-LVL V: ICD-10-PCS | Mod: PBBFAC,,, | Performed by: PHYSICIAN ASSISTANT

## 2021-06-14 PROCEDURE — 1125F AMNT PAIN NOTED PAIN PRSNT: CPT | Mod: S$GLB,,, | Performed by: PHYSICIAN ASSISTANT

## 2021-06-14 PROCEDURE — 73521 X-RAY EXAM HIPS BI 2 VIEWS: CPT | Mod: 26,,, | Performed by: RADIOLOGY

## 2021-06-14 PROCEDURE — 73521 XR HIPS BILATERAL 2 VIEW INCL AP PELVIS: ICD-10-PCS | Mod: 26,,, | Performed by: RADIOLOGY

## 2021-06-14 RX ORDER — METHYLPREDNISOLONE 4 MG/1
TABLET ORAL
Qty: 1 PACKAGE | Refills: 0 | Status: SHIPPED | OUTPATIENT
Start: 2021-06-14 | End: 2021-07-05

## 2021-06-15 ENCOUNTER — TELEPHONE (OUTPATIENT)
Dept: RHEUMATOLOGY | Facility: CLINIC | Age: 46
End: 2021-06-15

## 2021-06-15 DIAGNOSIS — Z79.899 HIGH RISK MEDICATION USE: Primary | ICD-10-CM

## 2021-06-30 ENCOUNTER — PATIENT MESSAGE (OUTPATIENT)
Dept: RHEUMATOLOGY | Facility: CLINIC | Age: 46
End: 2021-06-30

## 2021-07-09 ENCOUNTER — PATIENT MESSAGE (OUTPATIENT)
Dept: RHEUMATOLOGY | Facility: CLINIC | Age: 46
End: 2021-07-09

## 2021-07-09 ENCOUNTER — TELEPHONE (OUTPATIENT)
Dept: RHEUMATOLOGY | Facility: CLINIC | Age: 46
End: 2021-07-09

## 2021-07-12 ENCOUNTER — LAB VISIT (OUTPATIENT)
Dept: LAB | Facility: HOSPITAL | Age: 46
End: 2021-07-12
Attending: PHYSICIAN ASSISTANT
Payer: COMMERCIAL

## 2021-07-12 ENCOUNTER — HOSPITAL ENCOUNTER (OUTPATIENT)
Dept: RADIOLOGY | Facility: HOSPITAL | Age: 46
Discharge: HOME | End: 2021-07-12
Attending: PHYSICIAN ASSISTANT
Payer: COMMERCIAL

## 2021-07-12 DIAGNOSIS — Z79.899 HIGH RISK MEDICATION USE: ICD-10-CM

## 2021-07-12 DIAGNOSIS — M54.9 DORSALGIA, UNSPECIFIED: ICD-10-CM

## 2021-07-12 DIAGNOSIS — M46.1 SACROILIITIS: ICD-10-CM

## 2021-07-12 DIAGNOSIS — M25.552 LEFT HIP PAIN: ICD-10-CM

## 2021-07-12 LAB
CREAT SERPL-MCNC: 1.1 MG/DL (ref 0.5–1.4)
EST. GFR  (AFRICAN AMERICAN): >60 ML/MIN/1.73 M^2
EST. GFR  (NON AFRICAN AMERICAN): >60 ML/MIN/1.73 M^2

## 2021-07-12 PROCEDURE — 72197 MRI PELVIS W/O & W/DYE: CPT | Mod: 26,,, | Performed by: RADIOLOGY

## 2021-07-12 PROCEDURE — A9585 GADOBUTROL INJECTION: HCPCS | Performed by: PHYSICIAN ASSISTANT

## 2021-07-12 PROCEDURE — 73721 MRI HIP WITHOUT CONTRAST LEFT: ICD-10-PCS | Mod: 26,LT,, | Performed by: RADIOLOGY

## 2021-07-12 PROCEDURE — 72197 MRI PELVIS W/O & W/DYE: CPT | Mod: TC

## 2021-07-12 PROCEDURE — 73721 MRI JNT OF LWR EXTRE W/O DYE: CPT | Mod: 26,LT,, | Performed by: RADIOLOGY

## 2021-07-12 PROCEDURE — 36415 COLL VENOUS BLD VENIPUNCTURE: CPT | Performed by: PHYSICIAN ASSISTANT

## 2021-07-12 PROCEDURE — 25500020 PHARM REV CODE 255: Performed by: PHYSICIAN ASSISTANT

## 2021-07-12 PROCEDURE — 72197 MRI SACROILIAC JOINTS W W/O CONTRAST: ICD-10-PCS | Mod: 26,,, | Performed by: RADIOLOGY

## 2021-07-12 PROCEDURE — 73721 MRI JNT OF LWR EXTRE W/O DYE: CPT | Mod: TC,LT

## 2021-07-12 PROCEDURE — 82565 ASSAY OF CREATININE: CPT | Performed by: PHYSICIAN ASSISTANT

## 2021-07-12 RX ORDER — GADOBUTROL 604.72 MG/ML
10 INJECTION INTRAVENOUS
Status: COMPLETED | OUTPATIENT
Start: 2021-07-12 | End: 2021-07-12

## 2021-07-12 RX ADMIN — GADOBUTROL 10 ML: 604.72 INJECTION INTRAVENOUS at 12:07

## 2021-07-13 ENCOUNTER — PATIENT MESSAGE (OUTPATIENT)
Dept: RHEUMATOLOGY | Facility: CLINIC | Age: 46
End: 2021-07-13

## 2021-07-26 ENCOUNTER — PATIENT MESSAGE (OUTPATIENT)
Dept: INTERNAL MEDICINE | Facility: CLINIC | Age: 46
End: 2021-07-26

## 2021-08-04 ENCOUNTER — PATIENT MESSAGE (OUTPATIENT)
Dept: INTERNAL MEDICINE | Facility: CLINIC | Age: 46
End: 2021-08-04

## 2021-09-02 ENCOUNTER — TELEPHONE (OUTPATIENT)
Dept: RHEUMATOLOGY | Facility: CLINIC | Age: 46
End: 2021-09-02

## 2021-10-05 ENCOUNTER — PATIENT MESSAGE (OUTPATIENT)
Dept: RHEUMATOLOGY | Facility: CLINIC | Age: 46
End: 2021-10-05

## 2021-10-07 RX ORDER — PREDNISONE 5 MG/1
TABLET ORAL
Qty: 90 TABLET | Refills: 1 | Status: SHIPPED | OUTPATIENT
Start: 2021-10-07 | End: 2023-01-17

## 2021-10-18 ENCOUNTER — PATIENT MESSAGE (OUTPATIENT)
Dept: RHEUMATOLOGY | Facility: CLINIC | Age: 46
End: 2021-10-18
Payer: COMMERCIAL

## 2021-10-18 DIAGNOSIS — M46.1 SACROILIITIS: ICD-10-CM

## 2021-10-18 DIAGNOSIS — M25.559 HIP PAIN: Primary | ICD-10-CM

## 2021-10-19 ENCOUNTER — TELEPHONE (OUTPATIENT)
Dept: PAIN MEDICINE | Facility: CLINIC | Age: 46
End: 2021-10-19

## 2021-11-10 ENCOUNTER — PATIENT MESSAGE (OUTPATIENT)
Dept: RHEUMATOLOGY | Facility: CLINIC | Age: 46
End: 2021-11-10
Payer: COMMERCIAL

## 2021-11-18 ENCOUNTER — PATIENT OUTREACH (OUTPATIENT)
Dept: ADMINISTRATIVE | Facility: OTHER | Age: 46
End: 2021-11-18
Payer: COMMERCIAL

## 2021-12-15 ENCOUNTER — TELEPHONE (OUTPATIENT)
Dept: PAIN MEDICINE | Facility: CLINIC | Age: 46
End: 2021-12-15
Payer: COMMERCIAL

## 2021-12-16 ENCOUNTER — OFFICE VISIT (OUTPATIENT)
Dept: PAIN MEDICINE | Facility: CLINIC | Age: 46
End: 2021-12-16
Payer: COMMERCIAL

## 2021-12-16 VITALS
DIASTOLIC BLOOD PRESSURE: 81 MMHG | BODY MASS INDEX: 34.29 KG/M2 | HEART RATE: 88 BPM | SYSTOLIC BLOOD PRESSURE: 145 MMHG | HEIGHT: 71 IN | RESPIRATION RATE: 18 BRPM | WEIGHT: 244.94 LBS

## 2021-12-16 DIAGNOSIS — M25.559 HIP PAIN: ICD-10-CM

## 2021-12-16 DIAGNOSIS — M46.1 SACROILIITIS: ICD-10-CM

## 2021-12-16 PROCEDURE — 99204 OFFICE O/P NEW MOD 45 MIN: CPT | Mod: S$GLB,,, | Performed by: ANESTHESIOLOGY

## 2021-12-16 PROCEDURE — 99999 PR PBB SHADOW E&M-EST. PATIENT-LVL V: CPT | Mod: PBBFAC,,, | Performed by: ANESTHESIOLOGY

## 2021-12-16 PROCEDURE — 99999 PR PBB SHADOW E&M-EST. PATIENT-LVL V: ICD-10-PCS | Mod: PBBFAC,,, | Performed by: ANESTHESIOLOGY

## 2021-12-16 PROCEDURE — 99204 PR OFFICE/OUTPT VISIT, NEW, LEVL IV, 45-59 MIN: ICD-10-PCS | Mod: S$GLB,,, | Performed by: ANESTHESIOLOGY

## 2021-12-16 RX ORDER — NABUMETONE 500 MG/1
500 TABLET, FILM COATED ORAL 2 TIMES DAILY PRN
Qty: 60 TABLET | Refills: 0 | Status: SHIPPED | OUTPATIENT
Start: 2021-12-16 | End: 2022-01-15

## 2021-12-28 ENCOUNTER — PATIENT OUTREACH (OUTPATIENT)
Dept: ADMINISTRATIVE | Facility: OTHER | Age: 46
End: 2021-12-28
Payer: COMMERCIAL

## 2021-12-29 ENCOUNTER — PATIENT MESSAGE (OUTPATIENT)
Dept: RHEUMATOLOGY | Facility: CLINIC | Age: 46
End: 2021-12-29

## 2021-12-29 ENCOUNTER — OFFICE VISIT (OUTPATIENT)
Dept: RHEUMATOLOGY | Facility: CLINIC | Age: 46
End: 2021-12-29
Payer: COMMERCIAL

## 2021-12-29 ENCOUNTER — HOSPITAL ENCOUNTER (OUTPATIENT)
Dept: RADIOLOGY | Facility: HOSPITAL | Age: 46
Discharge: HOME | End: 2021-12-29
Attending: PHYSICIAN ASSISTANT
Payer: COMMERCIAL

## 2021-12-29 VITALS
HEIGHT: 71 IN | WEIGHT: 248.25 LBS | HEART RATE: 87 BPM | BODY MASS INDEX: 34.75 KG/M2 | SYSTOLIC BLOOD PRESSURE: 134 MMHG | DIASTOLIC BLOOD PRESSURE: 82 MMHG

## 2021-12-29 DIAGNOSIS — M25.561 BILATERAL ANTERIOR KNEE PAIN: ICD-10-CM

## 2021-12-29 DIAGNOSIS — M25.562 BILATERAL ANTERIOR KNEE PAIN: ICD-10-CM

## 2021-12-29 DIAGNOSIS — F32.A DEPRESSION, UNSPECIFIED DEPRESSION TYPE: ICD-10-CM

## 2021-12-29 DIAGNOSIS — M35.01 SJOGREN'S SYNDROME WITH KERATOCONJUNCTIVITIS SICCA: ICD-10-CM

## 2021-12-29 DIAGNOSIS — Z51.81 MEDICATION MONITORING ENCOUNTER: ICD-10-CM

## 2021-12-29 DIAGNOSIS — M35.9 UNDIFFERENTIATED CONNECTIVE TISSUE DISEASE: ICD-10-CM

## 2021-12-29 DIAGNOSIS — L93.2 CUTANEOUS LUPUS ERYTHEMATOSUS: Primary | ICD-10-CM

## 2021-12-29 PROCEDURE — 73562 X-RAY EXAM OF KNEE 3: CPT | Mod: TC,50

## 2021-12-29 PROCEDURE — 3008F BODY MASS INDEX DOCD: CPT | Mod: CPTII,S$GLB,, | Performed by: PHYSICIAN ASSISTANT

## 2021-12-29 PROCEDURE — 99999 PR PBB SHADOW E&M-EST. PATIENT-LVL III: ICD-10-PCS | Mod: PBBFAC,,, | Performed by: PHYSICIAN ASSISTANT

## 2021-12-29 PROCEDURE — 73562 X-RAY EXAM OF KNEE 3: CPT | Mod: 26,,, | Performed by: RADIOLOGY

## 2021-12-29 PROCEDURE — 99999 PR PBB SHADOW E&M-EST. PATIENT-LVL III: CPT | Mod: PBBFAC,,, | Performed by: PHYSICIAN ASSISTANT

## 2021-12-29 PROCEDURE — 99215 OFFICE O/P EST HI 40 MIN: CPT | Mod: S$GLB,,, | Performed by: PHYSICIAN ASSISTANT

## 2021-12-29 PROCEDURE — 3075F PR MOST RECENT SYSTOLIC BLOOD PRESS GE 130-139MM HG: ICD-10-PCS | Mod: CPTII,S$GLB,, | Performed by: PHYSICIAN ASSISTANT

## 2021-12-29 PROCEDURE — 1159F MED LIST DOCD IN RCRD: CPT | Mod: CPTII,S$GLB,, | Performed by: PHYSICIAN ASSISTANT

## 2021-12-29 PROCEDURE — 73562 XR KNEE ORTHO BILAT: ICD-10-PCS | Mod: 26,,, | Performed by: RADIOLOGY

## 2021-12-29 PROCEDURE — 99215 PR OFFICE/OUTPT VISIT, EST, LEVL V, 40-54 MIN: ICD-10-PCS | Mod: S$GLB,,, | Performed by: PHYSICIAN ASSISTANT

## 2021-12-29 PROCEDURE — 3079F PR MOST RECENT DIASTOLIC BLOOD PRESSURE 80-89 MM HG: ICD-10-PCS | Mod: CPTII,S$GLB,, | Performed by: PHYSICIAN ASSISTANT

## 2021-12-29 PROCEDURE — 3008F PR BODY MASS INDEX (BMI) DOCUMENTED: ICD-10-PCS | Mod: CPTII,S$GLB,, | Performed by: PHYSICIAN ASSISTANT

## 2021-12-29 PROCEDURE — 3079F DIAST BP 80-89 MM HG: CPT | Mod: CPTII,S$GLB,, | Performed by: PHYSICIAN ASSISTANT

## 2021-12-29 PROCEDURE — 3075F SYST BP GE 130 - 139MM HG: CPT | Mod: CPTII,S$GLB,, | Performed by: PHYSICIAN ASSISTANT

## 2021-12-29 PROCEDURE — 1159F PR MEDICATION LIST DOCUMENTED IN MEDICAL RECORD: ICD-10-PCS | Mod: CPTII,S$GLB,, | Performed by: PHYSICIAN ASSISTANT

## 2021-12-29 RX ORDER — BUPROPION HYDROCHLORIDE 75 MG/1
75 TABLET ORAL 2 TIMES DAILY
Qty: 60 TABLET | Refills: 2 | Status: SHIPPED | OUTPATIENT
Start: 2021-12-29 | End: 2022-10-31

## 2021-12-29 RX ORDER — HYDROXYCHLOROQUINE SULFATE 200 MG/1
200 TABLET, FILM COATED ORAL 2 TIMES DAILY
Qty: 60 TABLET | Refills: 6 | Status: SHIPPED | OUTPATIENT
Start: 2021-12-29 | End: 2022-08-15 | Stop reason: SDUPTHER

## 2021-12-30 ENCOUNTER — PATIENT MESSAGE (OUTPATIENT)
Dept: RHEUMATOLOGY | Facility: CLINIC | Age: 46
End: 2021-12-30
Payer: COMMERCIAL

## 2022-01-11 NOTE — PRE-PROCEDURE INSTRUCTIONS
Spoke with patient regarding procedure scheduled on 1.19     Arrival time 0620     Has patient been sick with fever or on antibiotics within the last 7 days? No     Does the patient have any open wounds, sores or rashes? No     Does the patient have any recent fractures? no     Has patient received a vaccination within the last 7 days? No     Received the COVID vaccination? yes     Has the patient stopped all medications as directed? Na     Does patient have a pacemaker and or defibrillator? no     Does the patient have a ride to and from procedure and someone reliable to remain with patient? wife candace      Is the patient diabetic? no     Does the patient have sleep apnea? Or use O2 at home? No and no      Is the patient receiving sedation? yes     Is the patient instructed to remain NPO beginning at midnight the night before their procedure? yes     Procedure location confirmed with patient? Yes     Covid- Denies signs/symptoms. Instructed to notify PAT/MD if any changes.

## 2022-01-19 ENCOUNTER — HOSPITAL ENCOUNTER (OUTPATIENT)
Facility: HOSPITAL | Age: 47
Discharge: HOME | End: 2022-01-19
Attending: ANESTHESIOLOGY | Admitting: ANESTHESIOLOGY
Payer: COMMERCIAL

## 2022-01-19 VITALS
WEIGHT: 243.38 LBS | RESPIRATION RATE: 17 BRPM | DIASTOLIC BLOOD PRESSURE: 89 MMHG | HEART RATE: 79 BPM | HEIGHT: 71 IN | SYSTOLIC BLOOD PRESSURE: 156 MMHG | OXYGEN SATURATION: 96 % | TEMPERATURE: 98 F | BODY MASS INDEX: 34.07 KG/M2

## 2022-01-19 DIAGNOSIS — M46.1 SACROILIITIS: ICD-10-CM

## 2022-01-19 DIAGNOSIS — M25.559 HIP PAIN: ICD-10-CM

## 2022-01-19 PROCEDURE — 25500020 PHARM REV CODE 255: Performed by: ANESTHESIOLOGY

## 2022-01-19 PROCEDURE — 63600175 PHARM REV CODE 636 W HCPCS: Performed by: ANESTHESIOLOGY

## 2022-01-19 PROCEDURE — 27096 INJECT SACROILIAC JOINT: CPT | Mod: LT,,, | Performed by: ANESTHESIOLOGY

## 2022-01-19 PROCEDURE — 20610 PR DRAIN/INJECT LARGE JOINT/BURSA: ICD-10-PCS | Mod: 59,LT,, | Performed by: ANESTHESIOLOGY

## 2022-01-19 PROCEDURE — 20610 DRAIN/INJ JOINT/BURSA W/O US: CPT | Mod: 59,LT,, | Performed by: ANESTHESIOLOGY

## 2022-01-19 PROCEDURE — A9585 GADOBUTROL INJECTION: HCPCS | Performed by: ANESTHESIOLOGY

## 2022-01-19 PROCEDURE — 27096 PR INJECTION,SACROILIAC JOINT: ICD-10-PCS | Mod: LT,,, | Performed by: ANESTHESIOLOGY

## 2022-01-19 PROCEDURE — 20610 DRAIN/INJ JOINT/BURSA W/O US: CPT | Performed by: ANESTHESIOLOGY

## 2022-01-19 PROCEDURE — 27096 INJECT SACROILIAC JOINT: CPT | Performed by: ANESTHESIOLOGY

## 2022-01-19 PROCEDURE — 25000003 PHARM REV CODE 250: Performed by: ANESTHESIOLOGY

## 2022-01-19 RX ORDER — FENTANYL CITRATE 50 UG/ML
INJECTION, SOLUTION INTRAMUSCULAR; INTRAVENOUS
Status: DISCONTINUED | OUTPATIENT
Start: 2022-01-19 | End: 2022-01-19 | Stop reason: HOSPADM

## 2022-01-19 RX ORDER — INDOMETHACIN 25 MG/1
CAPSULE ORAL
Status: DISCONTINUED | OUTPATIENT
Start: 2022-01-19 | End: 2022-01-19 | Stop reason: HOSPADM

## 2022-01-19 RX ORDER — MIDAZOLAM HYDROCHLORIDE 1 MG/ML
INJECTION, SOLUTION INTRAMUSCULAR; INTRAVENOUS
Status: DISCONTINUED | OUTPATIENT
Start: 2022-01-19 | End: 2022-01-19 | Stop reason: HOSPADM

## 2022-01-19 RX ORDER — GADOBUTROL 604.72 MG/ML
INJECTION INTRAVENOUS
Status: DISCONTINUED | OUTPATIENT
Start: 2022-01-19 | End: 2022-01-19 | Stop reason: HOSPADM

## 2022-01-19 RX ORDER — BUPIVACAINE HYDROCHLORIDE 2.5 MG/ML
INJECTION, SOLUTION EPIDURAL; INFILTRATION; INTRACAUDAL
Status: DISCONTINUED | OUTPATIENT
Start: 2022-01-19 | End: 2022-01-19 | Stop reason: HOSPADM

## 2022-01-19 RX ORDER — TRIAMCINOLONE ACETONIDE 40 MG/ML
INJECTION, SUSPENSION INTRA-ARTICULAR; INTRAMUSCULAR
Status: DISCONTINUED | OUTPATIENT
Start: 2022-01-19 | End: 2022-01-19 | Stop reason: HOSPADM

## 2022-01-19 NOTE — H&P
HPI  Patient presenting for Procedure(s) (LRB):  Left SIJ Injection  with RN IV sedation (Left)  Left Hip injection by with RN IV sedation (Left)     Patient on Anti-coagulation No    No health changes since previous encounter    Past Medical History:   Diagnosis Date    Hyperglycemia     Hypertension     Lupus erythematosus     MCTD (mixed connective tissue disease)     Dr Branham     Vitiligo      Past Surgical History:   Procedure Laterality Date    NASAL SEPTUM SURGERY      neck lipoma excision       Review of patient's allergies indicates:   Allergen Reactions    Shellfish containing products     Sulfa (sulfonamide antibiotics) Hives     Other reaction(s): Unknown    Sulfamethoxazole-trimethoprim Hives        No current facility-administered medications on file prior to encounter.     Current Outpatient Medications on File Prior to Encounter   Medication Sig Dispense Refill    amLODIPine (NORVASC) 10 MG tablet Take 1 tablet (10 mg total) by mouth once daily. 90 tablet 1    betamethasone dipropionate (DIPROLENE) 0.05 % cream Apply topically 2 (two) times daily. for 10 days (Patient not taking: Reported on 12/29/2021) 15 g 0    cycloSPORINE (RESTASIS) 0.05 % ophthalmic emulsion Place 1 drop into both eyes 2 (two) times daily. (Patient not taking: Reported on 12/29/2021) 60 vial 12    diclofenac sodium (PENNSAID) 20 mg/gram /actuation(2 %) sopm Apply 2 Pump topically 2 (two) times daily. 1 Bottle 6    fluticasone propionate (FLONASE) 50 mcg/actuation nasal spray 2 sprays (100 mcg total) by Each Nostril route once daily. 16 g 6    predniSONE (DELTASONE) 5 MG tablet 20mg/d x 3 d 15mg/d x 3 d 10mg/d x 3 days for arthritis flares 90 tablet 1    tadalafiL (CIALIS) 5 MG tablet Take 2 tablets (10 mg total) by mouth daily as needed for Erectile Dysfunction. 30 tablet 0        PMHx, PSHx, Allergies, Medications reviewed in epic    ROS negative except pain complaints in HPI    OBJECTIVE:    BP (!) 144/90 (BP  "Location: Right arm, Patient Position: Sitting)   Pulse 77   Temp 97.9 °F (36.6 °C) (Temporal)   Resp 18   Ht 5' 11" (1.803 m)   Wt 110.4 kg (243 lb 6.2 oz)   SpO2 100%   BMI 33.95 kg/m²     PHYSICAL EXAMINATION:    GENERAL: Well appearing, in no acute distress, alert and oriented x3.  PSYCH:  Mood and affect appropriate.  SKIN: Skin color, texture, turgor normal, no rashes or lesions which will impact the procedure.  CV: RRR with palpation of the radial artery.  PULM: No evidence of respiratory difficulty, symmetric chest rise. Clear to auscultation.  NEURO: Cranial nerves grossly intact.    Plan:    Proceed with procedure as planned Procedure(s) (LRB):  Left SIJ Injection  with RN IV sedation (Left)  Left Hip injection by with RN IV sedation (Left)    Silvia Buchanan MD  01/19/2022            "

## 2022-01-19 NOTE — DISCHARGE SUMMARY
[facility] - Pain Mgmt 1st Fl  Discharge Note  Short Stay    Procedure(s) (LRB):  Left SIJ Injection  with RN IV sedation (Left)  Left Hip injection by with RN IV sedation (Left)    OUTCOME: Patient tolerated treatment/procedure well without complication and is now ready for discharge.    DISPOSITION: Home or Self Care    FINAL DIAGNOSIS:  <principal problem not specified>    FOLLOWUP: In clinic    DISCHARGE INSTRUCTIONS:  No discharge procedures on file.     TIME SPENT ON DISCHARGE: 15 minutes

## 2022-01-19 NOTE — OP NOTE
Procedure: Hip (acetabulofemoral) joint injection under fluoroscopic guidance    Side: left    Pre-procedure diagnosis: osteoarthritis of the hip (of the above noted laterality), sacroiliitis  Post-procedure diagnosis: same    PROVIDER: Silvia Buchanan MD  Assistant(s): None    Anesthesia:   Conscious sedation provided by M.D    The patient was monitored with continuous pulse oximetry, EKG, and intermittent blood pressure monitors.  The patient was hemodynamically stable throughout the entire process was responsive to voice, and breathing spontaneously.  Supplemental O2 was provided at 2L/min via nasal cannula.  Patient was comfortable for the duration of the procedure. (See nurse documentation and case log for sedation time)    There was a total of 2mg IV Midazolam and 50mcg Fentanyl titrated for the procedure      INDICATION: The patient has hip pain unresponsive to conservative treatments. Fluoroscopy was used to optimize visualization of needle placement and to maximize safety.     Description of Procedure:  Prior to starting this procedure, risks, benefits, complications, and alternatives were discussed with the patient. The patient agreed to proceed with the procedure and signed a consent. The site and side of the procedure was identified and marked. The patient was taken to the procedural suite and positioned on the table in supine orientation. A time out was performed. All in attendance were in agreement with the time out. The area over the above noted joint/s was widely prepped with ChloraPrep and drapped in usual sterile fashion.    The above noted joint/s was identified on fluoroscopy. A 27-gauge 1.5 inch needle was used to localize the site of entry with 3 mL of 1% PF Lidocaine. A 25 gauge 3.5 inch spinal needle was then introduced and advanced towards the neck of the femur. The target site was approximately 0.5 to 1.0 cm distal to the lateral border of the femoral head and 0.5 to 1.0 cm below the superior  aspect of the femoral neck. The needle was advanced until osseus interface was met. Following negative aspiration, a solution containing 5 mL of 0.25% Bupivacaine and 1 mL of Triamcinolone (40 mg/mL) was then injected. There was minimal resistance encountered throughout injection. No paresthesias were noted on injection. The needle stylet was replaced and the needle was removed intact. The patient tolerated the procedure well. A bandage was applied to the site of injection.    Sacroiliac joint injection:   Pt was assisted in position change. Laying in the prone position, the patient was prepped and draped in the usual sterile fashion using ChloraPrep and fenestrated drape.  The area was determined under fluoroscopy.  Local Xylocaine was injected by raising a wheel and going down to the periosteum using a 27-gauge hypodermic needle.  The 3.5 inch 22-gauge spinal needle was introduce into the Left sacroiliac joint.  Negative pressure applied to confirm no intravascular placement.  Omnipaque was injected to confirm placement and to confirm that there was no vascular runoff.  The medication, 1mL 40mg/ml Kenalog and 4mL Bupivacaine 0.25%  was then injected slowly.  The patient tolerated the procedure well.                       The patient was monitored for approximately 30 minutes after the procedure. Patient was given post procedure and discharge instructions to follow at home. We will see the patient back in two weeks or the patient may call to inform of status. The patient was discharged in a stable condition      Description of Findings: Not applicable    Prosthetic devices, grafts, tissues, or devices implanted: None    Specimen Removed: No    Estimated Blood Loss: minimal    COMPLICATIONS: None    DISPOSITION / PLANS: The patient was transferred to the recovery area in a stable condition for observation. The patient was reexamined prior to discharge. There was no evidence of acute neurologic injury following the  procedure.  Patient was discharged from the recovery room after meeting discharge criteria. Home discharge instructions were given to the patient by the staff.    Silvia Buchanan

## 2022-01-19 NOTE — DISCHARGE INSTRUCTIONS

## 2022-02-10 NOTE — PROGRESS NOTES
Established Patient Chronic Pain Note     Referring Physician: No ref. provider found    PCP: Mary Feliciano MD    Chief Complaint:   Chief Complaint   Patient presents with    Hip Pain     left    Knee Pain     left        SUBJECTIVE:  Interval history 02/14/2022  Patient presents status post left acetabulofemoral and sacroiliac joint injection 01/19/2022.  Patient reports 100% relief in left hip and sacroiliac joint pain lasting approximately 3 weeks.  Today patient reports insidious return of left hip pain.  Patient reports pain is rated 6/10.  Patient reports pain along the anterior aspect of the left hip which radiates into the left groin.  Patient denies radiculopathy past the knee or significant weakness in bilateral lower extremities.  Patient reports not get initiating physical therapy secondary to schedule conflict.    HPI 12/16/2021  Yoandy Forde is a 46 y.o. male with past medical history significant for hypertension, lupus erythematosus, Sjogren's syndrome, severe obesity, GERD, nicotine dependence who presents to the clinic for the evaluation of left-sided hip and groin pain.  Today patient reports pain began several months prior without inciting accident, injury or trauma.  Today patient reports pain which is intermittent and rated as a 6/10.  Patient reports pain which begins in the left hip territory and radiates into the left groin.  Pain is described as sharp and aching in nature.  Pain is exacerbated with reclining, sitting and with flexion abduction and external rotation of the left lower extremity.  Pain is also exacerbated with left foot inversion and eversion.  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  With prolonged standing which also exacerbates his pain.  Patient cannot recall any alleviating factors at this time.  Patient denies any right-sided pain, lower extremity weakness or bowel or bladder incontinence.    Patient denies night fever/night sweats, urinary incontinence,  bowel incontinence, significant weight loss, significant motor weakness and loss of sensations.    Pain Disability Index Review:     Last 3 PDI Scores 2/14/2022   Pain Disability Index (PDI) 34       Non-Pharmacologic Treatments:  Physical Therapy/Home Exercise: yes  Ice/Heat:yes  TENS: no  Acupuncture: no  Massage: no  Chiropractic: no    Other: no      Pain Medications:  - Opioids: Vicodin ( Hydrocodone/Acetaminophen)  - Adjuvant Medications: Cyclobenzaprine (Flexeril), Prednisone (Deltasone), Topical Ointment (Voltaren Gel, Steroid cream, Anti-Inflammatory Cream, Compound cream) and Wellbutrin (Buproprion)    Pain Procedures:   -01/19/2022:  Left-sided acetabular femoral and sacroiliac joint injection    Past Medical History:   Diagnosis Date    Hyperglycemia     Hypertension     Lupus erythematosus     MCTD (mixed connective tissue disease)     Dr Branham     Vitiligo      Past Surgical History:   Procedure Laterality Date    INJECTION OF ANESTHETIC AGENT INTO SACROILIAC JOINT Left 1/19/2022    Procedure: Left SIJ Injection  with RN IV sedation;  Surgeon: Silvia Buchanan MD;  Location: [facility];  Service: Pain Management;  Laterality: Left;    INJECTION OF JOINT Left 1/19/2022    Procedure: Left Hip injection by with RN IV sedation;  Surgeon: Silvia Buchanan MD;  Location: [facility];  Service: Pain Management;  Laterality: Left;    NASAL SEPTUM SURGERY      neck lipoma excision       Review of patient's allergies indicates:   Allergen Reactions    Shellfish containing products     Sulfa (sulfonamide antibiotics) Hives     Other reaction(s): Unknown    Sulfamethoxazole-trimethoprim Hives       Current Outpatient Medications   Medication Sig    amLODIPine (NORVASC) 10 MG tablet Take 1 tablet (10 mg total) by mouth once daily.    betamethasone dipropionate (DIPROLENE) 0.05 % cream Apply topically 2 (two) times daily. for 10 days    buPROPion (WELLBUTRIN) 75 MG tablet Take 1 tablet (75 mg total)  by mouth 2 (two) times daily.    cycloSPORINE (RESTASIS) 0.05 % ophthalmic emulsion Place 1 drop into both eyes 2 (two) times daily.    diclofenac sodium (PENNSAID) 20 mg/gram /actuation(2 %) sopm Apply 2 Pump topically 2 (two) times daily.    fenofibrate (TRICOR) 145 MG tablet TAKE 1 TABLET(145 MG) BY MOUTH EVERY DAY    fluticasone propionate (FLONASE) 50 mcg/actuation nasal spray 2 sprays (100 mcg total) by Each Nostril route once daily.    hydrOXYchloroQUINE (PLAQUENIL) 200 mg tablet Take 1 tablet (200 mg total) by mouth 2 (two) times daily.    tadalafiL (CIALIS) 5 MG tablet Take 2 tablets (10 mg total) by mouth daily as needed for Erectile Dysfunction.    predniSONE (DELTASONE) 5 MG tablet 20mg/d x 3 d 15mg/d x 3 d 10mg/d x 3 days for arthritis flares     Current Facility-Administered Medications   Medication    0.9%  NaCl infusion       Review of Systems     GENERAL:  No weight loss, malaise or fevers.  HEENT:   No recent changes in vision or hearing  NECK:  Negative for lumps, no difficulty with swallowing.  RESPIRATORY:  Negative for cough, wheezing or shortness of breath, patient denies any recent URI.  CARDIOVASCULAR:  Negative for chest pain, leg swelling or palpitations.  GI:  Negative for abdominal discomfort, blood in stools or black stools or change in bowel habits.  MUSCULOSKELETAL:  See HPI.  SKIN:  Negative for lesions, rash, and itching.  PSYCH:  No mood disorder or recent psychosocial stressors.   HEMATOLOGY/LYMPHOLOGY:  Negative for prolonged bleeding, bruising easily or swollen nodes.    NEURO:   No history of headaches, syncope, paralysis, seizures or tremors.  All other reviewed and negative other than HPI.    OBJECTIVE:    BP (!) 138/90   Pulse 87   Wt 111.2 kg (245 lb 2.4 oz)   BMI 34.19 kg/m²       Physical Exam    GENERAL: Well appearing, in no acute distress, alert and oriented x3.  PSYCH:  Mood and affect appropriate.  SKIN: Skin color, texture, turgor normal, no rashes or  lesions.  HEAD/FACE:  Normocephalic, atraumatic. Cranial nerves grossly intact.    CV: RRR with palpation of the radial artery.  PULM: No evidence of respiratory difficulty, symmetric chest rise.  GI:  Soft and non-tender.    BACK: Straight leg raising in the sitting and supine positions is negative to radicular pain. No pain to palpation over the facet joints of the lumbar spine or spinous processes. Normal range of motion without pain reproduction.  EXTREMITIES: Peripheral joint ROM is full and pain free without obvious instability or laxity in all four extremities. No deformities, edema, or skin discoloration. Good capillary refill.  MUSCULOSKELETAL: Able to stand on heels & toes.    hip provocative maneuvers are positive on left  pain with palpation over the sacroiliac joints on left.  FABERs test is positive on left.  FADIR test is negative bilaterally.   Bilateral upper and lower extremity strength is normal and symmetric.  No atrophy or tone abnormalities are noted.  RIGHT Lower extremity: Hip flexion 5/5, Hip Abduction 5/5, Hip Adduction 5/5, Knee extension 5/5, Knee flexion 5/5, Ankle dorsiflexion5/5, Extensor hallucis longus 5/5, Ankle plantarflexion 5/5  LEFT Lower extremity:  Hip flexion 5/5, Hip Abduction 5/5,Hip Adduction 5/5, Knee extension 5/5, Knee flexion 5/5, Ankle dorsiflexion 5/5, Extensor hallucis longus 5/5, Ankle plantarflexion 5/5  -Normal testing knee (patellar) jerk and ankle (achilles) jerk    NEURO: Bilateral upper and lower extremity coordination and muscle stretch reflexes are physiologic and symmetric. No loss of sensation is noted.  GAIT: normal.    Imagin16    X-Ray Lumbar Spine Complete 5 View    Narrative  Five views lumbar spine were obtained (AP, lateral, right oblique, left oblique and spot views).  5 views lumbar spines and demonstrate some sclerosis of the inferior sacroiliac joints bilaterally suggesting sacroiliitis.  Areas mild loss of disc space height at  L3/L4.  I do not see evidence of acute fracture or subluxation of the lumbar region.    Left hip MRI 06/14/2021  FINDINGS:  The T1 marrow signal of the visualized bony pelvis appears to be within normal limits.  The discs of the visualized lower lumbar spine are well hydrated.  The intrapelvic contents are unremarkable in appearance.     There is no evidence to suggest AVN or hip fracture.  No evidence to suggest a significant labral tear on this non arthrogram exam.  No significant joint effusion.  No evidence to suggest iliopsoas or trochanteric bursitis.  The musculature surrounding the left hip demonstrates normal signal.     Impression:     1. Essentially unremarkable MRI of the left hip.    MRI sacroiliac joint 06/14/2021  FINDINGS:  No abnormal STIR signal seen on either side of the SI joints.  No fluid or synovitis seen at either SI joint.  No abnormal enhancement is noted on the postcontrast images.  The sacral foramina are patent.  The T1 marrow signal of the visualized osseous structures is within normal limits.     Impression:     1. Normal MRI of the SI joints.    X-ray bilateral hip 06/14/2021  FINDINGS:  No fracture.  Femoral heads are seated within the acetabula bilaterally.  No femoral head avascular necrosis.  Hip joint spaces are preserved.  Mild lateral spurring of the acetabula bilaterally.  Mild heterotopic calcification adjacent to the left femur greater trochanter.  Sacroiliac joints are normal.  No osseous erosion or suspicious osseous lesion.       ASSESSMENT: 46 y.o. year old male with left-sided hip and groin pain, consistent with     1. Sacroiliitis  Ambulatory referral/consult to Physical/Occupational Therapy   2. Chronic left hip pain  IR Peripheral Nerve Injection    Case Request-RAD/Other Procedure Area: Left-sided femoral and obturator nerve block with RN IV sedation    Ambulatory referral/consult to Physical/Occupational Therapy         PLAN:   - Intervention schedule the patient  for left-sided femoral and obturator nerve block to see if this confers more significant and sustained relief.  We discussed with significant relief he may be a candidate for cooled radiofrequency ablation for more sustained relief. Explained the risks and benefits of the procedure in detail with the patient today in clinic along with alternative treatment options, and the patient elected to pursue the intervention at this time.      - Anticoagulation use: no no anticoagulation     -we have discussed repeating a left-sided sacroiliac joint injection in the future if sacroiliitis exacerbates.     report:  Reviewed and consistent with medication use as prescribed.    - Medications:  -Continue Nabumetone 500 mg BID. We have discussed potential deleterious side effects of NSAIDs on the cardiovascular, gastrointestinal and renal systems. We have discussed judicious use of this medication. Pt expresses understanding.     - Therapy:   We discussed initiating physical therapy to help manage the patient/s painful condition. The patient was counseled that muscle strengthening will improve the long term prognosis in regards to pain and may also help increase range of motion and mobility. They were told that one of the goals of physical therapy is that they learn how to do the exercises so that they can do them independently at home daily upon completion. The patient's questions were answered and they were agreeable to this course. A referral for external physical therapy was provided to the patient.      - Imaging: Reviewed available imaging with patient and answered any questions they had regarding study.    - Follow up visit: return to clinic in 4 weeks      The above plan and management options were discussed at length with patient. Patient is in agreement with the above and verbalized understanding.    - I discussed the goals of interventional chronic pain management with the patient on today's visit. We discussed a  multimodal and systematic approach to pain.  This includes diagnostic and therapeutic injections, adjuvant pharmacologic treatment, physical therapy, and at times psychiatry.  I emphasized the importance of regular exercise, core strengthening and stretching, diet and weight loss as a cornerstone of long-term pain management.    - This condition does not require this patient to take time off of work, and the primary goal of our Pain Management services is to improve the patient's functional capacity.  - Patient Questions: Answered all of the patient's questions regarding diagnoses, therapy, treatment and next steps        Silvia Buchanan MD  Interventional Pain Management  [facility]    Disclaimer:  This note was prepared using voice recognition system and is likely to have sound alike errors that may have been overlooked even after proof reading.  Please call me with any questions

## 2022-02-10 NOTE — H&P (VIEW-ONLY)
Established Patient Chronic Pain Note     Referring Physician: No ref. provider found    PCP: Mary Feliciano MD    Chief Complaint:   Chief Complaint   Patient presents with    Hip Pain     left    Knee Pain     left        SUBJECTIVE:  Interval history 02/14/2022  Patient presents status post left acetabulofemoral and sacroiliac joint injection 01/19/2022.  Patient reports 100% relief in left hip and sacroiliac joint pain lasting approximately 3 weeks.  Today patient reports insidious return of left hip pain.  Patient reports pain is rated 6/10.  Patient reports pain along the anterior aspect of the left hip which radiates into the left groin.  Patient denies radiculopathy past the knee or significant weakness in bilateral lower extremities.  Patient reports not get initiating physical therapy secondary to schedule conflict.    HPI 12/16/2021  Yoandy Forde is a 46 y.o. male with past medical history significant for hypertension, lupus erythematosus, Sjogren's syndrome, severe obesity, GERD, nicotine dependence who presents to the clinic for the evaluation of left-sided hip and groin pain.  Today patient reports pain began several months prior without inciting accident, injury or trauma.  Today patient reports pain which is intermittent and rated as a 6/10.  Patient reports pain which begins in the left hip territory and radiates into the left groin.  Pain is described as sharp and aching in nature.  Pain is exacerbated with reclining, sitting and with flexion abduction and external rotation of the left lower extremity.  Pain is also exacerbated with left foot inversion and eversion.  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  With prolonged standing which also exacerbates his pain.  Patient cannot recall any alleviating factors at this time.  Patient denies any right-sided pain, lower extremity weakness or bowel or bladder incontinence.    Patient denies night fever/night sweats, urinary incontinence,  bowel incontinence, significant weight loss, significant motor weakness and loss of sensations.    Pain Disability Index Review:     Last 3 PDI Scores 2/14/2022   Pain Disability Index (PDI) 34       Non-Pharmacologic Treatments:  Physical Therapy/Home Exercise: yes  Ice/Heat:yes  TENS: no  Acupuncture: no  Massage: no  Chiropractic: no    Other: no      Pain Medications:  - Opioids: Vicodin ( Hydrocodone/Acetaminophen)  - Adjuvant Medications: Cyclobenzaprine (Flexeril), Prednisone (Deltasone), Topical Ointment (Voltaren Gel, Steroid cream, Anti-Inflammatory Cream, Compound cream) and Wellbutrin (Buproprion)    Pain Procedures:   -01/19/2022:  Left-sided acetabular femoral and sacroiliac joint injection    Past Medical History:   Diagnosis Date    Hyperglycemia     Hypertension     Lupus erythematosus     MCTD (mixed connective tissue disease)     Dr Branham     Vitiligo      Past Surgical History:   Procedure Laterality Date    INJECTION OF ANESTHETIC AGENT INTO SACROILIAC JOINT Left 1/19/2022    Procedure: Left SIJ Injection  with RN IV sedation;  Surgeon: Silvia Buchanan MD;  Location: [facility];  Service: Pain Management;  Laterality: Left;    INJECTION OF JOINT Left 1/19/2022    Procedure: Left Hip injection by with RN IV sedation;  Surgeon: Silvia Buchanan MD;  Location: [facility];  Service: Pain Management;  Laterality: Left;    NASAL SEPTUM SURGERY      neck lipoma excision       Review of patient's allergies indicates:   Allergen Reactions    Shellfish containing products     Sulfa (sulfonamide antibiotics) Hives     Other reaction(s): Unknown    Sulfamethoxazole-trimethoprim Hives       Current Outpatient Medications   Medication Sig    amLODIPine (NORVASC) 10 MG tablet Take 1 tablet (10 mg total) by mouth once daily.    betamethasone dipropionate (DIPROLENE) 0.05 % cream Apply topically 2 (two) times daily. for 10 days    buPROPion (WELLBUTRIN) 75 MG tablet Take 1 tablet (75 mg total)  by mouth 2 (two) times daily.    cycloSPORINE (RESTASIS) 0.05 % ophthalmic emulsion Place 1 drop into both eyes 2 (two) times daily.    diclofenac sodium (PENNSAID) 20 mg/gram /actuation(2 %) sopm Apply 2 Pump topically 2 (two) times daily.    fenofibrate (TRICOR) 145 MG tablet TAKE 1 TABLET(145 MG) BY MOUTH EVERY DAY    fluticasone propionate (FLONASE) 50 mcg/actuation nasal spray 2 sprays (100 mcg total) by Each Nostril route once daily.    hydrOXYchloroQUINE (PLAQUENIL) 200 mg tablet Take 1 tablet (200 mg total) by mouth 2 (two) times daily.    tadalafiL (CIALIS) 5 MG tablet Take 2 tablets (10 mg total) by mouth daily as needed for Erectile Dysfunction.    predniSONE (DELTASONE) 5 MG tablet 20mg/d x 3 d 15mg/d x 3 d 10mg/d x 3 days for arthritis flares     Current Facility-Administered Medications   Medication    0.9%  NaCl infusion       Review of Systems     GENERAL:  No weight loss, malaise or fevers.  HEENT:   No recent changes in vision or hearing  NECK:  Negative for lumps, no difficulty with swallowing.  RESPIRATORY:  Negative for cough, wheezing or shortness of breath, patient denies any recent URI.  CARDIOVASCULAR:  Negative for chest pain, leg swelling or palpitations.  GI:  Negative for abdominal discomfort, blood in stools or black stools or change in bowel habits.  MUSCULOSKELETAL:  See HPI.  SKIN:  Negative for lesions, rash, and itching.  PSYCH:  No mood disorder or recent psychosocial stressors.   HEMATOLOGY/LYMPHOLOGY:  Negative for prolonged bleeding, bruising easily or swollen nodes.    NEURO:   No history of headaches, syncope, paralysis, seizures or tremors.  All other reviewed and negative other than HPI.    OBJECTIVE:    BP (!) 138/90   Pulse 87   Wt 111.2 kg (245 lb 2.4 oz)   BMI 34.19 kg/m²       Physical Exam    GENERAL: Well appearing, in no acute distress, alert and oriented x3.  PSYCH:  Mood and affect appropriate.  SKIN: Skin color, texture, turgor normal, no rashes or  lesions.  HEAD/FACE:  Normocephalic, atraumatic. Cranial nerves grossly intact.    CV: RRR with palpation of the radial artery.  PULM: No evidence of respiratory difficulty, symmetric chest rise.  GI:  Soft and non-tender.    BACK: Straight leg raising in the sitting and supine positions is negative to radicular pain. No pain to palpation over the facet joints of the lumbar spine or spinous processes. Normal range of motion without pain reproduction.  EXTREMITIES: Peripheral joint ROM is full and pain free without obvious instability or laxity in all four extremities. No deformities, edema, or skin discoloration. Good capillary refill.  MUSCULOSKELETAL: Able to stand on heels & toes.    hip provocative maneuvers are positive on left  pain with palpation over the sacroiliac joints on left.  FABERs test is positive on left.  FADIR test is negative bilaterally.   Bilateral upper and lower extremity strength is normal and symmetric.  No atrophy or tone abnormalities are noted.  RIGHT Lower extremity: Hip flexion 5/5, Hip Abduction 5/5, Hip Adduction 5/5, Knee extension 5/5, Knee flexion 5/5, Ankle dorsiflexion5/5, Extensor hallucis longus 5/5, Ankle plantarflexion 5/5  LEFT Lower extremity:  Hip flexion 5/5, Hip Abduction 5/5,Hip Adduction 5/5, Knee extension 5/5, Knee flexion 5/5, Ankle dorsiflexion 5/5, Extensor hallucis longus 5/5, Ankle plantarflexion 5/5  -Normal testing knee (patellar) jerk and ankle (achilles) jerk    NEURO: Bilateral upper and lower extremity coordination and muscle stretch reflexes are physiologic and symmetric. No loss of sensation is noted.  GAIT: normal.    Imagin16    X-Ray Lumbar Spine Complete 5 View    Narrative  Five views lumbar spine were obtained (AP, lateral, right oblique, left oblique and spot views).  5 views lumbar spines and demonstrate some sclerosis of the inferior sacroiliac joints bilaterally suggesting sacroiliitis.  Areas mild loss of disc space height at  L3/L4.  I do not see evidence of acute fracture or subluxation of the lumbar region.    Left hip MRI 06/14/2021  FINDINGS:  The T1 marrow signal of the visualized bony pelvis appears to be within normal limits.  The discs of the visualized lower lumbar spine are well hydrated.  The intrapelvic contents are unremarkable in appearance.     There is no evidence to suggest AVN or hip fracture.  No evidence to suggest a significant labral tear on this non arthrogram exam.  No significant joint effusion.  No evidence to suggest iliopsoas or trochanteric bursitis.  The musculature surrounding the left hip demonstrates normal signal.     Impression:     1. Essentially unremarkable MRI of the left hip.    MRI sacroiliac joint 06/14/2021  FINDINGS:  No abnormal STIR signal seen on either side of the SI joints.  No fluid or synovitis seen at either SI joint.  No abnormal enhancement is noted on the postcontrast images.  The sacral foramina are patent.  The T1 marrow signal of the visualized osseous structures is within normal limits.     Impression:     1. Normal MRI of the SI joints.    X-ray bilateral hip 06/14/2021  FINDINGS:  No fracture.  Femoral heads are seated within the acetabula bilaterally.  No femoral head avascular necrosis.  Hip joint spaces are preserved.  Mild lateral spurring of the acetabula bilaterally.  Mild heterotopic calcification adjacent to the left femur greater trochanter.  Sacroiliac joints are normal.  No osseous erosion or suspicious osseous lesion.       ASSESSMENT: 46 y.o. year old male with left-sided hip and groin pain, consistent with     1. Sacroiliitis  Ambulatory referral/consult to Physical/Occupational Therapy   2. Chronic left hip pain  IR Peripheral Nerve Injection    Case Request-RAD/Other Procedure Area: Left-sided femoral and obturator nerve block with RN IV sedation    Ambulatory referral/consult to Physical/Occupational Therapy         PLAN:   - Intervention schedule the patient  for left-sided femoral and obturator nerve block to see if this confers more significant and sustained relief.  We discussed with significant relief he may be a candidate for cooled radiofrequency ablation for more sustained relief. Explained the risks and benefits of the procedure in detail with the patient today in clinic along with alternative treatment options, and the patient elected to pursue the intervention at this time.      - Anticoagulation use: no no anticoagulation     -we have discussed repeating a left-sided sacroiliac joint injection in the future if sacroiliitis exacerbates.     report:  Reviewed and consistent with medication use as prescribed.    - Medications:  -Continue Nabumetone 500 mg BID. We have discussed potential deleterious side effects of NSAIDs on the cardiovascular, gastrointestinal and renal systems. We have discussed judicious use of this medication. Pt expresses understanding.     - Therapy:   We discussed initiating physical therapy to help manage the patient/s painful condition. The patient was counseled that muscle strengthening will improve the long term prognosis in regards to pain and may also help increase range of motion and mobility. They were told that one of the goals of physical therapy is that they learn how to do the exercises so that they can do them independently at home daily upon completion. The patient's questions were answered and they were agreeable to this course. A referral for external physical therapy was provided to the patient.      - Imaging: Reviewed available imaging with patient and answered any questions they had regarding study.    - Follow up visit: return to clinic in 4 weeks      The above plan and management options were discussed at length with patient. Patient is in agreement with the above and verbalized understanding.    - I discussed the goals of interventional chronic pain management with the patient on today's visit. We discussed a  multimodal and systematic approach to pain.  This includes diagnostic and therapeutic injections, adjuvant pharmacologic treatment, physical therapy, and at times psychiatry.  I emphasized the importance of regular exercise, core strengthening and stretching, diet and weight loss as a cornerstone of long-term pain management.    - This condition does not require this patient to take time off of work, and the primary goal of our Pain Management services is to improve the patient's functional capacity.  - Patient Questions: Answered all of the patient's questions regarding diagnoses, therapy, treatment and next steps        Silvia Buchanan MD  Interventional Pain Management  [facility]    Disclaimer:  This note was prepared using voice recognition system and is likely to have sound alike errors that may have been overlooked even after proof reading.  Please call me with any questions

## 2022-02-11 ENCOUNTER — TELEPHONE (OUTPATIENT)
Dept: ADMINISTRATIVE | Facility: OTHER | Age: 47
End: 2022-02-11
Payer: COMMERCIAL

## 2022-02-14 ENCOUNTER — TELEPHONE (OUTPATIENT)
Dept: PAIN MEDICINE | Facility: CLINIC | Age: 47
End: 2022-02-14

## 2022-02-14 ENCOUNTER — PATIENT OUTREACH (OUTPATIENT)
Dept: ADMINISTRATIVE | Facility: OTHER | Age: 47
End: 2022-02-14
Payer: COMMERCIAL

## 2022-02-14 ENCOUNTER — OFFICE VISIT (OUTPATIENT)
Dept: PAIN MEDICINE | Facility: CLINIC | Age: 47
End: 2022-02-14
Payer: COMMERCIAL

## 2022-02-14 VITALS
SYSTOLIC BLOOD PRESSURE: 138 MMHG | DIASTOLIC BLOOD PRESSURE: 90 MMHG | HEART RATE: 87 BPM | WEIGHT: 245.13 LBS | BODY MASS INDEX: 34.19 KG/M2

## 2022-02-14 DIAGNOSIS — M25.552 CHRONIC LEFT HIP PAIN: ICD-10-CM

## 2022-02-14 DIAGNOSIS — M46.1 SACROILIITIS: Primary | ICD-10-CM

## 2022-02-14 DIAGNOSIS — G89.29 CHRONIC LEFT HIP PAIN: ICD-10-CM

## 2022-02-14 DIAGNOSIS — Z12.11 ENCOUNTER FOR FIT (FECAL IMMUNOCHEMICAL TEST) SCREENING: Primary | ICD-10-CM

## 2022-02-14 PROCEDURE — 3008F BODY MASS INDEX DOCD: CPT | Mod: CPTII,S$GLB,, | Performed by: ANESTHESIOLOGY

## 2022-02-14 PROCEDURE — 3075F SYST BP GE 130 - 139MM HG: CPT | Mod: CPTII,S$GLB,, | Performed by: ANESTHESIOLOGY

## 2022-02-14 PROCEDURE — 99999 PR PBB SHADOW E&M-EST. PATIENT-LVL IV: ICD-10-PCS | Mod: PBBFAC,,, | Performed by: ANESTHESIOLOGY

## 2022-02-14 PROCEDURE — 3080F DIAST BP >= 90 MM HG: CPT | Mod: CPTII,S$GLB,, | Performed by: ANESTHESIOLOGY

## 2022-02-14 PROCEDURE — 1159F PR MEDICATION LIST DOCUMENTED IN MEDICAL RECORD: ICD-10-PCS | Mod: CPTII,S$GLB,, | Performed by: ANESTHESIOLOGY

## 2022-02-14 PROCEDURE — 1159F MED LIST DOCD IN RCRD: CPT | Mod: CPTII,S$GLB,, | Performed by: ANESTHESIOLOGY

## 2022-02-14 PROCEDURE — 3075F PR MOST RECENT SYSTOLIC BLOOD PRESS GE 130-139MM HG: ICD-10-PCS | Mod: CPTII,S$GLB,, | Performed by: ANESTHESIOLOGY

## 2022-02-14 PROCEDURE — 3080F PR MOST RECENT DIASTOLIC BLOOD PRESSURE >= 90 MM HG: ICD-10-PCS | Mod: CPTII,S$GLB,, | Performed by: ANESTHESIOLOGY

## 2022-02-14 PROCEDURE — 99999 PR PBB SHADOW E&M-EST. PATIENT-LVL IV: CPT | Mod: PBBFAC,,, | Performed by: ANESTHESIOLOGY

## 2022-02-14 PROCEDURE — 99214 OFFICE O/P EST MOD 30 MIN: CPT | Mod: S$GLB,,, | Performed by: ANESTHESIOLOGY

## 2022-02-14 PROCEDURE — 1160F RVW MEDS BY RX/DR IN RCRD: CPT | Mod: CPTII,S$GLB,, | Performed by: ANESTHESIOLOGY

## 2022-02-14 PROCEDURE — 3008F PR BODY MASS INDEX (BMI) DOCUMENTED: ICD-10-PCS | Mod: CPTII,S$GLB,, | Performed by: ANESTHESIOLOGY

## 2022-02-14 PROCEDURE — 1160F PR REVIEW ALL MEDS BY PRESCRIBER/CLIN PHARMACIST DOCUMENTED: ICD-10-PCS | Mod: CPTII,S$GLB,, | Performed by: ANESTHESIOLOGY

## 2022-02-14 PROCEDURE — 99214 PR OFFICE/OUTPT VISIT, EST, LEVL IV, 30-39 MIN: ICD-10-PCS | Mod: S$GLB,,, | Performed by: ANESTHESIOLOGY

## 2022-02-14 NOTE — TELEPHONE ENCOUNTER
The patient is NOT on any ASA or blood thinners.  No clearance is needed.  I went over the pre-procedure instructions with the patient and gave the patients a copy.  The patient verbalized understanding.  All questions answered.        I faxed over the orders to [facility].  Phone:  [phone]  Fax:  [phone] and [phone]  I received an email confirming that the fax was successfully sent.

## 2022-02-23 NOTE — PRE-PROCEDURE INSTRUCTIONS
Spoke with patient regarding procedure scheduled on 3.4     Arrival time 700     Has patient been sick with fever or on antibiotics within the last 7 days? No     Does the patient have any open wounds, sores or rashes? No     Does the patient have any recent fractures? no     Has patient received a vaccination within the last 7 days? No     Received the COVID vaccination?      Has the patient stopped all medications as directed? Na     Does patient have a pacemaker and or defibrillator? no     Does the patient have a ride to and from procedure and someone reliable to remain with patient? wife shilo     Is the patient diabetic? no     Does the patient have sleep apnea? Or use O2 at home? avinash     Is the patient receiving sedation? yes     Is the patient instructed to remain NPO beginning at midnight the night before their procedure? yes     Procedure location confirmed with patient? Yes     Covid- Denies signs/symptoms. Instructed to notify PAT/MD if any changes.

## 2022-03-03 ENCOUNTER — TELEPHONE (OUTPATIENT)
Dept: PAIN MEDICINE | Facility: CLINIC | Age: 47
End: 2022-03-03
Payer: COMMERCIAL

## 2022-03-03 ENCOUNTER — PATIENT MESSAGE (OUTPATIENT)
Dept: PAIN MEDICINE | Facility: CLINIC | Age: 47
End: 2022-03-03
Payer: COMMERCIAL

## 2022-03-03 NOTE — TELEPHONE ENCOUNTER
I spoke with the patient.  He states that his medication has taken affect and he is no longer in pain.  Cancelled his procedure that was scheduled on tomorrow.  He will keep his follow up with you to touch base and see how he is feeling in April.

## 2022-03-03 NOTE — TELEPHONE ENCOUNTER
----- Message from Tesha Kay sent at 3/3/2022  8:57 AM CST -----  Contact: Yoandy  Patient would like a call back at [phone] in regard to canceling his procedure that he has tomorrow. He states that he is not experiencing any pain at the moment that is the reason why he wants to cancel.    Thanks

## 2022-03-09 NOTE — TELEPHONE ENCOUNTER
I spoke with the patient and was able to get him reschedule for his procedure on Monday, 3/14/2022.  The patient verbalized understanding.  All questions answered.

## 2022-03-10 NOTE — PRE-PROCEDURE INSTRUCTIONS
Attempted to PAT patient for procedure on 3.14. with Dr. win. No answer. LVM with return phone number. No return call at this time.

## 2022-03-11 NOTE — PRE-PROCEDURE INSTRUCTIONS
Spoke with patient regarding procedure scheduled on 3.14     Arrival time 0740     Has patient been sick with fever or on antibiotics within the last 7 days? No     Does the patient have any open wounds, sores or rashes? No     Does the patient have any recent fractures? no     Has patient received a vaccination within the last 7 days? No     Has the patient stopped all medications as directed? na     Does patient have a pacemaker and or defibrillator? no     Does the patient have a ride to and from procedure and someone reliable to remain with patient? wife shyla     Is the patient diabetic? no     Does the patient have sleep apnea? Or use O2 at home? No and no      Is the patient receiving sedation? yes     Is the patient instructed to remain NPO beginning at midnight the night before their procedure? yes     Procedure location confirmed with patient? Yes     Covid- Denies signs/symptoms. Instructed to notify PAT/MD if any changes

## 2022-03-14 ENCOUNTER — HOSPITAL ENCOUNTER (OUTPATIENT)
Facility: HOSPITAL | Age: 47
Discharge: HOME | End: 2022-03-14
Attending: ANESTHESIOLOGY | Admitting: ANESTHESIOLOGY
Payer: COMMERCIAL

## 2022-03-14 VITALS
DIASTOLIC BLOOD PRESSURE: 91 MMHG | TEMPERATURE: 99 F | RESPIRATION RATE: 17 BRPM | SYSTOLIC BLOOD PRESSURE: 139 MMHG | BODY MASS INDEX: 33.83 KG/M2 | HEIGHT: 71 IN | WEIGHT: 241.63 LBS | OXYGEN SATURATION: 98 % | HEART RATE: 75 BPM

## 2022-03-14 DIAGNOSIS — M16.12 OSTEOARTHRITIS OF LEFT HIP: ICD-10-CM

## 2022-03-14 PROCEDURE — 25000003 PHARM REV CODE 250: Performed by: ANESTHESIOLOGY

## 2022-03-14 PROCEDURE — 64447 NJX AA&/STRD FEMORAL NRV IMG: CPT | Mod: LT,,, | Performed by: ANESTHESIOLOGY

## 2022-03-14 PROCEDURE — 64450 NJX AA&/STRD OTHER PN/BRANCH: CPT | Performed by: ANESTHESIOLOGY

## 2022-03-14 PROCEDURE — 64447 NJX AA&/STRD FEMORAL NRV IMG: CPT | Performed by: ANESTHESIOLOGY

## 2022-03-14 PROCEDURE — 25500020 PHARM REV CODE 255: Performed by: ANESTHESIOLOGY

## 2022-03-14 PROCEDURE — 63600175 PHARM REV CODE 636 W HCPCS: Performed by: ANESTHESIOLOGY

## 2022-03-14 PROCEDURE — 64447 PR NERVE BLOCK INJ, ANES/STEROID, FEMORAL, INCL IMAG GUIDANCE: ICD-10-PCS | Mod: LT,,, | Performed by: ANESTHESIOLOGY

## 2022-03-14 PROCEDURE — A9585 GADOBUTROL INJECTION: HCPCS | Performed by: ANESTHESIOLOGY

## 2022-03-14 RX ORDER — DEXAMETHASONE SODIUM PHOSPHATE 10 MG/ML
INJECTION INTRAMUSCULAR; INTRAVENOUS
Status: DISCONTINUED | OUTPATIENT
Start: 2022-03-14 | End: 2022-03-14 | Stop reason: HOSPADM

## 2022-03-14 RX ORDER — GADOBUTROL 604.72 MG/ML
INJECTION INTRAVENOUS
Status: DISCONTINUED | OUTPATIENT
Start: 2022-03-14 | End: 2022-03-14 | Stop reason: HOSPADM

## 2022-03-14 RX ORDER — FENTANYL CITRATE 50 UG/ML
INJECTION, SOLUTION INTRAMUSCULAR; INTRAVENOUS
Status: DISCONTINUED | OUTPATIENT
Start: 2022-03-14 | End: 2022-03-14 | Stop reason: HOSPADM

## 2022-03-14 RX ORDER — MIDAZOLAM HYDROCHLORIDE 1 MG/ML
INJECTION, SOLUTION INTRAMUSCULAR; INTRAVENOUS
Status: DISCONTINUED | OUTPATIENT
Start: 2022-03-14 | End: 2022-03-14 | Stop reason: HOSPADM

## 2022-03-14 RX ORDER — BUPIVACAINE HYDROCHLORIDE 2.5 MG/ML
INJECTION, SOLUTION EPIDURAL; INFILTRATION; INTRACAUDAL
Status: DISCONTINUED | OUTPATIENT
Start: 2022-03-14 | End: 2022-03-14 | Stop reason: HOSPADM

## 2022-03-14 NOTE — DISCHARGE SUMMARY
[facility] - Pain Mgmt 1st Fl  Discharge Note  Short Stay    Procedure(s) (LRB):  Left-sided femoral and obturator nerve block with RN IV sedation (Left)    OUTCOME: Patient tolerated treatment/procedure well without complication and is now ready for discharge.    DISPOSITION: Home or Self Care    FINAL DIAGNOSIS:  <principal problem not specified>    FOLLOWUP: In clinic    DISCHARGE INSTRUCTIONS:  No discharge procedures on file.     TIME SPENT ON DISCHARGE: 15 minutes

## 2022-03-14 NOTE — OP NOTE
Femoral and obturator sensory branch nerve block     Date of procedure: 03/14/2022     Time-out taken to identify patient and procedure side prior to starting the procedure.      PROCEDURE:   Femoral and 2) obturator sensory branch nerve block: diagnostic     REASON FOR PROCEDURE: Osteoarthritis of left hip, unspecified osteoarthritis type [M16.12]     PHYSICIAN: Silvia Buchanan MD     MEDICATIONS INJECTED: 1.5 mL Bupivacaine 0.25% and 10mg decadron at each site     LOCAL ANESTHETIC USED: Xylocaine 1% 5mL     SEDATION MEDICATIONS: None     ESTIMATED BLOOD LOSS: None.     COMPLICATIONS: None.     TECHNIQUE: Laying in the supine position, the patient was prepped and draped in the usual sterile fashion using ChloraPrep and fenestrated drape. The area was determined under fluoroscopy. Local Xylocaine was injected by raising a wheel and going down to the periosteum using a 27-gauge hypodermic needle. The 3.5 inch spinal needle was introduced into the approximate areas of the sensory branch of the femoral nerve to the hip, superior medially to the femoral head and the sensory branch of the obturator nerves to the hip at the incisura. The incisura needle was approached from the medial aspect of the thigh. Once os was contacted and the final needle tip position confirmed on fluoroscopy, contrast was was applied to confirm no intravascular placement.. After negative aspiration and no paresthesias there was injection of 1.5 mL of 0.25% bupivacaine and 20 mg decadron into each of these areas for a total volume of 3 mL of 0.25% bupivacaine left. Needle was withdrawn and a sterile band-aid applied to the skin.     Anesthesia:   Conscious sedation provided by M.D    The patient was monitored with continuous pulse oximetry, EKG, and intermittent blood pressure monitors.  The patient was hemodynamically stable throughout the entire process was responsive to voice, and breathing spontaneously.  Supplemental O2 was provided at 2L/min via  nasal cannula.  Patient was comfortable for the duration of the procedure. (See nurse documentation and case log for sedation time)    There was a total of 2mg IV Midazolam and 25mcg Fentanyl titrated for the procedure     Patient was then observed for hemodynamic stability in the recovery room for 30 minutes prior to discharge.

## 2022-03-14 NOTE — DISCHARGE INSTRUCTIONS

## 2022-03-18 ENCOUNTER — PATIENT MESSAGE (OUTPATIENT)
Dept: ADMINISTRATIVE | Facility: HOSPITAL | Age: 47
End: 2022-03-18
Payer: COMMERCIAL

## 2022-04-08 ENCOUNTER — PATIENT OUTREACH (OUTPATIENT)
Dept: ADMINISTRATIVE | Facility: OTHER | Age: 47
End: 2022-04-08
Payer: COMMERCIAL

## 2022-04-22 ENCOUNTER — TELEPHONE (OUTPATIENT)
Dept: PAIN MEDICINE | Facility: CLINIC | Age: 47
End: 2022-04-22
Payer: COMMERCIAL

## 2022-05-20 ENCOUNTER — PATIENT MESSAGE (OUTPATIENT)
Dept: RESEARCH | Facility: HOSPITAL | Age: 47
End: 2022-05-20
Payer: COMMERCIAL

## 2022-05-29 NOTE — TELEPHONE ENCOUNTER
Gave patient instructions per PA   Per Pharmacy, patient takes prandin and amaryl at home   - hold prandin and amaryl during inpatient   - A1C 9.3  - c/w patient on lantus 5 and increase mealtime to 4U; titrate to 100-180 FS  - c/w DIGNA/FS qac, qhs  - carb consistent low residual diet  - to convert to liquids if GI determines patient indicated for scope no Per Pharmacy, patient takes prandin and amaryl at home   - hold prandin and amaryl during inpatient   - A1C 9.3  - c/w patient on lantus 10U and increase mealtime to 4U; titrate to 100-180 FS  - c/w DIGNA/FS qac, qhs  - carb consistent low residual diet  - to convert to liquids if GI determines patient indicated for scope

## 2022-07-02 ENCOUNTER — LAB VISIT (OUTPATIENT)
Dept: LAB | Facility: HOSPITAL | Age: 47
End: 2022-07-02
Attending: PHYSICIAN ASSISTANT
Payer: COMMERCIAL

## 2022-07-02 DIAGNOSIS — M35.9 UNDIFFERENTIATED CONNECTIVE TISSUE DISEASE: ICD-10-CM

## 2022-07-02 DIAGNOSIS — L93.2 CUTANEOUS LUPUS ERYTHEMATOSUS: ICD-10-CM

## 2022-07-02 DIAGNOSIS — M35.01 SJOGREN'S SYNDROME WITH KERATOCONJUNCTIVITIS SICCA: ICD-10-CM

## 2022-07-02 LAB
ALBUMIN SERPL BCP-MCNC: 4.3 G/DL (ref 3.5–5.2)
ALP SERPL-CCNC: 59 U/L (ref 55–135)
ALT SERPL W/O P-5'-P-CCNC: 26 U/L (ref 10–44)
ANION GAP SERPL CALC-SCNC: 8 MMOL/L (ref 8–16)
AST SERPL-CCNC: 26 U/L (ref 10–40)
BASOPHILS # BLD AUTO: 0.05 K/UL (ref 0–0.2)
BASOPHILS NFR BLD: 1 % (ref 0–1.9)
BILIRUB SERPL-MCNC: 0.5 MG/DL (ref 0.1–1)
BUN SERPL-MCNC: 11 MG/DL (ref 6–20)
C3 SERPL-MCNC: 124 MG/DL (ref 50–180)
C4 SERPL-MCNC: 31 MG/DL (ref 11–44)
CALCIUM SERPL-MCNC: 9.4 MG/DL (ref 8.7–10.5)
CHLORIDE SERPL-SCNC: 107 MMOL/L (ref 95–110)
CO2 SERPL-SCNC: 26 MMOL/L (ref 23–29)
CREAT SERPL-MCNC: 1 MG/DL (ref 0.5–1.4)
CRP SERPL-MCNC: 2.1 MG/L (ref 0–8.2)
DIFFERENTIAL METHOD: ABNORMAL
EOSINOPHIL # BLD AUTO: 0.3 K/UL (ref 0–0.5)
EOSINOPHIL NFR BLD: 5.3 % (ref 0–8)
ERYTHROCYTE [DISTWIDTH] IN BLOOD BY AUTOMATED COUNT: 13.4 % (ref 11.5–14.5)
ERYTHROCYTE [SEDIMENTATION RATE] IN BLOOD BY PHOTOMETRIC METHOD: 2 MM/HR (ref 0–23)
EST. GFR  (AFRICAN AMERICAN): >60 ML/MIN/1.73 M^2
EST. GFR  (NON AFRICAN AMERICAN): >60 ML/MIN/1.73 M^2
GLUCOSE SERPL-MCNC: 78 MG/DL (ref 70–110)
HCT VFR BLD AUTO: 43.5 % (ref 40–54)
HGB BLD-MCNC: 15.1 G/DL (ref 14–18)
IMM GRANULOCYTES # BLD AUTO: 0.02 K/UL (ref 0–0.04)
IMM GRANULOCYTES NFR BLD AUTO: 0.4 % (ref 0–0.5)
LYMPHOCYTES # BLD AUTO: 1.8 K/UL (ref 1–4.8)
LYMPHOCYTES NFR BLD: 37.8 % (ref 18–48)
MCH RBC QN AUTO: 31.2 PG (ref 27–31)
MCHC RBC AUTO-ENTMCNC: 34.7 G/DL (ref 32–36)
MCV RBC AUTO: 90 FL (ref 82–98)
MONOCYTES # BLD AUTO: 0.6 K/UL (ref 0.3–1)
MONOCYTES NFR BLD: 11.7 % (ref 4–15)
NEUTROPHILS # BLD AUTO: 2.1 K/UL (ref 1.8–7.7)
NEUTROPHILS NFR BLD: 43.8 % (ref 38–73)
NRBC BLD-RTO: 0 /100 WBC
PLATELET # BLD AUTO: 250 K/UL (ref 150–450)
PMV BLD AUTO: 10.6 FL (ref 9.2–12.9)
POTASSIUM SERPL-SCNC: 4.3 MMOL/L (ref 3.5–5.1)
PROT SERPL-MCNC: 6.9 G/DL (ref 6–8.4)
RBC # BLD AUTO: 4.84 M/UL (ref 4.6–6.2)
SODIUM SERPL-SCNC: 141 MMOL/L (ref 136–145)
WBC # BLD AUTO: 4.87 K/UL (ref 3.9–12.7)

## 2022-07-02 PROCEDURE — 86140 C-REACTIVE PROTEIN: CPT | Performed by: PHYSICIAN ASSISTANT

## 2022-07-02 PROCEDURE — 80053 COMPREHEN METABOLIC PANEL: CPT | Performed by: PHYSICIAN ASSISTANT

## 2022-07-02 PROCEDURE — 36415 COLL VENOUS BLD VENIPUNCTURE: CPT | Performed by: PHYSICIAN ASSISTANT

## 2022-07-02 PROCEDURE — 86160 COMPLEMENT ANTIGEN: CPT | Performed by: PHYSICIAN ASSISTANT

## 2022-07-02 PROCEDURE — 85652 RBC SED RATE AUTOMATED: CPT | Performed by: PHYSICIAN ASSISTANT

## 2022-07-02 PROCEDURE — 85025 COMPLETE CBC W/AUTO DIFF WBC: CPT | Performed by: PHYSICIAN ASSISTANT

## 2022-07-02 PROCEDURE — 86160 COMPLEMENT ANTIGEN: CPT | Mod: 59 | Performed by: PHYSICIAN ASSISTANT

## 2022-07-02 PROCEDURE — 86225 DNA ANTIBODY NATIVE: CPT | Performed by: PHYSICIAN ASSISTANT

## 2022-07-05 LAB — DSDNA AB SER-ACNC: NORMAL [IU]/ML

## 2022-07-27 ENCOUNTER — PATIENT MESSAGE (OUTPATIENT)
Dept: ADMINISTRATIVE | Facility: HOSPITAL | Age: 47
End: 2022-07-27
Payer: COMMERCIAL

## 2022-08-31 DIAGNOSIS — M35.01 SJOGREN'S SYNDROME WITH KERATOCONJUNCTIVITIS SICCA: ICD-10-CM

## 2022-08-31 DIAGNOSIS — L93.2 CUTANEOUS LUPUS ERYTHEMATOSUS: Primary | ICD-10-CM

## 2022-08-31 NOTE — TELEPHONE ENCOUNTER
----- Message from Di Lind sent at 8/31/2022  8:36 AM CDT -----  Contact: [phone]  Type:  RX Refill Request    Who Called: Yoandy   Refill or New Rx:refill   RX Name and Strength:Meloxicam 10MG  How is the patient currently taking it? (ex. 1XDay):as needed   Is this a 30 day or 90 day RX:30  Preferred Pharmacy with phone number:  [id number] - [place] - [address] AT [facility]  [address] [address]  Phone: [phone] Fax: [phone]  Local or Mail Order:local  Ordering Provider:Janet Bennett   Would the patient rather a call back or a response via [facility]? Call back   Best Call Back Number:[phone]  Additional Information: pt states he have been experiencing pain

## 2022-08-31 NOTE — TELEPHONE ENCOUNTER
Pt is having foot pain flare ups.  Last meloxicam pill two to three days ago. Last appt 12/2021  Constant pain and sharp. Has tried OTC cream. Trouble sleeping. Patient is requesting a meloxicam refill until his appt with on 10/06.      Pharmacy is [facility] on [address] and [address]

## 2022-09-02 RX ORDER — MELOXICAM 15 MG/1
15 TABLET ORAL DAILY
Qty: 30 TABLET | Refills: 2 | Status: SHIPPED | OUTPATIENT
Start: 2022-09-02

## 2022-09-02 NOTE — TELEPHONE ENCOUNTER
Informed patient that his Rx was sent to his preferred pharmacy and should be ready for . Pt verbalized understanding and was grateful for the call. -MAG

## 2022-09-14 DIAGNOSIS — I10 ESSENTIAL HYPERTENSION: ICD-10-CM

## 2022-09-14 DIAGNOSIS — E78.2 MIXED HYPERLIPIDEMIA: ICD-10-CM

## 2022-09-14 NOTE — TELEPHONE ENCOUNTER
Care Due:                  Date            Visit Type   Department     Provider  --------------------------------------------------------------------------------                                ESTABLISHED                              PATIENT -    [facility]  [facility] Visit: 04-      [facility]       Feliciano  Next Visit: None Scheduled  None         None Found                                                            Last  Test          Frequency    Reason                     Performed    Due Date  --------------------------------------------------------------------------------    Office Visit  12 months..  amLODIPine, fenofibrate,   04- 04-                             tadalafiL................    Lipid Panel.  12 months..  fenofibrate..............  Not Found    Overdue    [employer] Embedded Care Gaps. Reference number: [id number]. 9/14/2022   5:46:11 AM CDT

## 2022-09-15 RX ORDER — AMLODIPINE BESYLATE 10 MG/1
TABLET ORAL
Qty: 90 TABLET | Refills: 1 | OUTPATIENT
Start: 2022-09-15

## 2022-09-15 RX ORDER — FENOFIBRATE 145 MG/1
TABLET, FILM COATED ORAL
Qty: 90 TABLET | Refills: 1 | OUTPATIENT
Start: 2022-09-15

## 2022-09-16 ENCOUNTER — LAB VISIT (OUTPATIENT)
Dept: LAB | Facility: HOSPITAL | Age: 47
End: 2022-09-16
Payer: COMMERCIAL

## 2022-09-16 ENCOUNTER — OFFICE VISIT (OUTPATIENT)
Dept: DERMATOLOGY | Facility: CLINIC | Age: 47
End: 2022-09-16
Payer: COMMERCIAL

## 2022-09-16 DIAGNOSIS — R21 BLISTERING RASH: ICD-10-CM

## 2022-09-16 DIAGNOSIS — L30.9 DERMATITIS: ICD-10-CM

## 2022-09-16 DIAGNOSIS — R21 BLISTERING RASH: Primary | ICD-10-CM

## 2022-09-16 DIAGNOSIS — L93.2 CUTANEOUS LUPUS ERYTHEMATOSUS: ICD-10-CM

## 2022-09-16 PROCEDURE — 99204 PR OFFICE/OUTPT VISIT, NEW, LEVL IV, 45-59 MIN: ICD-10-PCS | Mod: S$GLB,,, | Performed by: STUDENT IN AN ORGANIZED HEALTH CARE EDUCATION/TRAINING PROGRAM

## 2022-09-16 PROCEDURE — 1160F PR REVIEW ALL MEDS BY PRESCRIBER/CLIN PHARMACIST DOCUMENTED: ICD-10-PCS | Mod: CPTII,S$GLB,, | Performed by: STUDENT IN AN ORGANIZED HEALTH CARE EDUCATION/TRAINING PROGRAM

## 2022-09-16 PROCEDURE — 99999 PR PBB SHADOW E&M-EST. PATIENT-LVL III: CPT | Mod: PBBFAC,,, | Performed by: STUDENT IN AN ORGANIZED HEALTH CARE EDUCATION/TRAINING PROGRAM

## 2022-09-16 PROCEDURE — 87798 DETECT AGENT NOS DNA AMP: CPT | Performed by: STUDENT IN AN ORGANIZED HEALTH CARE EDUCATION/TRAINING PROGRAM

## 2022-09-16 PROCEDURE — 1160F RVW MEDS BY RX/DR IN RCRD: CPT | Mod: CPTII,S$GLB,, | Performed by: STUDENT IN AN ORGANIZED HEALTH CARE EDUCATION/TRAINING PROGRAM

## 2022-09-16 PROCEDURE — 1159F MED LIST DOCD IN RCRD: CPT | Mod: CPTII,S$GLB,, | Performed by: STUDENT IN AN ORGANIZED HEALTH CARE EDUCATION/TRAINING PROGRAM

## 2022-09-16 PROCEDURE — 99999 PR PBB SHADOW E&M-EST. PATIENT-LVL III: ICD-10-PCS | Mod: PBBFAC,,, | Performed by: STUDENT IN AN ORGANIZED HEALTH CARE EDUCATION/TRAINING PROGRAM

## 2022-09-16 PROCEDURE — 99204 OFFICE O/P NEW MOD 45 MIN: CPT | Mod: S$GLB,,, | Performed by: STUDENT IN AN ORGANIZED HEALTH CARE EDUCATION/TRAINING PROGRAM

## 2022-09-16 PROCEDURE — 1159F PR MEDICATION LIST DOCUMENTED IN MEDICAL RECORD: ICD-10-PCS | Mod: CPTII,S$GLB,, | Performed by: STUDENT IN AN ORGANIZED HEALTH CARE EDUCATION/TRAINING PROGRAM

## 2022-09-16 RX ORDER — TRIAMCINOLONE ACETONIDE 1 MG/G
OINTMENT TOPICAL
Qty: 80 G | Refills: 0 | Status: SHIPPED | OUTPATIENT
Start: 2022-09-16

## 2022-09-16 NOTE — PROGRESS NOTES
Subjective:       Patient ID:  Yoandy Forde is a 47 y.o. male who presents for   Chief Complaint   Patient presents with    Rash     Left arm. Raised areas with scabs. Pt reports having some itching and that it appears to be spreading.  Present a 1.5-2 weeks.Pt has a hx of connective tissue diease.  Pt has had dry patches before with itching but nothing like this before.      History of Present Illness: The patient presents with chief complaint of a rash.  Location: left upper arm  Duration: present for over a week  Signs/Symptoms: itchy clustered rash on the arm.  Prior treatments: none. Patient did work outside in yard prior to onset of rash. 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       Rash      Review of Systems   Constitutional:  Negative for fever and chills.   Skin:  Positive for rash.      Objective:    Physical Exam   Constitutional: He appears well-developed and well-nourished. No distress.   Neurological: He is alert and oriented to person, place, and time. He is not disoriented.   Psychiatric: He has a normal mood and affect.   Skin:   Areas Examined (abnormalities noted in diagram):   Head / Face Inspection Performed  Neck Inspection Performed  RUE Inspected  LUE Inspection Performed            Diagram Legend     Erythematous scaling macule/papule c/w actinic keratosis       Vascular papule c/w angioma      Pigmented verrucoid papule/plaque c/w seborrheic keratosis      Yellow umbilicated papule c/w sebaceous hyperplasia      Irregularly shaped tan macule c/w lentigo     1-2 mm smooth white papules consistent with Milia      Movable subcutaneous cyst with punctum c/w epidermal inclusion cyst      Subcutaneous movable cyst c/w pilar cyst      Firm pink to brown papule c/w dermatofibroma      Pedunculated fleshy papule(s) c/w skin tag(s)      Evenly pigmented macule c/w junctional nevus     Mildly variegated pigmented, slightly irregular-bordered macule c/w mildly atypical nevus      Flesh colored to  evenly pigmented papule c/w intradermal nevus       Pink pearly papule/plaque c/w basal cell carcinoma      Erythematous hyperkeratotic cursted plaque c/w SCC      Surgical scar with no sign of skin cancer recurrence      Open and closed comedones      Inflammatory papules and pustules      Verrucoid papule consistent consistent with wart     Erythematous eczematous patches and plaques     Dystrophic onycholytic nail with subungual debris c/w onychomycosis     Umbilicated papule    Erythematous-base heme-crusted tan verrucoid plaque consistent with inflamed seborrheic keratosis     Erythematous Silvery Scaling Plaque c/w Psoriasis     See annotation      Assessment / Plan:        Blistering rash/Dermatitis - small clustered area on the left upper arm. Allergic contact vs possible VZV. Will obtain viral culture to r/o shingles. For now, start TAC to the area for itching. Keep area covered.   -     Varicella Zoster By Rapid Pcr Tissue; Future; Expected date: 09/16/2022  -     triamcinolone acetonide 0.1% (KENALOG) 0.1 % ointment; AAA bid  Dispense: 80 g; Refill: 0             Follow up in about 8 weeks (around 11/11/2022), or if symptoms worsen or fail to improve.

## 2022-09-19 ENCOUNTER — PATIENT MESSAGE (OUTPATIENT)
Dept: ADMINISTRATIVE | Facility: HOSPITAL | Age: 47
End: 2022-09-19
Payer: COMMERCIAL

## 2022-09-19 DIAGNOSIS — Z12.11 SCREENING FOR COLON CANCER: ICD-10-CM

## 2022-09-20 LAB
SPECIMEN SOURCE: NORMAL
VARICELLA ZOSTER BY PCR RESULT: NEGATIVE

## 2022-10-01 ENCOUNTER — LAB VISIT (OUTPATIENT)
Dept: LAB | Facility: HOSPITAL | Age: 47
End: 2022-10-01
Attending: PHYSICIAN ASSISTANT
Payer: COMMERCIAL

## 2022-10-01 DIAGNOSIS — L93.2 CUTANEOUS LUPUS ERYTHEMATOSUS: ICD-10-CM

## 2022-10-01 DIAGNOSIS — M35.01 SJOGREN'S SYNDROME WITH KERATOCONJUNCTIVITIS SICCA: ICD-10-CM

## 2022-10-01 DIAGNOSIS — M35.9 UNDIFFERENTIATED CONNECTIVE TISSUE DISEASE: ICD-10-CM

## 2022-10-01 LAB
ALBUMIN SERPL BCP-MCNC: 4.3 G/DL (ref 3.5–5.2)
ALP SERPL-CCNC: 60 U/L (ref 55–135)
ALT SERPL W/O P-5'-P-CCNC: 24 U/L (ref 10–44)
ANION GAP SERPL CALC-SCNC: 12 MMOL/L (ref 8–16)
AST SERPL-CCNC: 28 U/L (ref 10–40)
BASOPHILS # BLD AUTO: 0.06 K/UL (ref 0–0.2)
BASOPHILS NFR BLD: 1.1 % (ref 0–1.9)
BILIRUB SERPL-MCNC: 0.3 MG/DL (ref 0.1–1)
BUN SERPL-MCNC: 13 MG/DL (ref 6–20)
CALCIUM SERPL-MCNC: 9.5 MG/DL (ref 8.7–10.5)
CHLORIDE SERPL-SCNC: 106 MMOL/L (ref 95–110)
CO2 SERPL-SCNC: 21 MMOL/L (ref 23–29)
CREAT SERPL-MCNC: 1.1 MG/DL (ref 0.5–1.4)
CRP SERPL-MCNC: 2.5 MG/L (ref 0–8.2)
DIFFERENTIAL METHOD: ABNORMAL
EOSINOPHIL # BLD AUTO: 0.3 K/UL (ref 0–0.5)
EOSINOPHIL NFR BLD: 5.1 % (ref 0–8)
ERYTHROCYTE [DISTWIDTH] IN BLOOD BY AUTOMATED COUNT: 13.4 % (ref 11.5–14.5)
ERYTHROCYTE [SEDIMENTATION RATE] IN BLOOD BY PHOTOMETRIC METHOD: 3 MM/HR (ref 0–23)
EST. GFR  (NO RACE VARIABLE): >60 ML/MIN/1.73 M^2
GLUCOSE SERPL-MCNC: 86 MG/DL (ref 70–110)
HCT VFR BLD AUTO: 43.4 % (ref 40–54)
HGB BLD-MCNC: 14.9 G/DL (ref 14–18)
IMM GRANULOCYTES # BLD AUTO: 0.01 K/UL (ref 0–0.04)
IMM GRANULOCYTES NFR BLD AUTO: 0.2 % (ref 0–0.5)
LYMPHOCYTES # BLD AUTO: 2.3 K/UL (ref 1–4.8)
LYMPHOCYTES NFR BLD: 42.8 % (ref 18–48)
MCH RBC QN AUTO: 30.7 PG (ref 27–31)
MCHC RBC AUTO-ENTMCNC: 34.3 G/DL (ref 32–36)
MCV RBC AUTO: 90 FL (ref 82–98)
MONOCYTES # BLD AUTO: 0.8 K/UL (ref 0.3–1)
MONOCYTES NFR BLD: 13.9 % (ref 4–15)
NEUTROPHILS # BLD AUTO: 2 K/UL (ref 1.8–7.7)
NEUTROPHILS NFR BLD: 36.9 % (ref 38–73)
NRBC BLD-RTO: 0 /100 WBC
PLATELET # BLD AUTO: 269 K/UL (ref 150–450)
PMV BLD AUTO: 9.9 FL (ref 9.2–12.9)
POTASSIUM SERPL-SCNC: 4.2 MMOL/L (ref 3.5–5.1)
PROT SERPL-MCNC: 7 G/DL (ref 6–8.4)
RBC # BLD AUTO: 4.85 M/UL (ref 4.6–6.2)
SODIUM SERPL-SCNC: 139 MMOL/L (ref 136–145)
WBC # BLD AUTO: 5.47 K/UL (ref 3.9–12.7)

## 2022-10-01 PROCEDURE — 80053 COMPREHEN METABOLIC PANEL: CPT | Performed by: PHYSICIAN ASSISTANT

## 2022-10-01 PROCEDURE — 86140 C-REACTIVE PROTEIN: CPT | Performed by: PHYSICIAN ASSISTANT

## 2022-10-01 PROCEDURE — 85025 COMPLETE CBC W/AUTO DIFF WBC: CPT | Performed by: PHYSICIAN ASSISTANT

## 2022-10-01 PROCEDURE — 36415 COLL VENOUS BLD VENIPUNCTURE: CPT | Performed by: PHYSICIAN ASSISTANT

## 2022-10-01 PROCEDURE — 85652 RBC SED RATE AUTOMATED: CPT | Performed by: PHYSICIAN ASSISTANT

## 2022-10-06 ENCOUNTER — OFFICE VISIT (OUTPATIENT)
Dept: RHEUMATOLOGY | Facility: CLINIC | Age: 47
End: 2022-10-06
Payer: COMMERCIAL

## 2022-10-06 VITALS
BODY MASS INDEX: 34.69 KG/M2 | HEIGHT: 71 IN | DIASTOLIC BLOOD PRESSURE: 83 MMHG | WEIGHT: 247.81 LBS | HEART RATE: 89 BPM | SYSTOLIC BLOOD PRESSURE: 131 MMHG

## 2022-10-06 DIAGNOSIS — L93.2 CUTANEOUS LUPUS ERYTHEMATOSUS: Primary | ICD-10-CM

## 2022-10-06 DIAGNOSIS — M35.01 SJOGREN'S SYNDROME WITH KERATOCONJUNCTIVITIS SICCA: ICD-10-CM

## 2022-10-06 DIAGNOSIS — M16.12 PRIMARY OSTEOARTHRITIS OF LEFT HIP: ICD-10-CM

## 2022-10-06 DIAGNOSIS — Z79.899 HIGH RISK MEDICATION USE: ICD-10-CM

## 2022-10-06 DIAGNOSIS — Z51.81 MEDICATION MONITORING ENCOUNTER: ICD-10-CM

## 2022-10-06 PROCEDURE — 3075F SYST BP GE 130 - 139MM HG: CPT | Mod: CPTII,S$GLB,, | Performed by: PHYSICIAN ASSISTANT

## 2022-10-06 PROCEDURE — 99215 PR OFFICE/OUTPT VISIT, EST, LEVL V, 40-54 MIN: ICD-10-PCS | Mod: S$GLB,,, | Performed by: PHYSICIAN ASSISTANT

## 2022-10-06 PROCEDURE — 3079F PR MOST RECENT DIASTOLIC BLOOD PRESSURE 80-89 MM HG: ICD-10-PCS | Mod: CPTII,S$GLB,, | Performed by: PHYSICIAN ASSISTANT

## 2022-10-06 PROCEDURE — 3008F PR BODY MASS INDEX (BMI) DOCUMENTED: ICD-10-PCS | Mod: CPTII,S$GLB,, | Performed by: PHYSICIAN ASSISTANT

## 2022-10-06 PROCEDURE — 99999 PR PBB SHADOW E&M-EST. PATIENT-LVL III: CPT | Mod: PBBFAC,,, | Performed by: PHYSICIAN ASSISTANT

## 2022-10-06 PROCEDURE — 1159F PR MEDICATION LIST DOCUMENTED IN MEDICAL RECORD: ICD-10-PCS | Mod: CPTII,S$GLB,, | Performed by: PHYSICIAN ASSISTANT

## 2022-10-06 PROCEDURE — 1160F PR REVIEW ALL MEDS BY PRESCRIBER/CLIN PHARMACIST DOCUMENTED: ICD-10-PCS | Mod: CPTII,S$GLB,, | Performed by: PHYSICIAN ASSISTANT

## 2022-10-06 PROCEDURE — 3008F BODY MASS INDEX DOCD: CPT | Mod: CPTII,S$GLB,, | Performed by: PHYSICIAN ASSISTANT

## 2022-10-06 PROCEDURE — 1160F RVW MEDS BY RX/DR IN RCRD: CPT | Mod: CPTII,S$GLB,, | Performed by: PHYSICIAN ASSISTANT

## 2022-10-06 PROCEDURE — 99999 PR PBB SHADOW E&M-EST. PATIENT-LVL III: ICD-10-PCS | Mod: PBBFAC,,, | Performed by: PHYSICIAN ASSISTANT

## 2022-10-06 PROCEDURE — 1159F MED LIST DOCD IN RCRD: CPT | Mod: CPTII,S$GLB,, | Performed by: PHYSICIAN ASSISTANT

## 2022-10-06 PROCEDURE — 99215 OFFICE O/P EST HI 40 MIN: CPT | Mod: S$GLB,,, | Performed by: PHYSICIAN ASSISTANT

## 2022-10-06 PROCEDURE — 3079F DIAST BP 80-89 MM HG: CPT | Mod: CPTII,S$GLB,, | Performed by: PHYSICIAN ASSISTANT

## 2022-10-06 PROCEDURE — 3075F PR MOST RECENT SYSTOLIC BLOOD PRESS GE 130-139MM HG: ICD-10-PCS | Mod: CPTII,S$GLB,, | Performed by: PHYSICIAN ASSISTANT

## 2022-10-06 NOTE — PROGRESS NOTES
RHEUMATOLOGY OUTPATIENT CLINIC NOTE    10/6/2022    Attending Rheumatologist: Janet Bennett PA-C  Primary Care Provider: Mary Feliciano MD   Chief Complaint/Reason For Consultation:  Lupus      Subjective:        Yoandy Forde is a 47 y.o. male here today for routine follow up of cutaneous lupus and sjogrens. H/o +DONG and +SSA with bx suggestive of cutaneous lupus.  H/o dry eyes and mouth.  Uses systane drops. He takes hcq bid Most recent DONG have been negative. He has chronic mid back pain, used mobic and flexeril in the past. Seeing a chiropractor for left hip pain. He has occ knee and wrist pain, denies swelling of joints. He is overdue for eye exam at this time. Rheumatologic systems otherwise negative. Physical exam shows no skin lesions or rashes, no synovitis to bilateral hands. No dactylitis.    Last Eye Exam: 4/24/21    Serologies    Lab Results   Component Value Date    RF <10.0 09/07/2017     Lab Results   Component Value Date    HEPAIGM Negative 02/03/2009    HEPBIGM Negative 02/03/2009    HEPCAB Negative 02/03/2009        Current Rheum Medications:   mg BID, meloxicam  Previous Rheum Medications:   N/a  History of Spinal Disorders  C-Spine:  L-Spine:  Pain Management History:  Sees [facility]      Past Medical History:   Diagnosis Date    Hyperglycemia     Hypertension     Lupus erythematosus     MCTD (mixed connective tissue disease)     Dr Branham     Vitiligo      Social History     Socioeconomic History    Marital status:      Spouse name: ASHWIN    Number of children: 2   Occupational History    Occupation: [employer]     Employer: [employer]   Tobacco Use    Smoking status: Some Days     Packs/day: 0.50     Years: 15.00     Pack years: 7.50     Types: Cigarettes     Start date: 4/14/1995    Smokeless tobacco: Never   Substance and Sexual Activity    Alcohol use: Yes     Alcohol/week: 0.0 standard drinks     Comment: occasional use     Review of patient's allergies indicates:  "  Allergen Reactions    Shellfish containing products     Insect venom     Sulfa (sulfonamide antibiotics) Hives     Other reaction(s): Unknown    Sulfamethoxazole-trimethoprim Hives       Objective:   /83   Pulse 89   Ht 5' 11" (1.803 m)   Wt 112.4 kg (247 lb 12.8 oz)   BMI 34.56 kg/m²     Immunization History   Administered Date(s) Administered    COVID-19, MRNA, LN-S, PF (MODERNA FULL 0.5 ML DOSE) 03/31/2021    DTP 1973, 1973, 1973, 11/12/1974    Influenza 02/05/2020    Influenza - Quadrivalent - PF *Preferred* (6 months and older) 10/23/2015, 09/20/2017, 01/14/2019    MMR 01/11/1974, 04/30/1974, 01/22/2003    OPV 1973, 1973, 1973, 11/02/1974    Td (ADULT) 03/20/1992, 01/22/2003    Tdap 08/23/2016       Current Outpatient Medications:     amLODIPine (NORVASC) 10 MG tablet, Take 1 tablet (10 mg total) by mouth once daily., Disp: 90 tablet, Rfl: 1    buPROPion (WELLBUTRIN) 75 MG tablet, Take 1 tablet (75 mg total) by mouth 2 (two) times daily., Disp: 60 tablet, Rfl: 2    cycloSPORINE (RESTASIS) 0.05 % ophthalmic emulsion, Place 1 drop into both eyes 2 (two) times daily., Disp: 60 vial, Rfl: 12    diclofenac sodium (PENNSAID) 20 mg/gram /actuation(2 %) sopm, Apply 2 Pump topically 2 (two) times daily., Disp: 1 Bottle, Rfl: 6    fenofibrate (TRICOR) 145 MG tablet, TAKE 1 TABLET(145 MG) BY MOUTH EVERY DAY, Disp: 90 tablet, Rfl: 1    fluticasone propionate (FLONASE) 50 mcg/actuation nasal spray, 2 sprays (100 mcg total) by Each Nostril route once daily., Disp: 16 g, Rfl: 6    hydrOXYchloroQUINE (PLAQUENIL) 200 mg tablet, Take 1 tablet (200 mg total) by mouth 2 (two) times daily., Disp: 60 tablet, Rfl: 2    meloxicam (MOBIC) 15 MG tablet, Take 1 tablet (15 mg total) by mouth once daily., Disp: 30 tablet, Rfl: 2    predniSONE (DELTASONE) 5 MG tablet, 20mg/d x 3 d 15mg/d x 3 d 10mg/d x 3 days for arthritis flares, Disp: 90 tablet, Rfl: 1    tadalafiL (CIALIS) 5 MG tablet, Take " 2 tablets (10 mg total) by mouth daily as needed for Erectile Dysfunction., Disp: 30 tablet, Rfl: 0    triamcinolone acetonide 0.1% (KENALOG) 0.1 % ointment, AAA bid, Disp: 80 g, Rfl: 0    betamethasone dipropionate (DIPROLENE) 0.05 % cream, Apply topically 2 (two) times daily. for 10 days, Disp: 15 g, Rfl: 0  No current facility-administered medications for this visit.       Recent Results (from the past 336 hour(s))   CBC Auto Differential    Collection Time: 10/01/22 11:36 AM   Result Value Ref Range    WBC 5.47 3.90 - 12.70 K/uL    RBC 4.85 4.60 - 6.20 M/uL    Hemoglobin 14.9 14.0 - 18.0 g/dL    Hematocrit 43.4 40.0 - 54.0 %    MCV 90 82 - 98 fL    MCH 30.7 27.0 - 31.0 pg    MCHC 34.3 32.0 - 36.0 g/dL    RDW 13.4 11.5 - 14.5 %    Platelets 269 150 - 450 K/uL    MPV 9.9 9.2 - 12.9 fL    Immature Granulocytes 0.2 0.0 - 0.5 %    Gran # (ANC) 2.0 1.8 - 7.7 K/uL    Immature Grans (Abs) 0.01 0.00 - 0.04 K/uL    Lymph # 2.3 1.0 - 4.8 K/uL    Mono # 0.8 0.3 - 1.0 K/uL    Eos # 0.3 0.0 - 0.5 K/uL    Baso # 0.06 0.00 - 0.20 K/uL    nRBC 0 0 /100 WBC    Gran % 36.9 (L) 38.0 - 73.0 %    Lymph % 42.8 18.0 - 48.0 %    Mono % 13.9 4.0 - 15.0 %    Eosinophil % 5.1 0.0 - 8.0 %    Basophil % 1.1 0.0 - 1.9 %    Differential Method Automated    Comprehensive Metabolic Panel    Collection Time: 10/01/22 11:36 AM   Result Value Ref Range    Sodium 139 136 - 145 mmol/L    Potassium 4.2 3.5 - 5.1 mmol/L    Chloride 106 95 - 110 mmol/L    CO2 21 (L) 23 - 29 mmol/L    Glucose 86 70 - 110 mg/dL    BUN 13 6 - 20 mg/dL    Creatinine 1.1 0.5 - 1.4 mg/dL    Calcium 9.5 8.7 - 10.5 mg/dL    Total Protein 7.0 6.0 - 8.4 g/dL    Albumin 4.3 3.5 - 5.2 g/dL    Total Bilirubin 0.3 0.1 - 1.0 mg/dL    Alkaline Phosphatase 60 55 - 135 U/L    AST 28 10 - 40 U/L    ALT 24 10 - 44 U/L    Anion Gap 12 8 - 16 mmol/L    eGFR >60 >60 mL/min/1.73 m^2   C-Reactive Protein    Collection Time: 10/01/22 11:36 AM   Result Value Ref Range    CRP 2.5 0.0 - 8.2 mg/L    Sedimentation rate    Collection Time: 10/01/22 11:36 AM   Result Value Ref Range    Sed Rate 3 0 - 23 mm/Hr         Imaging:  All imaging reviewed and independently interpreted by me.    Assessment:     1. Cutaneous lupus erythematosus    2. Sjogren's syndrome with keratoconjunctivitis sicca    3. Medication monitoring encounter    4. High risk medication use    5. Primary osteoarthritis of left hip          Plan:       Update eye exam ASAP; consider renewal of restasis for dry eye  No active cutaneous lupus; cont  mg BID  IAC for knees offered today- pt wishes to defer at this time  Patient understands and contact clinic at any time regarding questions about today's office visit and any medication changes that were made  Return to clinic: 6 mos  Next Labs: before next OV    The patient understands, chooses and consents to this plan and accepts all the risks which include but are not limited to the risks mentioned above.     Method of contact with patient concerns: Colette attn Rheumatology    40 minutes of total time spent on the encounter, which includes face to face time and non-face to face time preparing to see the patient (eg, review of tests), Obtaining and/or reviewing separately obtained history, Documenting clinical information in the electronic or other health record, Independently interpreting results (not separately reported) and communicating results to the patient/family/caregiver, or Care coordination (not separately reported).          Disclaimer: This note was prepared using a voice recognition system and is likely to have sound alike errors within the text.       Janet Bennett PA-C  Rheumatology Department   [facility] [place]

## 2022-10-07 ENCOUNTER — PATIENT OUTREACH (OUTPATIENT)
Dept: ADMINISTRATIVE | Facility: HOSPITAL | Age: 47
End: 2022-10-07
Payer: COMMERCIAL

## 2022-10-07 DIAGNOSIS — I10 ESSENTIAL HYPERTENSION: ICD-10-CM

## 2022-10-07 LAB — HEMOCCULT STL QL IA: NEGATIVE

## 2022-10-07 NOTE — TELEPHONE ENCOUNTER
No new care gaps identified.  [facility] Embedded Care Gaps. Reference number: [id number]. 10/07/2022   4:44:16 PM CDT

## 2022-10-07 NOTE — TELEPHONE ENCOUNTER
----- Message from Yanira Salazar sent at 10/7/2022  4:02 PM CDT -----  Contact: Yoandy  .Type:  RX Refill Request    Who Called: Yoandy   Refill or New Rx:refill  RX Name and Strength:amLODIPine (NORVASC) 10 MG tablet  How is the patient currently taking it? (ex. 1XDay):as prescribed   Is this a 30 day or 90 day RX:30  Preferred Pharmacy with phone number:.  [id number] - [place] - [address] AT [facility]  [address]  [place] [place] [address]  Phone: [phone] Fax: [phone]  Local or Mail Order:local  Ordering Provider:Dr. Feliciano  Would the patient rather a call back or a response via [facility]? call  Best Call Back Number:.[phone]  Additional Information: patient stated have appointment on 10/31/2022 and took last pill on yesterday 10/06/2022.  Thanks  LR

## 2022-10-09 RX ORDER — AMLODIPINE BESYLATE 10 MG/1
10 TABLET ORAL DAILY
Qty: 90 TABLET | Refills: 1 | OUTPATIENT
Start: 2022-10-09

## 2022-10-10 DIAGNOSIS — I10 ESSENTIAL HYPERTENSION: ICD-10-CM

## 2022-10-10 RX ORDER — AMLODIPINE BESYLATE 10 MG/1
10 TABLET ORAL DAILY
Qty: 30 TABLET | Refills: 0 | Status: SHIPPED | OUTPATIENT
Start: 2022-10-10 | End: 2022-10-31 | Stop reason: SDUPTHER

## 2022-10-10 NOTE — TELEPHONE ENCOUNTER
----- Message from Leelee Staton sent at 10/10/2022 12:13 PM CDT -----  Contact: [phone]  Pt is coming in to see you 10/31/22 for his annual. He needs a refill of amlodipine (NORVASC) 10 MG tablet and pharmacy told him it was denied. He would like to know why it was denied? Please call pt.

## 2022-10-10 NOTE — TELEPHONE ENCOUNTER
No new care gaps identified.  [facility] Embedded Care Gaps. Reference number: [id number]. 10/10/2022   12:25:59 PM CDT

## 2022-10-20 ENCOUNTER — PATIENT MESSAGE (OUTPATIENT)
Dept: ADMINISTRATIVE | Facility: HOSPITAL | Age: 47
End: 2022-10-20
Payer: COMMERCIAL

## 2022-10-31 ENCOUNTER — OFFICE VISIT (OUTPATIENT)
Dept: INTERNAL MEDICINE | Facility: CLINIC | Age: 47
End: 2022-10-31
Payer: COMMERCIAL

## 2022-10-31 VITALS
WEIGHT: 246.25 LBS | SYSTOLIC BLOOD PRESSURE: 139 MMHG | TEMPERATURE: 98 F | OXYGEN SATURATION: 99 % | RESPIRATION RATE: 20 BRPM | HEART RATE: 93 BPM | DIASTOLIC BLOOD PRESSURE: 78 MMHG | BODY MASS INDEX: 34.48 KG/M2 | HEIGHT: 71 IN

## 2022-10-31 DIAGNOSIS — M35.9 UNDIFFERENTIATED CONNECTIVE TISSUE DISEASE: ICD-10-CM

## 2022-10-31 DIAGNOSIS — I10 ESSENTIAL HYPERTENSION: ICD-10-CM

## 2022-10-31 DIAGNOSIS — E78.2 MIXED HYPERLIPIDEMIA: ICD-10-CM

## 2022-10-31 DIAGNOSIS — K21.9 GASTROESOPHAGEAL REFLUX DISEASE, UNSPECIFIED WHETHER ESOPHAGITIS PRESENT: ICD-10-CM

## 2022-10-31 DIAGNOSIS — E55.9 VITAMIN D DEFICIENCY: ICD-10-CM

## 2022-10-31 DIAGNOSIS — E66.9 OBESITY (BMI 30.0-34.9): ICD-10-CM

## 2022-10-31 DIAGNOSIS — M35.01 SJOGREN'S SYNDROME WITH KERATOCONJUNCTIVITIS SICCA: ICD-10-CM

## 2022-10-31 DIAGNOSIS — M16.12 PRIMARY OSTEOARTHRITIS OF LEFT HIP: ICD-10-CM

## 2022-10-31 DIAGNOSIS — Z00.00 ROUTINE GENERAL MEDICAL EXAMINATION AT A HEALTH CARE FACILITY: Primary | ICD-10-CM

## 2022-10-31 DIAGNOSIS — Z72.0 TOBACCO ABUSE: ICD-10-CM

## 2022-10-31 PROBLEM — M25.559 HIP PAIN: Status: RESOLVED | Noted: 2022-01-19 | Resolved: 2022-10-31

## 2022-10-31 PROBLEM — M46.1 SACROILIITIS: Status: RESOLVED | Noted: 2022-01-19 | Resolved: 2022-10-31

## 2022-10-31 PROBLEM — E66.01 SEVERE OBESITY (BMI 35.0-39.9) WITH COMORBIDITY: Status: RESOLVED | Noted: 2020-02-04 | Resolved: 2022-10-31

## 2022-10-31 PROCEDURE — 1159F MED LIST DOCD IN RCRD: CPT | Mod: CPTII,S$GLB,, | Performed by: FAMILY MEDICINE

## 2022-10-31 PROCEDURE — 99999 PR PBB SHADOW E&M-EST. PATIENT-LVL IV: ICD-10-PCS | Mod: PBBFAC,,, | Performed by: FAMILY MEDICINE

## 2022-10-31 PROCEDURE — 99396 PREV VISIT EST AGE 40-64: CPT | Mod: S$GLB,,, | Performed by: FAMILY MEDICINE

## 2022-10-31 PROCEDURE — 1160F RVW MEDS BY RX/DR IN RCRD: CPT | Mod: CPTII,S$GLB,, | Performed by: FAMILY MEDICINE

## 2022-10-31 PROCEDURE — 3075F SYST BP GE 130 - 139MM HG: CPT | Mod: CPTII,S$GLB,, | Performed by: FAMILY MEDICINE

## 2022-10-31 PROCEDURE — 1159F PR MEDICATION LIST DOCUMENTED IN MEDICAL RECORD: ICD-10-PCS | Mod: CPTII,S$GLB,, | Performed by: FAMILY MEDICINE

## 2022-10-31 PROCEDURE — 3075F PR MOST RECENT SYSTOLIC BLOOD PRESS GE 130-139MM HG: ICD-10-PCS | Mod: CPTII,S$GLB,, | Performed by: FAMILY MEDICINE

## 2022-10-31 PROCEDURE — 99396 PR PREVENTIVE VISIT,EST,40-64: ICD-10-PCS | Mod: S$GLB,,, | Performed by: FAMILY MEDICINE

## 2022-10-31 PROCEDURE — 3078F DIAST BP <80 MM HG: CPT | Mod: CPTII,S$GLB,, | Performed by: FAMILY MEDICINE

## 2022-10-31 PROCEDURE — 99999 PR PBB SHADOW E&M-EST. PATIENT-LVL IV: CPT | Mod: PBBFAC,,, | Performed by: FAMILY MEDICINE

## 2022-10-31 PROCEDURE — 3078F PR MOST RECENT DIASTOLIC BLOOD PRESSURE < 80 MM HG: ICD-10-PCS | Mod: CPTII,S$GLB,, | Performed by: FAMILY MEDICINE

## 2022-10-31 PROCEDURE — 1160F PR REVIEW ALL MEDS BY PRESCRIBER/CLIN PHARMACIST DOCUMENTED: ICD-10-PCS | Mod: CPTII,S$GLB,, | Performed by: FAMILY MEDICINE

## 2022-10-31 RX ORDER — AMLODIPINE BESYLATE 10 MG/1
10 TABLET ORAL DAILY
Qty: 90 TABLET | Refills: 1 | Status: SHIPPED | OUTPATIENT
Start: 2022-10-31 | End: 2023-04-02

## 2022-10-31 NOTE — PROGRESS NOTES
"Subjective:       Patient ID: Yoandy Forde is a 47 y.o. male.    Chief Complaint: Annual Exam    47-year-old  male patient with Patient Active Problem List:     Dermatophytosis of other specified sites     Undifferentiated connective tissue disease     Essential hypertension     Hemorrhoids     GERD (gastroesophageal reflux disease)     Sjogren's syndrome     Medication monitoring encounter     Tobacco abuse     Vitamin D deficiency     Obesity (BMI 30.0-34.9)     Osteoarthritis of left hip  Here for routine annual physicals.  Patient has been taking his medications regularly and reports that he continues to smoke half pack of cigarettes daily, requesting refill on blood pressure medication today.  Denies any chest pain or difficulty breathing with palpitations.  Has been followed by Rheumatology taking hydroxychloroquine and prednisone as needed for flare-ups, has been taking meloxicam for chronic low back pain and hip pain    Review of Systems   Constitutional:  Negative for appetite change and fatigue.   Eyes:  Negative for visual disturbance.   Respiratory:  Negative for shortness of breath.    Cardiovascular:  Negative for chest pain, palpitations and leg swelling.   Gastrointestinal:  Negative for abdominal pain, nausea and vomiting.   Musculoskeletal:  Positive for arthralgias and back pain. Negative for myalgias.   Skin:  Negative for rash.   Neurological:  Negative for headaches.   Psychiatric/Behavioral:  Negative for sleep disturbance.        BP (!) 140/78 (BP Location: Right arm, Patient Position: Sitting, BP Method: Large (Manual))   Pulse 93   Temp 97.8 °F (36.6 °C) (Tympanic)   Resp 20   Ht 5' 11" (1.803 m)   Wt 111.7 kg (246 lb 4.1 oz)   SpO2 99%   BMI 34.35 kg/m²   Objective:      Physical Exam  Constitutional:       Appearance: He is well-developed.   HENT:      Head: Normocephalic and atraumatic.   Cardiovascular:      Rate and Rhythm: Normal rate and regular rhythm.      " Heart sounds: Normal heart sounds. No murmur heard.  Pulmonary:      Effort: Pulmonary effort is normal.      Breath sounds: Normal breath sounds. No wheezing.   Abdominal:      General: Bowel sounds are normal.      Palpations: Abdomen is soft.      Tenderness: There is no abdominal tenderness.   Musculoskeletal:         General: No tenderness.   Skin:     General: Skin is warm and dry.      Findings: No rash.   Neurological:      Mental Status: He is alert and oriented to person, place, and time.   Psychiatric:         Mood and Affect: Mood normal.       No visits with results within 2 Week(s) from this visit.   Latest known visit with results is:   Lab Visit on 10/01/2022   Component Date Value Ref Range Status    WBC 10/01/2022 5.47  3.90 - 12.70 K/uL Final    RBC 10/01/2022 4.85  4.60 - 6.20 M/uL Final    Hemoglobin 10/01/2022 14.9  14.0 - 18.0 g/dL Final    Hematocrit 10/01/2022 43.4  40.0 - 54.0 % Final    MCV 10/01/2022 90  82 - 98 fL Final    MCH 10/01/2022 30.7  27.0 - 31.0 pg Final    MCHC 10/01/2022 34.3  32.0 - 36.0 g/dL Final    RDW 10/01/2022 13.4  11.5 - 14.5 % Final    Platelets 10/01/2022 269  150 - 450 K/uL Final    MPV 10/01/2022 9.9  9.2 - 12.9 fL Final    Immature Granulocytes 10/01/2022 0.2  0.0 - 0.5 % Final    Gran # (ANC) 10/01/2022 2.0  1.8 - 7.7 K/uL Final    Immature Grans (Abs) 10/01/2022 0.01  0.00 - 0.04 K/uL Final    Comment: Mild elevation in immature granulocytes is non specific and   can be seen in a variety of conditions including stress response,   acute inflammation, trauma and pregnancy. Correlation with other   laboratory and clinical findings is essential.      Lymph # 10/01/2022 2.3  1.0 - 4.8 K/uL Final    Mono # 10/01/2022 0.8  0.3 - 1.0 K/uL Final    Eos # 10/01/2022 0.3  0.0 - 0.5 K/uL Final    Baso # 10/01/2022 0.06  0.00 - 0.20 K/uL Final    nRBC 10/01/2022 0  0 /100 WBC Final    Gran % 10/01/2022 36.9 (L)  38.0 - 73.0 % Final    Lymph % 10/01/2022 42.8  18.0 - 48.0 %  Final    Mono % 10/01/2022 13.9  4.0 - 15.0 % Final    Eosinophil % 10/01/2022 5.1  0.0 - 8.0 % Final    Basophil % 10/01/2022 1.1  0.0 - 1.9 % Final    Differential Method 10/01/2022 Automated   Final    Sodium 10/01/2022 139  136 - 145 mmol/L Final    Potassium 10/01/2022 4.2  3.5 - 5.1 mmol/L Final    Chloride 10/01/2022 106  95 - 110 mmol/L Final    CO2 10/01/2022 21 (L)  23 - 29 mmol/L Final    Glucose 10/01/2022 86  70 - 110 mg/dL Final    BUN 10/01/2022 13  6 - 20 mg/dL Final    Creatinine 10/01/2022 1.1  0.5 - 1.4 mg/dL Final    Calcium 10/01/2022 9.5  8.7 - 10.5 mg/dL Final    Total Protein 10/01/2022 7.0  6.0 - 8.4 g/dL Final    Albumin 10/01/2022 4.3  3.5 - 5.2 g/dL Final    Total Bilirubin 10/01/2022 0.3  0.1 - 1.0 mg/dL Final    Comment: For infants and newborns, interpretation of results should be based  on gestational age, weight and in agreement with clinical  observations.    Premature Infant recommended reference ranges:  Up to 24 hours.............<8.0 mg/dL  Up to 48 hours............<12.0 mg/dL  3-5 days..................<15.0 mg/dL  6-29 days.................<15.0 mg/dL      Alkaline Phosphatase 10/01/2022 60  55 - 135 U/L Final    AST 10/01/2022 28  10 - 40 U/L Final    ALT 10/01/2022 24  10 - 44 U/L Final    Anion Gap 10/01/2022 12  8 - 16 mmol/L Final    eGFR 10/01/2022 >60  >60 mL/min/1.73 m^2 Final    CRP 10/01/2022 2.5  0.0 - 8.2 mg/L Final    Sed Rate 10/01/2022 3  0 - 23 mm/Hr Final       Assessment/Plan:   1. Routine general medical examination at a health care facility  - Lipid Panel; Future  - TSH; Future  - Urinalysis, Reflex to Urine Culture Urine, Clean Catch; Future  - PSA, Screening; Future  Vital signs stable today.  Clinical exam stable  Continue lifestyle modifications with low-fat and low-cholesterol diet and exercise 30 minutes daily  Reviewed recent labs which looks stable    2. Essential hypertension  - amLODIPine (NORVASC) 10 MG tablet; Take 1 tablet (10 mg total) by  mouth once daily.  Dispense: 90 tablet; Refill: 1  - Lipid Panel; Future  - TSH; Future  - Urinalysis, Reflex to Urine Culture Urine, Clean Catch; Future  Blood pressure is stable currently on amlodipine 10 mg daily    3. Mixed hyperlipidemia  - Lipid Panel; Future  Currently taking fenofibrate 145 mg daily    4. Sjogren's syndrome with keratoconjunctivitis sicca  5. Undifferentiated connective tissue disease  Stable on hydroxychloroquine 200 mg twice daily and meloxicam as needed and prednisone for flare-ups    6. Gastroesophageal reflux disease, unspecified whether esophagitis present  Stable with diet changes    7. Tobacco abuse  Encouraged to quit smoking    8. Vitamin D deficiency  - Vitamin D; Future  Will check vitamin-D levels as it was low in the past    9. Primary osteoarthritis of left hip  Graded exercise regimen recommended    10. Obesity (BMI 30.0-34.9)   Lifestyle modifications discussed to lose weight with BMI 34    Refuses flu shot and other vaccinations

## 2022-10-31 NOTE — PATIENT INSTRUCTIONS
Thank you for your interest in the [facility] Multi Cancer Early Detection test. We are very excited about this clinical trial, the future of early cancer detection, and the overwhelming interest we have received. We have not yet started enrollment for this trial, but are ramping up to do so within the coming month.     Please email [email or web address] for more information and to be added to the list of interested patients.     [facility] is offering this same test for a fee. Please email [email or web address] if you are interested in purchasing.     Thank you for your interest as [facility] strives to improve the health outcomes of our community.

## 2023-01-17 ENCOUNTER — OFFICE VISIT (OUTPATIENT)
Dept: INTERNAL MEDICINE | Facility: CLINIC | Age: 48
End: 2023-01-17
Payer: COMMERCIAL

## 2023-01-17 VITALS
DIASTOLIC BLOOD PRESSURE: 88 MMHG | WEIGHT: 244.69 LBS | BODY MASS INDEX: 34.26 KG/M2 | RESPIRATION RATE: 20 BRPM | HEIGHT: 71 IN | TEMPERATURE: 98 F | OXYGEN SATURATION: 99 % | HEART RATE: 86 BPM | SYSTOLIC BLOOD PRESSURE: 139 MMHG

## 2023-01-17 DIAGNOSIS — J32.9 SINOBRONCHITIS: ICD-10-CM

## 2023-01-17 DIAGNOSIS — J40 SINOBRONCHITIS: ICD-10-CM

## 2023-01-17 DIAGNOSIS — E66.9 OBESITY (BMI 30.0-34.9): ICD-10-CM

## 2023-01-17 DIAGNOSIS — R21 RASH: ICD-10-CM

## 2023-01-17 DIAGNOSIS — M35.9 UNDIFFERENTIATED CONNECTIVE TISSUE DISEASE: ICD-10-CM

## 2023-01-17 DIAGNOSIS — J02.9 SORETHROAT: Primary | ICD-10-CM

## 2023-01-17 DIAGNOSIS — I10 ESSENTIAL HYPERTENSION: ICD-10-CM

## 2023-01-17 LAB
CTP QC/QA: YES
S PYO RRNA THROAT QL PROBE: NEGATIVE

## 2023-01-17 PROCEDURE — 3008F PR BODY MASS INDEX (BMI) DOCUMENTED: ICD-10-PCS | Mod: CPTII,S$GLB,, | Performed by: FAMILY MEDICINE

## 2023-01-17 PROCEDURE — 99214 PR OFFICE/OUTPT VISIT, EST, LEVL IV, 30-39 MIN: ICD-10-PCS | Mod: S$GLB,,, | Performed by: FAMILY MEDICINE

## 2023-01-17 PROCEDURE — 99999 PR PBB SHADOW E&M-EST. PATIENT-LVL IV: CPT | Mod: PBBFAC,,, | Performed by: FAMILY MEDICINE

## 2023-01-17 PROCEDURE — 3075F PR MOST RECENT SYSTOLIC BLOOD PRESS GE 130-139MM HG: ICD-10-PCS | Mod: CPTII,S$GLB,, | Performed by: FAMILY MEDICINE

## 2023-01-17 PROCEDURE — 3079F PR MOST RECENT DIASTOLIC BLOOD PRESSURE 80-89 MM HG: ICD-10-PCS | Mod: CPTII,S$GLB,, | Performed by: FAMILY MEDICINE

## 2023-01-17 PROCEDURE — 1159F PR MEDICATION LIST DOCUMENTED IN MEDICAL RECORD: ICD-10-PCS | Mod: CPTII,S$GLB,, | Performed by: FAMILY MEDICINE

## 2023-01-17 PROCEDURE — 99999 PR PBB SHADOW E&M-EST. PATIENT-LVL IV: ICD-10-PCS | Mod: PBBFAC,,, | Performed by: FAMILY MEDICINE

## 2023-01-17 PROCEDURE — 3008F BODY MASS INDEX DOCD: CPT | Mod: CPTII,S$GLB,, | Performed by: FAMILY MEDICINE

## 2023-01-17 PROCEDURE — 3079F DIAST BP 80-89 MM HG: CPT | Mod: CPTII,S$GLB,, | Performed by: FAMILY MEDICINE

## 2023-01-17 PROCEDURE — 3075F SYST BP GE 130 - 139MM HG: CPT | Mod: CPTII,S$GLB,, | Performed by: FAMILY MEDICINE

## 2023-01-17 PROCEDURE — 99214 OFFICE O/P EST MOD 30 MIN: CPT | Mod: S$GLB,,, | Performed by: FAMILY MEDICINE

## 2023-01-17 PROCEDURE — 87880 STREP A ASSAY W/OPTIC: CPT | Mod: QW,S$GLB,, | Performed by: FAMILY MEDICINE

## 2023-01-17 PROCEDURE — 87880 POCT RAPID STREP A: ICD-10-PCS | Mod: QW,S$GLB,, | Performed by: FAMILY MEDICINE

## 2023-01-17 PROCEDURE — 1160F PR REVIEW ALL MEDS BY PRESCRIBER/CLIN PHARMACIST DOCUMENTED: ICD-10-PCS | Mod: CPTII,S$GLB,, | Performed by: FAMILY MEDICINE

## 2023-01-17 PROCEDURE — 1159F MED LIST DOCD IN RCRD: CPT | Mod: CPTII,S$GLB,, | Performed by: FAMILY MEDICINE

## 2023-01-17 PROCEDURE — 1160F RVW MEDS BY RX/DR IN RCRD: CPT | Mod: CPTII,S$GLB,, | Performed by: FAMILY MEDICINE

## 2023-01-17 RX ORDER — PROMETHAZINE HYDROCHLORIDE AND DEXTROMETHORPHAN HYDROBROMIDE 6.25; 15 MG/5ML; MG/5ML
5 SYRUP ORAL NIGHTLY PRN
Qty: 118 ML | Refills: 0 | Status: SHIPPED | OUTPATIENT
Start: 2023-01-17 | End: 2023-01-27

## 2023-01-17 RX ORDER — METHYLPREDNISOLONE 4 MG/1
TABLET ORAL
Qty: 1 EACH | Refills: 0 | Status: SHIPPED | OUTPATIENT
Start: 2023-01-17 | End: 2023-03-09

## 2023-01-17 NOTE — PROGRESS NOTES
"Subjective:       Patient ID: Yoandy Forde is a 47 y.o. male.    Chief Complaint: Sinus Problem    47-year-old  male patient with Patient Active Problem List:     Dermatophytosis of other specified sites     Undifferentiated connective tissue disease     Essential hypertension     Hemorrhoids     GERD (gastroesophageal reflux disease)     Sjogren's syndrome     Medication monitoring encounter     Tobacco abuse     Vitamin D deficiency     Obesity (BMI 30.0-34.9)     Osteoarthritis of left hip  Reports patient started having sore throat coughing with congestion and sinus pressure for the past 1 week-has been taking over-the-counter Mucinex and Claritin with some relief  Reports that he is noticed rash to the right lower leg with minimal itching  Denies any chest pain or difficulty breathing with palpitations or wheezing, fever with chills had severe through throat with voice change for which patient started doing saltwater gargles and noticed rash to the top of the mouth    Review of Systems   Constitutional:  Negative for appetite change, chills, fatigue and fever.   HENT:  Positive for congestion, postnasal drip, rhinorrhea, sinus pressure, sore throat, trouble swallowing and voice change.    Eyes:  Negative for visual disturbance.   Respiratory:  Positive for cough. Negative for shortness of breath.    Cardiovascular:  Negative for chest pain, palpitations and leg swelling.   Gastrointestinal:  Negative for abdominal pain, nausea and vomiting.   Musculoskeletal:  Negative for myalgias.   Skin:  Positive for rash.   Neurological:  Positive for headaches.   Psychiatric/Behavioral:  Negative for sleep disturbance.        BP (!) 140/88 (BP Location: Right arm, Patient Position: Sitting, BP Method: Large (Manual))   Pulse 86   Temp 97.9 °F (36.6 °C) (Tympanic)   Resp 20   Ht 5' 11" (1.803 m)   Wt 111 kg (244 lb 11.4 oz)   SpO2 99%   BMI 34.13 kg/m²   Objective:      Physical Exam  Constitutional:  "      Appearance: He is well-developed.   HENT:      Head: Normocephalic and atraumatic.      Nose: Congestion present.      Mouth/Throat:      Mouth: Mucous membranes are moist.      Pharynx: Posterior oropharyngeal erythema present. No oropharyngeal exudate.   Cardiovascular:      Rate and Rhythm: Normal rate and regular rhythm.      Heart sounds: Normal heart sounds. No murmur heard.  Pulmonary:      Effort: Pulmonary effort is normal. No respiratory distress.      Breath sounds: Normal breath sounds. No wheezing.   Abdominal:      General: Bowel sounds are normal.      Palpations: Abdomen is soft.      Tenderness: There is no abdominal tenderness.   Lymphadenopathy:      Cervical: No cervical adenopathy.   Skin:     General: Skin is warm and dry.      Findings: Rash present.      Comments: Noticed tiny rash to the right lower leg anteriorly   Neurological:      Mental Status: He is alert and oriented to person, place, and time.   Psychiatric:         Mood and Affect: Mood normal.         Assessment/Plan:   1. Sorethroat  - POCT Rapid Strep A; Future  Patient tested negative for COVID at home  Will check for strep and will treat accordingly  If positive will consider antibiotics or else will consider Medrol Dosepak  Continue saltwater gargles  Rapid strep negative Will send Medrol Dosepak to the pharmacy which can help with sinobronchitis as well as rash    2. Sinobronchitis  Continue Mucinex and Claritin over-the-counter  Will send promethazine DM cough syrup to the pharmacy    3. Rash  Continue to apply Kenalog cream  If any worsening advised to discuss further with Rheumatology    4. Essential hypertension  Blood pressure is stable currently on amlodipine 10 mg daily    5. Undifferentiated connective tissue disease  Currently taking Plaquenil and meloxicam as needed    6. Obesity (BMI 30.0-34.9)   Lifestyle modifications discussed

## 2023-01-17 NOTE — LETTER
January 17, 2023      The [address]  [address] THE [facility]  [place] [address]  Phone: [phone]  Fax: [phone]       Patient: Yoandy Forde   YOB: 1975  Date of Visit: 01/17/2023    To Whom It May Concern:    Yomaira Forde  was at [facility] on 01/17/2023. The patient may return to work/school on 01/19/2023 with no restrictions. If you have any questions or concerns, or if I can be of further assistance, please do not hesitate to contact me.    Sincerely,    Mary Feliciano MD

## 2023-02-06 ENCOUNTER — TELEPHONE (OUTPATIENT)
Dept: RHEUMATOLOGY | Facility: CLINIC | Age: 48
End: 2023-02-06
Payer: COMMERCIAL

## 2023-02-06 DIAGNOSIS — L93.2 CUTANEOUS LUPUS ERYTHEMATOSUS: Primary | ICD-10-CM

## 2023-03-04 ENCOUNTER — LAB VISIT (OUTPATIENT)
Dept: LAB | Facility: HOSPITAL | Age: 48
End: 2023-03-04
Attending: PHYSICIAN ASSISTANT
Payer: COMMERCIAL

## 2023-03-04 DIAGNOSIS — L93.2 CUTANEOUS LUPUS ERYTHEMATOSUS: ICD-10-CM

## 2023-03-04 LAB
ALBUMIN SERPL BCP-MCNC: 4.3 G/DL (ref 3.5–5.2)
ALP SERPL-CCNC: 58 U/L (ref 55–135)
ALT SERPL W/O P-5'-P-CCNC: 23 U/L (ref 10–44)
ANION GAP SERPL CALC-SCNC: 11 MMOL/L (ref 8–16)
AST SERPL-CCNC: 26 U/L (ref 10–40)
BASOPHILS # BLD AUTO: 0.04 K/UL (ref 0–0.2)
BASOPHILS NFR BLD: 0.8 % (ref 0–1.9)
BILIRUB SERPL-MCNC: 0.6 MG/DL (ref 0.1–1)
BILIRUB UR QL STRIP: NEGATIVE
BUN SERPL-MCNC: 14 MG/DL (ref 6–20)
C3 SERPL-MCNC: 121 MG/DL (ref 50–180)
C4 SERPL-MCNC: 33 MG/DL (ref 11–44)
CALCIUM SERPL-MCNC: 9.3 MG/DL (ref 8.7–10.5)
CHLORIDE SERPL-SCNC: 106 MMOL/L (ref 95–110)
CLARITY UR: CLEAR
CO2 SERPL-SCNC: 22 MMOL/L (ref 23–29)
COLOR UR: YELLOW
CREAT SERPL-MCNC: 1.1 MG/DL (ref 0.5–1.4)
CREAT UR-MCNC: 171 MG/DL (ref 23–375)
CRP SERPL-MCNC: 3.8 MG/L (ref 0–8.2)
DIFFERENTIAL METHOD: NORMAL
EOSINOPHIL # BLD AUTO: 0.3 K/UL (ref 0–0.5)
EOSINOPHIL NFR BLD: 6.1 % (ref 0–8)
ERYTHROCYTE [DISTWIDTH] IN BLOOD BY AUTOMATED COUNT: 13.5 % (ref 11.5–14.5)
ERYTHROCYTE [SEDIMENTATION RATE] IN BLOOD BY PHOTOMETRIC METHOD: <2 MM/HR (ref 0–23)
EST. GFR  (NO RACE VARIABLE): >60 ML/MIN/1.73 M^2
GLUCOSE SERPL-MCNC: 104 MG/DL (ref 70–110)
GLUCOSE UR QL STRIP: NEGATIVE
HCT VFR BLD AUTO: 43.2 % (ref 40–54)
HGB BLD-MCNC: 14.9 G/DL (ref 14–18)
HGB UR QL STRIP: NEGATIVE
IMM GRANULOCYTES # BLD AUTO: 0.01 K/UL (ref 0–0.04)
IMM GRANULOCYTES NFR BLD AUTO: 0.2 % (ref 0–0.5)
KETONES UR QL STRIP: NEGATIVE
LEUKOCYTE ESTERASE UR QL STRIP: NEGATIVE
LYMPHOCYTES # BLD AUTO: 1.9 K/UL (ref 1–4.8)
LYMPHOCYTES NFR BLD: 35.4 % (ref 18–48)
MCH RBC QN AUTO: 30.9 PG (ref 27–31)
MCHC RBC AUTO-ENTMCNC: 34.5 G/DL (ref 32–36)
MCV RBC AUTO: 90 FL (ref 82–98)
MONOCYTES # BLD AUTO: 0.6 K/UL (ref 0.3–1)
MONOCYTES NFR BLD: 12.3 % (ref 4–15)
NEUTROPHILS # BLD AUTO: 2.4 K/UL (ref 1.8–7.7)
NEUTROPHILS NFR BLD: 45.2 % (ref 38–73)
NITRITE UR QL STRIP: NEGATIVE
NRBC BLD-RTO: 0 /100 WBC
PH UR STRIP: 7 [PH] (ref 5–8)
PLATELET # BLD AUTO: 216 K/UL (ref 150–450)
PMV BLD AUTO: 10.3 FL (ref 9.2–12.9)
POTASSIUM SERPL-SCNC: 4.1 MMOL/L (ref 3.5–5.1)
PROT SERPL-MCNC: 6.9 G/DL (ref 6–8.4)
PROT UR QL STRIP: NEGATIVE
PROT UR-MCNC: 10 MG/DL (ref 0–15)
PROT/CREAT UR: 0.06 MG/G{CREAT} (ref 0–0.2)
RBC # BLD AUTO: 4.82 M/UL (ref 4.6–6.2)
SODIUM SERPL-SCNC: 139 MMOL/L (ref 136–145)
SP GR UR STRIP: 1.02 (ref 1–1.03)
URN SPEC COLLECT METH UR: NORMAL
WBC # BLD AUTO: 5.22 K/UL (ref 3.9–12.7)

## 2023-03-04 PROCEDURE — 81003 URINALYSIS AUTO W/O SCOPE: CPT | Performed by: PHYSICIAN ASSISTANT

## 2023-03-04 PROCEDURE — 80053 COMPREHEN METABOLIC PANEL: CPT | Performed by: PHYSICIAN ASSISTANT

## 2023-03-04 PROCEDURE — 86160 COMPLEMENT ANTIGEN: CPT | Performed by: PHYSICIAN ASSISTANT

## 2023-03-04 PROCEDURE — 85652 RBC SED RATE AUTOMATED: CPT | Performed by: PHYSICIAN ASSISTANT

## 2023-03-04 PROCEDURE — 82570 ASSAY OF URINE CREATININE: CPT | Performed by: PHYSICIAN ASSISTANT

## 2023-03-04 PROCEDURE — 86160 COMPLEMENT ANTIGEN: CPT | Mod: 59 | Performed by: PHYSICIAN ASSISTANT

## 2023-03-04 PROCEDURE — 86225 DNA ANTIBODY NATIVE: CPT | Performed by: PHYSICIAN ASSISTANT

## 2023-03-04 PROCEDURE — 36415 COLL VENOUS BLD VENIPUNCTURE: CPT | Performed by: PHYSICIAN ASSISTANT

## 2023-03-04 PROCEDURE — 85025 COMPLETE CBC W/AUTO DIFF WBC: CPT | Performed by: PHYSICIAN ASSISTANT

## 2023-03-04 PROCEDURE — 86140 C-REACTIVE PROTEIN: CPT | Performed by: PHYSICIAN ASSISTANT

## 2023-03-06 LAB — DSDNA AB SER-ACNC: NORMAL [IU]/ML

## 2023-03-09 ENCOUNTER — PATIENT OUTREACH (OUTPATIENT)
Dept: ADMINISTRATIVE | Facility: HOSPITAL | Age: 48
End: 2023-03-09
Payer: COMMERCIAL

## 2023-03-09 ENCOUNTER — OFFICE VISIT (OUTPATIENT)
Dept: RHEUMATOLOGY | Facility: CLINIC | Age: 48
End: 2023-03-09
Payer: COMMERCIAL

## 2023-03-09 ENCOUNTER — PATIENT MESSAGE (OUTPATIENT)
Dept: DERMATOLOGY | Facility: CLINIC | Age: 48
End: 2023-03-09
Payer: COMMERCIAL

## 2023-03-09 VITALS
SYSTOLIC BLOOD PRESSURE: 143 MMHG | HEIGHT: 71 IN | DIASTOLIC BLOOD PRESSURE: 87 MMHG | HEART RATE: 93 BPM | BODY MASS INDEX: 34.45 KG/M2 | WEIGHT: 246.06 LBS

## 2023-03-09 DIAGNOSIS — L93.2 CUTANEOUS LUPUS ERYTHEMATOSUS: Primary | ICD-10-CM

## 2023-03-09 DIAGNOSIS — Z51.81 MEDICATION MONITORING ENCOUNTER: ICD-10-CM

## 2023-03-09 DIAGNOSIS — M35.01 SJOGREN'S SYNDROME WITH KERATOCONJUNCTIVITIS SICCA: ICD-10-CM

## 2023-03-09 PROCEDURE — 3077F PR MOST RECENT SYSTOLIC BLOOD PRESSURE >= 140 MM HG: ICD-10-PCS | Mod: CPTII,S$GLB,, | Performed by: PHYSICIAN ASSISTANT

## 2023-03-09 PROCEDURE — 99999 PR PBB SHADOW E&M-EST. PATIENT-LVL III: ICD-10-PCS | Mod: PBBFAC,,, | Performed by: PHYSICIAN ASSISTANT

## 2023-03-09 PROCEDURE — 99213 PR OFFICE/OUTPT VISIT, EST, LEVL III, 20-29 MIN: ICD-10-PCS | Mod: 25,S$GLB,, | Performed by: PHYSICIAN ASSISTANT

## 2023-03-09 PROCEDURE — 3008F PR BODY MASS INDEX (BMI) DOCUMENTED: ICD-10-PCS | Mod: CPTII,S$GLB,, | Performed by: PHYSICIAN ASSISTANT

## 2023-03-09 PROCEDURE — 1159F PR MEDICATION LIST DOCUMENTED IN MEDICAL RECORD: ICD-10-PCS | Mod: CPTII,S$GLB,, | Performed by: PHYSICIAN ASSISTANT

## 2023-03-09 PROCEDURE — 1159F MED LIST DOCD IN RCRD: CPT | Mod: CPTII,S$GLB,, | Performed by: PHYSICIAN ASSISTANT

## 2023-03-09 PROCEDURE — 96372 PR INJECTION,THERAP/PROPH/DIAG2ST, IM OR SUBCUT: ICD-10-PCS | Mod: S$GLB,,, | Performed by: PHYSICIAN ASSISTANT

## 2023-03-09 PROCEDURE — 99999 PR PBB SHADOW E&M-EST. PATIENT-LVL III: CPT | Mod: PBBFAC,,, | Performed by: PHYSICIAN ASSISTANT

## 2023-03-09 PROCEDURE — 3079F PR MOST RECENT DIASTOLIC BLOOD PRESSURE 80-89 MM HG: ICD-10-PCS | Mod: CPTII,S$GLB,, | Performed by: PHYSICIAN ASSISTANT

## 2023-03-09 PROCEDURE — 3077F SYST BP >= 140 MM HG: CPT | Mod: CPTII,S$GLB,, | Performed by: PHYSICIAN ASSISTANT

## 2023-03-09 PROCEDURE — 3008F BODY MASS INDEX DOCD: CPT | Mod: CPTII,S$GLB,, | Performed by: PHYSICIAN ASSISTANT

## 2023-03-09 PROCEDURE — 99213 OFFICE O/P EST LOW 20 MIN: CPT | Mod: 25,S$GLB,, | Performed by: PHYSICIAN ASSISTANT

## 2023-03-09 PROCEDURE — 3079F DIAST BP 80-89 MM HG: CPT | Mod: CPTII,S$GLB,, | Performed by: PHYSICIAN ASSISTANT

## 2023-03-09 PROCEDURE — 96372 THER/PROPH/DIAG INJ SC/IM: CPT | Mod: S$GLB,,, | Performed by: PHYSICIAN ASSISTANT

## 2023-03-09 RX ORDER — BETAMETHASONE SODIUM PHOSPHATE AND BETAMETHASONE ACETATE 3; 3 MG/ML; MG/ML
6 INJECTION, SUSPENSION INTRA-ARTICULAR; INTRALESIONAL; INTRAMUSCULAR; SOFT TISSUE
Status: COMPLETED | OUTPATIENT
Start: 2023-03-09 | End: 2023-03-09

## 2023-03-09 RX ADMIN — BETAMETHASONE SODIUM PHOSPHATE AND BETAMETHASONE ACETATE 6 MG: 3; 3 INJECTION, SUSPENSION INTRA-ARTICULAR; INTRALESIONAL; INTRAMUSCULAR; SOFT TISSUE at 09:03

## 2023-03-09 NOTE — PROGRESS NOTES
RHEUMATOLOGY OUTPATIENT CLINIC NOTE    3/9/2023    Attending Rheumatologist: Janet Bennett PA-C  Primary Care Provider: Mary Feliciano MD   Chief Complaint/Reason For Consultation:  Rash     Subjective:        Yoandy Forde is a 47 y.o. male here today for acute visit- complaint of having a skin rash on his right leg that he noticed a couple of months ago, patient states he is using a cream that he was previously prescribed that he says helps a little but sometimes it itches. Believes it is kenalog but not totally sure. Patient states it started off small and not sure if one is coming on his left leg. Still on HCQ. See picture below.  Rheumatologic systems otherwise negative. Physical exam shows lesion to the right anterior LE.    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 H/o +DONG and +SSA with bx suggestive of cutaneous lupus.  H/o dry eyes and mouth.  Uses systane drops. He takes hcq bid Most recent DONG have been negative. He has chronic mid back pain, used mobic and flexeril in the past. Seeing a chiropractor for left hip pain. He has occ knee and wrist pain, denies swelling of joints.     Last Eye Exam: 4/24/21    Serologies    Lab Results   Component Value Date    RF <10.0 09/07/2017     Lab Results   Component Value Date    HEPAIGM Negative 02/03/2009    HEPBIGM Negative 02/03/2009    HEPCAB Negative 02/03/2009        Current Rheum Medications:   mg BID, meloxicam  Previous Rheum Medications:   N/a  History of Spinal Disorders  C-Spine:  L-Spine:  Pain Management History:  Sees [facility]      Past Medical History:   Diagnosis Date    Hyperglycemia     Hypertension     Lupus erythematosus     MCTD (mixed connective tissue disease)     Dr Branham     Vitiligo      Social History     Tobacco Use    Smoking status: Some Days     Packs/day: 0.50     Years: 15.00     Pack years: 7.50     Types: Cigarettes     Start date: 4/14/1995    Smokeless tobacco: Never   Substance Use Topics     "Alcohol use: Yes     Alcohol/week: 0.0 standard drinks     Comment: occasional use       Review of patient's allergies indicates:   Allergen Reactions    Shellfish containing products     Insect venom     Sulfa (sulfonamide antibiotics) Hives       Objective:   BP (!) 143/87   Pulse 93   Ht 5' 11" (1.803 m)   Wt 111.6 kg (246 lb 0.5 oz)   BMI 34.31 kg/m²     Immunization History   Administered Date(s) Administered    COVID-19, MRNA, LN-S, PF (MODERNA FULL 0.5 ML DOSE) 03/31/2021, 04/30/2021, 12/20/2021    DTP 1975, [birth year], 11/12/1974    Influenza 02/05/2020    Influenza - Quadrivalent - PF *Preferred* (6 months and older) 10/23/2015, 09/20/2017, 01/14/2019    MMR 01/11/1974, 04/30/1974, 01/22/2003    OPV 1975, 1975, 1975, 11/02/1974    Td (ADULT) 03/20/1992, 01/22/2003    Tdap 08/23/2016     Current Outpatient Medications   Medication Instructions    amLODIPine (NORVASC) 10 mg, Oral, Daily    diclofenac sodium (PENNSAID) 20 mg/gram /actuation(2 %) sopm 2 Pump, Topical (Top), 2 times daily    fenofibrate (TRICOR) 145 MG tablet TAKE 1 TABLET(145 MG) BY MOUTH EVERY DAY    fluticasone propionate (FLONASE) 50 mcg/actuation nasal spray SHAKE LIQUID AND USE 2 SPRAYS(100 MCG) IN EACH NOSTRIL EVERY DAY    hydrOXYchloroQUINE (PLAQUENIL) 200 mg tablet TAKE 1 TABLET(200 MG) BY MOUTH TWICE DAILY    meloxicam (MOBIC) 15 mg, Oral, Daily    tadalafiL (CIALIS) 10 mg, Oral, Daily PRN    triamcinolone acetonide 0.1% (KENALOG) 0.1 % ointment AAA bid        Recent Results (from the past 336 hour(s))   Protein/Creatinine Ratio, Urine    Collection Time: 03/04/23 10:15 AM   Result Value Ref Range    Protein, Urine Random 10 0 - 15 mg/dL    Creatinine, Urine 171.0 23.0 - 375.0 mg/dL    Prot/Creat Ratio, Urine 0.06 0.00 - 0.20   Urinalysis    Collection Time: 03/04/23 10:15 AM   Result Value Ref Range    Specimen UA Urine, Clean Catch     Color, UA Yellow Yellow, Straw, Rhianna    Appearance, UA " Clear Clear    pH, UA 7.0 5.0 - 8.0    Specific Gravity, UA 1.020 1.005 - 1.030    Protein, UA Negative Negative    Glucose, UA Negative Negative    Ketones, UA Negative Negative    Bilirubin (UA) Negative Negative    Occult Blood UA Negative Negative    Nitrite, UA Negative Negative    Leukocytes, UA Negative Negative   Anti-DNA Ab, Double-Stranded    Collection Time: 03/04/23 10:17 AM   Result Value Ref Range    ds DNA Ab Negative 1:10 Negative 1:10   C3 Complement    Collection Time: 03/04/23 10:17 AM   Result Value Ref Range    Complement (C-3) 121 50 - 180 mg/dL   CBC Auto Differential    Collection Time: 03/04/23 10:17 AM   Result Value Ref Range    WBC 5.22 3.90 - 12.70 K/uL    RBC 4.82 4.60 - 6.20 M/uL    Hemoglobin 14.9 14.0 - 18.0 g/dL    Hematocrit 43.2 40.0 - 54.0 %    MCV 90 82 - 98 fL    MCH 30.9 27.0 - 31.0 pg    MCHC 34.5 32.0 - 36.0 g/dL    RDW 13.5 11.5 - 14.5 %    Platelets 216 150 - 450 K/uL    MPV 10.3 9.2 - 12.9 fL    Immature Granulocytes 0.2 0.0 - 0.5 %    Gran # (ANC) 2.4 1.8 - 7.7 K/uL    Immature Grans (Abs) 0.01 0.00 - 0.04 K/uL    Lymph # 1.9 1.0 - 4.8 K/uL    Mono # 0.6 0.3 - 1.0 K/uL    Eos # 0.3 0.0 - 0.5 K/uL    Baso # 0.04 0.00 - 0.20 K/uL    nRBC 0 0 /100 WBC    Gran % 45.2 38.0 - 73.0 %    Lymph % 35.4 18.0 - 48.0 %    Mono % 12.3 4.0 - 15.0 %    Eosinophil % 6.1 0.0 - 8.0 %    Basophil % 0.8 0.0 - 1.9 %    Differential Method Automated    C4 Complement    Collection Time: 03/04/23 10:17 AM   Result Value Ref Range    Complement (C-4) 33 11 - 44 mg/dL   Comprehensive Metabolic Panel    Collection Time: 03/04/23 10:17 AM   Result Value Ref Range    Sodium 139 136 - 145 mmol/L    Potassium 4.1 3.5 - 5.1 mmol/L    Chloride 106 95 - 110 mmol/L    CO2 22 (L) 23 - 29 mmol/L    Glucose 104 70 - 110 mg/dL    BUN 14 6 - 20 mg/dL    Creatinine 1.1 0.5 - 1.4 mg/dL    Calcium 9.3 8.7 - 10.5 mg/dL    Total Protein 6.9 6.0 - 8.4 g/dL    Albumin 4.3 3.5 - 5.2 g/dL    Total Bilirubin 0.6 0.1 -  1.0 mg/dL    Alkaline Phosphatase 58 55 - 135 U/L    AST 26 10 - 40 U/L    ALT 23 10 - 44 U/L    Anion Gap 11 8 - 16 mmol/L    eGFR >60 >60 mL/min/1.73 m^2   C-Reactive Protein    Collection Time: 03/04/23 10:17 AM   Result Value Ref Range    CRP 3.8 0.0 - 8.2 mg/L   Sedimentation rate    Collection Time: 03/04/23 10:17 AM   Result Value Ref Range    Sed Rate <2 0 - 23 mm/Hr           Imaging:  All imaging reviewed and independently interpreted by me.    Assessment:     1. Cutaneous lupus erythematosus    2. Sjogren's syndrome with keratoconjunctivitis sicca    3. Medication monitoring encounter            Plan:       Update eye exam ASAP; consider renewal of restasis for dry eye  cutaneous lupus w/ likely active lesion to the leg; cont  mg BID  IM celestone today, cont kenalog cream at home. Follow up with derm ASAP  Patient understands and contact clinic at any time regarding questions about today's office visit and any medication changes that were made  Return to clinic: 6 mos  Next Labs: before next OV- 1 week prior w/ arpit    The patient understands, chooses and consents to this plan and accepts all the risks which include but are not limited to the risks mentioned above.     Method of contact with patient concerns: [facility]    30 minutes of total time spent on the encounter, which includes face to face time and non-face to face time preparing to see the patient (eg, review of tests), Obtaining and/or reviewing separately obtained history, Documenting clinical information in the electronic or other health record, Independently interpreting results (not separately reported) and communicating results to the patient/family/caregiver, or Care coordination (not separately reported).          Disclaimer: This note was prepared using a voice recognition system and is likely to have sound alike errors within the text.       Janet Bennett PA-C  Rheumatology Department   [facility]  [place] [facility] [place]

## 2023-03-09 NOTE — NURSING
Administered 1 cc Betamethasone 6mg/cc  to left ventrogluteal. Pt tolerated well. No acute reaction noted to site. Pt instructed on S/S to report. Advised patient to wait in lobby 15 minutes after receiving injection to monitor for any reactions. Pt verbalized understanding.     Lot: [id number]  Exp: 03/23/2024              Side Effects:  appetite changes, glucose intolerance, insomnia, diaphoresis (sweating), elevated blood pressure, ecchymosis (bruising), rash, headache, injection site reaction/pain, anaphylaxis.

## 2023-03-16 ENCOUNTER — OFFICE VISIT (OUTPATIENT)
Dept: DERMATOLOGY | Facility: CLINIC | Age: 48
End: 2023-03-16
Payer: COMMERCIAL

## 2023-03-16 DIAGNOSIS — L30.0 NUMMULAR ECZEMA: Primary | ICD-10-CM

## 2023-03-16 PROCEDURE — 99999 PR PBB SHADOW E&M-EST. PATIENT-LVL III: ICD-10-PCS | Mod: PBBFAC,,, | Performed by: STUDENT IN AN ORGANIZED HEALTH CARE EDUCATION/TRAINING PROGRAM

## 2023-03-16 PROCEDURE — 1159F MED LIST DOCD IN RCRD: CPT | Mod: CPTII,S$GLB,, | Performed by: STUDENT IN AN ORGANIZED HEALTH CARE EDUCATION/TRAINING PROGRAM

## 2023-03-16 PROCEDURE — 1159F PR MEDICATION LIST DOCUMENTED IN MEDICAL RECORD: ICD-10-PCS | Mod: CPTII,S$GLB,, | Performed by: STUDENT IN AN ORGANIZED HEALTH CARE EDUCATION/TRAINING PROGRAM

## 2023-03-16 PROCEDURE — 99214 PR OFFICE/OUTPT VISIT, EST, LEVL IV, 30-39 MIN: ICD-10-PCS | Mod: S$GLB,,, | Performed by: STUDENT IN AN ORGANIZED HEALTH CARE EDUCATION/TRAINING PROGRAM

## 2023-03-16 PROCEDURE — 99214 OFFICE O/P EST MOD 30 MIN: CPT | Mod: S$GLB,,, | Performed by: STUDENT IN AN ORGANIZED HEALTH CARE EDUCATION/TRAINING PROGRAM

## 2023-03-16 PROCEDURE — 99999 PR PBB SHADOW E&M-EST. PATIENT-LVL III: CPT | Mod: PBBFAC,,, | Performed by: STUDENT IN AN ORGANIZED HEALTH CARE EDUCATION/TRAINING PROGRAM

## 2023-03-16 PROCEDURE — 1160F PR REVIEW ALL MEDS BY PRESCRIBER/CLIN PHARMACIST DOCUMENTED: ICD-10-PCS | Mod: CPTII,S$GLB,, | Performed by: STUDENT IN AN ORGANIZED HEALTH CARE EDUCATION/TRAINING PROGRAM

## 2023-03-16 PROCEDURE — 1160F RVW MEDS BY RX/DR IN RCRD: CPT | Mod: CPTII,S$GLB,, | Performed by: STUDENT IN AN ORGANIZED HEALTH CARE EDUCATION/TRAINING PROGRAM

## 2023-03-16 RX ORDER — CLOBETASOL PROPIONATE 0.5 MG/G
OINTMENT TOPICAL 2 TIMES DAILY
Qty: 60 G | Refills: 1 | Status: SHIPPED | OUTPATIENT
Start: 2023-03-16

## 2023-03-16 NOTE — PROGRESS NOTES
Subjective:       Patient ID:  Yoandy Forde is a 47 y.o. male who presents for   Chief Complaint   Patient presents with    Rash     History of Present Illness: The patient presents with chief complaint of a persistent rash/skin lesion.  Location: right lower leg/shin area  Duration: several weeks  Signs/Symptoms: circular, very itchy and crusted area on the leg  Prior treatments: was given systemic steroids and started using TAC cream, which seemed like it started to help a little with symptoms. No other lesions elsewhere.      Rash      Review of Systems   Constitutional:  Negative for fever and chills.   Skin:  Positive for rash.      Objective:    Physical Exam   Constitutional: He appears well-developed and well-nourished. No distress.   Neurological: He is alert and oriented to person, place, and time. He is not disoriented.   Psychiatric: He has a normal mood and affect.   Skin:   Areas Examined (abnormalities noted in diagram):   Head / Face Inspection Performed  Neck Inspection Performed  RUE Inspected  LUE Inspection Performed  RLE Inspected  LLE Inspection Performed            Diagram Legend     Erythematous scaling macule/papule c/w actinic keratosis       Vascular papule c/w angioma      Pigmented verrucoid papule/plaque c/w seborrheic keratosis      Yellow umbilicated papule c/w sebaceous hyperplasia      Irregularly shaped tan macule c/w lentigo     1-2 mm smooth white papules consistent with Milia      Movable subcutaneous cyst with punctum c/w epidermal inclusion cyst      Subcutaneous movable cyst c/w pilar cyst      Firm pink to brown papule c/w dermatofibroma      Pedunculated fleshy papule(s) c/w skin tag(s)      Evenly pigmented macule c/w junctional nevus     Mildly variegated pigmented, slightly irregular-bordered macule c/w mildly atypical nevus      Flesh colored to evenly pigmented papule c/w intradermal nevus       Pink pearly papule/plaque c/w basal cell carcinoma      Erythematous  hyperkeratotic cursted plaque c/w SCC      Surgical scar with no sign of skin cancer recurrence      Open and closed comedones      Inflammatory papules and pustules      Verrucoid papule consistent consistent with wart     Erythematous eczematous patches and plaques     Dystrophic onycholytic nail with subungual debris c/w onychomycosis     Umbilicated papule    Erythematous-base heme-crusted tan verrucoid plaque consistent with inflamed seborrheic keratosis     Erythematous Silvery Scaling Plaque c/w Psoriasis     See annotation      Assessment / Plan:        Nummular eczema  -     clobetasol 0.05% (TEMOVATE) 0.05 % Oint; Apply topically 2 (two) times daily.  Dispense: 60 g; Refill: 1             Follow up in about 3 months (around 6/16/2023).

## 2023-04-24 ENCOUNTER — OFFICE VISIT (OUTPATIENT)
Dept: INTERNAL MEDICINE | Facility: CLINIC | Age: 48
End: 2023-04-24
Payer: COMMERCIAL

## 2023-04-24 ENCOUNTER — LAB VISIT (OUTPATIENT)
Dept: LAB | Facility: HOSPITAL | Age: 48
End: 2023-04-24
Attending: FAMILY MEDICINE
Payer: COMMERCIAL

## 2023-04-24 VITALS
DIASTOLIC BLOOD PRESSURE: 82 MMHG | HEIGHT: 71 IN | OXYGEN SATURATION: 99 % | RESPIRATION RATE: 20 BRPM | WEIGHT: 245.81 LBS | SYSTOLIC BLOOD PRESSURE: 136 MMHG | BODY MASS INDEX: 34.41 KG/M2 | HEART RATE: 80 BPM

## 2023-04-24 DIAGNOSIS — Z12.5 PROSTATE CANCER SCREENING: ICD-10-CM

## 2023-04-24 DIAGNOSIS — E66.9 OBESITY (BMI 30.0-34.9): ICD-10-CM

## 2023-04-24 DIAGNOSIS — I10 ESSENTIAL HYPERTENSION: Primary | ICD-10-CM

## 2023-04-24 DIAGNOSIS — Z72.0 TOBACCO ABUSE: ICD-10-CM

## 2023-04-24 DIAGNOSIS — M35.01 SJOGREN'S SYNDROME WITH KERATOCONJUNCTIVITIS SICCA: ICD-10-CM

## 2023-04-24 DIAGNOSIS — I10 ESSENTIAL HYPERTENSION: ICD-10-CM

## 2023-04-24 DIAGNOSIS — E55.9 VITAMIN D DEFICIENCY: ICD-10-CM

## 2023-04-24 DIAGNOSIS — K21.9 GASTROESOPHAGEAL REFLUX DISEASE, UNSPECIFIED WHETHER ESOPHAGITIS PRESENT: ICD-10-CM

## 2023-04-24 DIAGNOSIS — M16.12 PRIMARY OSTEOARTHRITIS OF LEFT HIP: ICD-10-CM

## 2023-04-24 DIAGNOSIS — M35.9 UNDIFFERENTIATED CONNECTIVE TISSUE DISEASE: ICD-10-CM

## 2023-04-24 LAB
CHOLEST SERPL-MCNC: 190 MG/DL (ref 120–199)
CHOLEST/HDLC SERPL: 5 {RATIO} (ref 2–5)
ESTIMATED AVG GLUCOSE: 103 MG/DL (ref 68–131)
HBA1C MFR BLD: 5.2 % (ref 4–5.6)
HDLC SERPL-MCNC: 38 MG/DL (ref 40–75)
HDLC SERPL: 20 % (ref 20–50)
LDLC SERPL CALC-MCNC: 74 MG/DL (ref 63–159)
NONHDLC SERPL-MCNC: 152 MG/DL
TRIGL SERPL-MCNC: 390 MG/DL (ref 30–150)

## 2023-04-24 PROCEDURE — 3008F BODY MASS INDEX DOCD: CPT | Mod: CPTII,S$GLB,, | Performed by: FAMILY MEDICINE

## 2023-04-24 PROCEDURE — 36415 COLL VENOUS BLD VENIPUNCTURE: CPT | Performed by: FAMILY MEDICINE

## 2023-04-24 PROCEDURE — 3008F PR BODY MASS INDEX (BMI) DOCUMENTED: ICD-10-PCS | Mod: CPTII,S$GLB,, | Performed by: FAMILY MEDICINE

## 2023-04-24 PROCEDURE — 99999 PR PBB SHADOW E&M-EST. PATIENT-LVL IV: ICD-10-PCS | Mod: PBBFAC,,, | Performed by: FAMILY MEDICINE

## 2023-04-24 PROCEDURE — 99999 PR PBB SHADOW E&M-EST. PATIENT-LVL IV: CPT | Mod: PBBFAC,,, | Performed by: FAMILY MEDICINE

## 2023-04-24 PROCEDURE — 3075F PR MOST RECENT SYSTOLIC BLOOD PRESS GE 130-139MM HG: ICD-10-PCS | Mod: CPTII,S$GLB,, | Performed by: FAMILY MEDICINE

## 2023-04-24 PROCEDURE — 3075F SYST BP GE 130 - 139MM HG: CPT | Mod: CPTII,S$GLB,, | Performed by: FAMILY MEDICINE

## 2023-04-24 PROCEDURE — 3079F PR MOST RECENT DIASTOLIC BLOOD PRESSURE 80-89 MM HG: ICD-10-PCS | Mod: CPTII,S$GLB,, | Performed by: FAMILY MEDICINE

## 2023-04-24 PROCEDURE — 1159F PR MEDICATION LIST DOCUMENTED IN MEDICAL RECORD: ICD-10-PCS | Mod: CPTII,S$GLB,, | Performed by: FAMILY MEDICINE

## 2023-04-24 PROCEDURE — 1159F MED LIST DOCD IN RCRD: CPT | Mod: CPTII,S$GLB,, | Performed by: FAMILY MEDICINE

## 2023-04-24 PROCEDURE — 84153 ASSAY OF PSA TOTAL: CPT | Performed by: FAMILY MEDICINE

## 2023-04-24 PROCEDURE — 84443 ASSAY THYROID STIM HORMONE: CPT | Performed by: FAMILY MEDICINE

## 2023-04-24 PROCEDURE — 1160F PR REVIEW ALL MEDS BY PRESCRIBER/CLIN PHARMACIST DOCUMENTED: ICD-10-PCS | Mod: CPTII,S$GLB,, | Performed by: FAMILY MEDICINE

## 2023-04-24 PROCEDURE — 99214 PR OFFICE/OUTPT VISIT, EST, LEVL IV, 30-39 MIN: ICD-10-PCS | Mod: S$GLB,,, | Performed by: FAMILY MEDICINE

## 2023-04-24 PROCEDURE — 86677 HELICOBACTER PYLORI ANTIBODY: CPT | Performed by: FAMILY MEDICINE

## 2023-04-24 PROCEDURE — 83036 HEMOGLOBIN GLYCOSYLATED A1C: CPT | Performed by: FAMILY MEDICINE

## 2023-04-24 PROCEDURE — 80061 LIPID PANEL: CPT | Performed by: FAMILY MEDICINE

## 2023-04-24 PROCEDURE — 3079F DIAST BP 80-89 MM HG: CPT | Mod: CPTII,S$GLB,, | Performed by: FAMILY MEDICINE

## 2023-04-24 PROCEDURE — 1160F RVW MEDS BY RX/DR IN RCRD: CPT | Mod: CPTII,S$GLB,, | Performed by: FAMILY MEDICINE

## 2023-04-24 PROCEDURE — 99214 OFFICE O/P EST MOD 30 MIN: CPT | Mod: S$GLB,,, | Performed by: FAMILY MEDICINE

## 2023-04-24 RX ORDER — OMEPRAZOLE 40 MG/1
40 CAPSULE, DELAYED RELEASE ORAL EVERY MORNING
Qty: 30 CAPSULE | Refills: 3 | Status: SHIPPED | OUTPATIENT
Start: 2023-04-24 | End: 2024-04-23

## 2023-04-24 NOTE — LETTER
April 24, 2023      The [address]  [address] THE [facility]  [place] [address]  Phone: [phone]  Fax: [phone]       Patient: Yoandy Forde   YOB: 1975  Date of Visit: 04/24/2023    To Whom It May Concern:    Yomaira Forde  was at [facility] on 04/24/2023. The patient may return to work/school on 04/24/2023 with no restrictions. If you have any questions or concerns, or if I can be of further assistance, please do not hesitate to contact me.    Sincerely,    Mary Feliciano MD

## 2023-04-24 NOTE — PROGRESS NOTES
"Subjective:       Patient ID: Yoandy Forde is a 48 y.o. male.    Chief Complaint: Follow-up     48-year-old  male patient with Patient Active Problem List:     Dermatophytosis of other specified sites     Undifferentiated connective tissue disease     Essential hypertension     GERD (gastroesophageal reflux disease)     Sjogren's syndrome     Medication monitoring encounter     Tobacco abuse     Vitamin D deficiency     Obesity (BMI 30.0-34.9)     Osteoarthritis of left hip   Here for follow-up on chronic medical  conditions   Patient reports that he started having discomfort to the right chest with occasional belching and abdominal discomfort and nausea off and on lately and was told by pharmacist to consider taking acid reflux medication but patient did not start any over-the-counter and reports that he has cut down caffeine on empty stomach as recommended by pharmacist.  Patient has not been eating excessive spicy diet and denies any heartburn-like symptoms or chest pain     Review of Systems   Constitutional:  Negative for appetite change and fatigue.   Eyes:  Negative for visual disturbance.   Respiratory:  Positive for chest tightness. Negative for shortness of breath.    Cardiovascular:  Negative for chest pain, palpitations and leg swelling.   Gastrointestinal:  Positive for nausea. Negative for abdominal pain and vomiting.   Musculoskeletal:  Negative for myalgias.   Skin:  Negative for rash.   Neurological:  Negative for headaches.   Psychiatric/Behavioral:  Negative for sleep disturbance.        /82 (BP Location: Left arm, Patient Position: Sitting, BP Method: Large (Manual))   Pulse 80   Resp 20   Ht 5' 11" (1.803 m)   Wt 111.5 kg (245 lb 13 oz)   SpO2 99%   BMI 34.28 kg/m²   Objective:      Physical Exam  Constitutional:       Appearance: He is well-developed.   HENT:      Head: Normocephalic and atraumatic.   Cardiovascular:      Rate and Rhythm: Normal rate and regular " rhythm.      Heart sounds: Normal heart sounds. No murmur heard.  Pulmonary:      Effort: Pulmonary effort is normal.      Breath sounds: Normal breath sounds. No wheezing.   Chest:      Chest wall: No tenderness.   Abdominal:      General: Bowel sounds are normal.      Palpations: Abdomen is soft.      Tenderness: There is no abdominal tenderness.   Skin:     General: Skin is warm and dry.      Findings: No rash.   Neurological:      Mental Status: He is alert and oriented to person, place, and time.   Psychiatric:         Mood and Affect: Mood normal.         Assessment/Plan:   1. Essential hypertension  -     Lipid Panel; Future; Expected date: 04/24/2023  -     TSH; Future; Expected date: 04/24/2023  -     Hemoglobin A1C; Future; Expected date: 04/24/2023   Blood pressure is stable currently on amlodipine 10 mg    2. Gastroesophageal reflux disease, unspecified whether esophagitis present  -     omeprazole (PRILOSEC) 40 MG capsule; Take 1 capsule (40 mg total) by mouth every morning.  Dispense: 30 capsule; Refill: 3  -     H. PYLORI ANTIBODY, IGG; Future; Expected date: 04/24/2023   Will do a trial of Prilosec 40 mg and will check Helicobacter pylori levels patient was advised to hold off on meloxicam   Eat small frequent meals and avoid spicy diet    3. Primary osteoarthritis of left hip   Graded exercise regimen recommended    4. Undifferentiated connective tissue disease   Followed by Rheumatology currently on Plaquenil    5. Vitamin D deficiency   Currently taking over-the-counter vitamin-D supplements    6. Sjogren's syndrome with keratoconjunctivitis sicca  Overview:  On restasis   stable    7. Tobacco abuse   Encouraged to quit smoking    8. Prostate cancer screening  -     PSA, Screening; Future; Expected date: 04/24/2023    9. Obesity (BMI 30.0-34.9)   Lifestyle modifications discussed to lose weight with BMI 34

## 2023-04-25 LAB
COMPLEXED PSA SERPL-MCNC: 0.65 NG/ML (ref 0–4)
H PYLORI IGG SERPL QL IA: NEGATIVE
TSH SERPL DL<=0.005 MIU/L-ACNC: 2.42 UIU/ML (ref 0.4–4)

## 2023-06-25 DIAGNOSIS — I10 ESSENTIAL HYPERTENSION: ICD-10-CM

## 2023-06-25 NOTE — TELEPHONE ENCOUNTER
No care due was identified.  [facility] Embedded Care Due Messages. Reference number: [id number].   6/25/2023 10:17:40 AM CDT

## 2023-06-27 RX ORDER — AMLODIPINE BESYLATE 10 MG/1
TABLET ORAL
Qty: 90 TABLET | Refills: 3 | OUTPATIENT
Start: 2023-06-27

## 2023-06-27 NOTE — TELEPHONE ENCOUNTER
Quick DC. Request already responded to by other means (e.g. phone or fax)   Refill Authorization Note   Yoandy Forde  is requesting a refill authorization.  Brief Assessment and Rationale for Refill:  Quick Discontinue  Medication Therapy Plan:       Medication Reconciliation Completed:  No      Comments:     Note composed:11:32 AM 06/27/2023

## 2023-07-10 ENCOUNTER — HOSPITAL ENCOUNTER (EMERGENCY)
Facility: HOSPITAL | Age: 48
Discharge: HOME | End: 2023-07-10
Attending: EMERGENCY MEDICINE
Payer: COMMERCIAL

## 2023-07-10 ENCOUNTER — PATIENT MESSAGE (OUTPATIENT)
Dept: INTERNAL MEDICINE | Facility: CLINIC | Age: 48
End: 2023-07-10
Payer: COMMERCIAL

## 2023-07-10 VITALS
RESPIRATION RATE: 18 BRPM | DIASTOLIC BLOOD PRESSURE: 76 MMHG | HEIGHT: 71 IN | OXYGEN SATURATION: 100 % | WEIGHT: 246.81 LBS | BODY MASS INDEX: 34.55 KG/M2 | TEMPERATURE: 99 F | SYSTOLIC BLOOD PRESSURE: 138 MMHG | HEART RATE: 80 BPM

## 2023-07-10 DIAGNOSIS — R07.9 CHEST PAIN: ICD-10-CM

## 2023-07-10 DIAGNOSIS — R07.89 CHEST WALL PAIN: ICD-10-CM

## 2023-07-10 DIAGNOSIS — R07.9 CHEST PAIN, UNSPECIFIED TYPE: Primary | ICD-10-CM

## 2023-07-10 LAB
ALBUMIN SERPL BCP-MCNC: 4.5 G/DL (ref 3.5–5.2)
ALP SERPL-CCNC: 69 U/L (ref 55–135)
ALT SERPL W/O P-5'-P-CCNC: 20 U/L (ref 10–44)
ANION GAP SERPL CALC-SCNC: 10 MMOL/L (ref 8–16)
AST SERPL-CCNC: 23 U/L (ref 10–40)
BASOPHILS # BLD AUTO: 0.06 K/UL (ref 0–0.2)
BASOPHILS NFR BLD: 1.1 % (ref 0–1.9)
BILIRUB SERPL-MCNC: 0.2 MG/DL (ref 0.1–1)
BNP SERPL-MCNC: <10 PG/ML (ref 0–99)
BUN SERPL-MCNC: 17 MG/DL (ref 6–20)
CALCIUM SERPL-MCNC: 9.5 MG/DL (ref 8.7–10.5)
CHLORIDE SERPL-SCNC: 108 MMOL/L (ref 95–110)
CO2 SERPL-SCNC: 23 MMOL/L (ref 23–29)
CREAT SERPL-MCNC: 1.2 MG/DL (ref 0.5–1.4)
D DIMER PPP IA.FEU-MCNC: 0.58 MG/L FEU
DIFFERENTIAL METHOD: NORMAL
EOSINOPHIL # BLD AUTO: 0.2 K/UL (ref 0–0.5)
EOSINOPHIL NFR BLD: 3.9 % (ref 0–8)
ERYTHROCYTE [DISTWIDTH] IN BLOOD BY AUTOMATED COUNT: 13.2 % (ref 11.5–14.5)
EST. GFR  (NO RACE VARIABLE): >60 ML/MIN/1.73 M^2
GLUCOSE SERPL-MCNC: 90 MG/DL (ref 70–110)
HCT VFR BLD AUTO: 44.2 % (ref 40–54)
HCV AB SERPL QL IA: NEGATIVE
HEP C VIRUS HOLD SPECIMEN: NORMAL
HGB BLD-MCNC: 15.2 G/DL (ref 14–18)
HIV 1+2 AB+HIV1 P24 AG SERPL QL IA: NEGATIVE
IMM GRANULOCYTES # BLD AUTO: 0.02 K/UL (ref 0–0.04)
IMM GRANULOCYTES NFR BLD AUTO: 0.4 % (ref 0–0.5)
LYMPHOCYTES # BLD AUTO: 2.3 K/UL (ref 1–4.8)
LYMPHOCYTES NFR BLD: 40 % (ref 18–48)
MCH RBC QN AUTO: 30.7 PG (ref 27–31)
MCHC RBC AUTO-ENTMCNC: 34.4 G/DL (ref 32–36)
MCV RBC AUTO: 89 FL (ref 82–98)
MONOCYTES # BLD AUTO: 0.5 K/UL (ref 0.3–1)
MONOCYTES NFR BLD: 9.6 % (ref 4–15)
NEUTROPHILS # BLD AUTO: 2.6 K/UL (ref 1.8–7.7)
NEUTROPHILS NFR BLD: 45 % (ref 38–73)
NRBC BLD-RTO: 0 /100 WBC
PLATELET # BLD AUTO: 234 K/UL (ref 150–450)
PMV BLD AUTO: 10.1 FL (ref 9.2–12.9)
POTASSIUM SERPL-SCNC: 3.9 MMOL/L (ref 3.5–5.1)
PROT SERPL-MCNC: 7.4 G/DL (ref 6–8.4)
RBC # BLD AUTO: 4.95 M/UL (ref 4.6–6.2)
SODIUM SERPL-SCNC: 141 MMOL/L (ref 136–145)
TROPONIN I SERPL DL<=0.01 NG/ML-MCNC: 0.01 NG/ML (ref 0–0.03)
TROPONIN I SERPL DL<=0.01 NG/ML-MCNC: <0.006 NG/ML (ref 0–0.03)
WBC # BLD AUTO: 5.65 K/UL (ref 3.9–12.7)

## 2023-07-10 PROCEDURE — 93010 EKG 12-LEAD: ICD-10-PCS | Mod: ,,, | Performed by: INTERNAL MEDICINE

## 2023-07-10 PROCEDURE — 93005 ELECTROCARDIOGRAM TRACING: CPT

## 2023-07-10 PROCEDURE — 99285 EMERGENCY DEPT VISIT HI MDM: CPT | Mod: 25

## 2023-07-10 PROCEDURE — 87389 HIV-1 AG W/HIV-1&-2 AB AG IA: CPT | Performed by: EMERGENCY MEDICINE

## 2023-07-10 PROCEDURE — 93010 ELECTROCARDIOGRAM REPORT: CPT | Mod: ,,, | Performed by: INTERNAL MEDICINE

## 2023-07-10 PROCEDURE — 83880 ASSAY OF NATRIURETIC PEPTIDE: CPT | Performed by: NURSE PRACTITIONER

## 2023-07-10 PROCEDURE — 80053 COMPREHEN METABOLIC PANEL: CPT | Performed by: NURSE PRACTITIONER

## 2023-07-10 PROCEDURE — 85025 COMPLETE CBC W/AUTO DIFF WBC: CPT | Performed by: NURSE PRACTITIONER

## 2023-07-10 PROCEDURE — 85379 FIBRIN DEGRADATION QUANT: CPT | Performed by: EMERGENCY MEDICINE

## 2023-07-10 PROCEDURE — 86803 HEPATITIS C AB TEST: CPT | Performed by: EMERGENCY MEDICINE

## 2023-07-10 PROCEDURE — 25000003 PHARM REV CODE 250: Performed by: EMERGENCY MEDICINE

## 2023-07-10 PROCEDURE — 25500020 PHARM REV CODE 255: Performed by: EMERGENCY MEDICINE

## 2023-07-10 PROCEDURE — 84484 ASSAY OF TROPONIN QUANT: CPT | Mod: 91 | Performed by: NURSE PRACTITIONER

## 2023-07-10 RX ORDER — NAPROXEN 375 MG/1
375 TABLET ORAL 2 TIMES DAILY WITH MEALS
Qty: 60 TABLET | Refills: 0 | Status: SHIPPED | OUTPATIENT
Start: 2023-07-10

## 2023-07-10 RX ORDER — ASPIRIN 325 MG
325 TABLET ORAL
Status: COMPLETED | OUTPATIENT
Start: 2023-07-10 | End: 2023-07-10

## 2023-07-10 RX ADMIN — ASPIRIN 325 MG ORAL TABLET 325 MG: 325 PILL ORAL at 07:07

## 2023-07-10 RX ADMIN — IOHEXOL 100 ML: 350 INJECTION, SOLUTION INTRAVENOUS at 09:07

## 2023-07-10 NOTE — FIRST PROVIDER EVALUATION
Emergency Department TeleTriage Encounter Note      CHIEF COMPLAINT    Chief Complaint   Patient presents with    Chest Pain     Pt c/o of midsternal CP, worse with deep inspiration, since this morning. Denies N/V or SOb.        VITAL SIGNS   Initial Vitals [07/10/23 1608]   BP Pulse Resp Temp SpO2   (!) 146/82 86 16 98.2 °F (36.8 °C) 99 %      MAP       --            ALLERGIES    Review of patient's allergies indicates:   Allergen Reactions    Shellfish containing products     Insect venom     Sulfa (sulfonamide antibiotics) Hives       PROVIDER TRIAGE NOTE  Verbal consent for the teletriage evaluation was given by the patient at the start of the evaluation.  All efforts will be made to maintain patient's privacy during the evaluation.      This is a teletriage evaluation of a 48 y.o. male presenting to the ED with c/o chest pain that started at approximately 8am.  No additional symptoms.  Described as pressure to center of chest. Has some GasX with no relief.  Limited physical exam via telehealth: The patient is awake, alert, answering questions appropriately and is not in respiratory distress.  As the Teletriage provider, I performed an initial assessment and ordered appropriate labs and imaging studies, if any, to facilitate the patient's care once placed in the ED. Once a room is available, care and a full evaluation will be completed by an alternate ED provider.  Any additional orders and the final disposition will be determined by that provider.  All imaging and labs will not be followed-up by the Teletriage Team, including myself.          ORDERS  Labs Reviewed   HIV 1 / 2 ANTIBODY   HEPATITIS C ANTIBODY   HEP C VIRUS HOLD SPECIMEN       ED Orders (720h ago, onward)      Start Ordered     Status Ordering Provider    07/10/23 1717 07/10/23 1716  Vital signs  Every 15 min         Ordered VANDA KIM    07/10/23 1717 07/10/23 1716  Cardiac Monitoring - Adult  Continuous        Comments: Notify Physician If:     Ordered VANDA KIM    07/10/23 1717 07/10/23 1716  Pulse Oximetry Continuous  Continuous         Ordered VANDA KIM    07/10/23 1717 07/10/23 1716  Diet NPO  Diet effective now         Ordered VANDA KIM    07/10/23 1610 07/10/23 1609  EKG 12-lead  Once         In process LACY TENA P.    07/10/23 1609 07/10/23 1609  HIV 1/2 Ag/Ab (4th Gen)  STAT         Ordered HOUSE TENA P.    07/10/23 1609 07/10/23 1609  Hepatitis C Antibody  STAT        See Hyperspace for full Linked Orders Report.    Ordered HOUSE TENA P.    07/10/23 1609 07/10/23 1609  HCV Virus Hold Specimen  STAT        See Hyperspace for full Linked Orders Report.    Ordered LACY TENA P.    Unscheduled 07/10/23 1716  Saline lock IV  Once         Ordered VANDA KIM    Unscheduled 07/10/23 1716  EKG 12-lead  Once        Comments: Do not perform if previously done during this visit/ triage    Ordered VANDA KIM    Unscheduled 07/10/23 1716  CBC auto differential  STAT         Ordered VANDA KIM    Unscheduled 07/10/23 1716  Comprehensive metabolic panel  STAT         Ordered VANDA KIM    Unscheduled 07/10/23 1716  Troponin I #1  STAT         Ordered VANDA KIM    Unscheduled 07/10/23 1716  Troponin I #2  Once Timed         Ordered VANDA KIM    Unscheduled 07/10/23 1716  B-Type natriuretic peptide (BNP)  STAT         Ordered VANDA KIM    Unscheduled 07/10/23 1716  X-Ray Chest PA And Lateral  1 time imaging         Ordered VANDA KIM              Virtual Visit Note: The provider triage portion of this emergency department evaluation and documentation was performed via UserVoice, a HIPAA-compliant telemedicine application, in concert with a tele-presenter in the room. A face to face patient evaluation with one of my colleagues will occur once the patient is placed in an emergency department room.      DISCLAIMER: This note was prepared with Digital[employer] voice recognition transcription software. Marguerite  syntax, mangled pronouns, and other bizarre constructions may be attributed to that software system.

## 2023-07-10 NOTE — Clinical Note
"Yoandy Au" Maurisio was seen and treated in our emergency department on 7/10/2023.  He may return to work on 07/12/2023.       If you have any questions or concerns, please don't hesitate to call.      MARY VILLALOBOS    "

## 2023-07-10 NOTE — ED PROVIDER NOTES
SCRIBE #1 NOTE: I, Hayde Mejia, am scribing for, and in the presence of, Silvino Thompson Do, MD. I have scribed the entire note.       History     Chief Complaint   Patient presents with    Chest Pain     Pt c/o of midsternal CP, worse with deep inspiration, since this morning. Denies N/V or SOb.      Review of patient's allergies indicates:   Allergen Reactions    Shellfish containing products     Insect venom     Sulfa (sulfonamide antibiotics) Hives         History of Present Illness     HPI    7/10/2023, 6:15 PM  History obtained from the patient      History of Present Illness: Yoandy Forde is a 48 y.o. male patient with a PMHx of HTN who presents to the Emergency Department for evaluation of CP which onset this morning at 8AM with no radiation to arm neck back shoulder ear. Symptoms are constant and moderate in severity. No mitigating factors reported. Pain is a stabbing and throbbing pain and is exacerbated by deep breaths and movement.  Patient does live heavy objects at work sometimes but he does not remember lifting anything heavy the past few days.  Associated sxs include weakness and dizzy today which brought him in. Patient denies any nausea, vomiting, SOB, back pain, and all other sxs at this time. No Prior Tx reported. No further complaints or concerns at this time.       Arrival mode: Personal vehicle    PCP: Mary Feliciano MD        Past Medical History:  Past Medical History:   Diagnosis Date    Hyperglycemia     Hypertension     Lupus erythematosus     MCTD (mixed connective tissue disease)     Dr Branham     Vitiligo        Past Surgical History:  Past Surgical History:   Procedure Laterality Date    INJECTION OF ANESTHETIC AGENT AROUND NERVE Left 3/14/2022    Procedure: Left-sided femoral and obturator nerve block with RN IV sedation;  Surgeon: Silvia Buchanan MD;  Location: [facility];  Service: Pain Management;  Laterality: Left;    INJECTION OF ANESTHETIC AGENT INTO SACROILIAC JOINT Left  1/19/2022    Procedure: Left SIJ Injection  with RN IV sedation;  Surgeon: Silvia Buchanan MD;  Location: [facility];  Service: Pain Management;  Laterality: Left;    INJECTION OF JOINT Left 1/19/2022    Procedure: Left Hip injection by with RN IV sedation;  Surgeon: Silvia Buchanan MD;  Location: [facility];  Service: Pain Management;  Laterality: Left;    NASAL SEPTUM SURGERY      neck lipoma excision           Family History:  Family History   Problem Relation Age of Onset    Diabetes Mother     Hypertension Mother     Stroke Father     Hypertension Father     Asthma Brother     Heart disease Sister     Aneurysm Sister        Social History:  Social History     Tobacco Use    Smoking status: Some Days     Packs/day: 0.50     Years: 15.00     Pack years: 7.50     Types: Cigarettes     Start date: 4/14/1995    Smokeless tobacco: Never   Substance and Sexual Activity    Alcohol use: Yes     Alcohol/week: 0.0 standard drinks     Comment: occasional use    Drug use: Not on file    Sexual activity: Not on file        Review of Systems     Review of Systems   Constitutional:  Negative for chills and fever.   HENT:  Negative for sore throat.    Respiratory:  Negative for shortness of breath.    Cardiovascular:  Positive for chest pain.   Gastrointestinal:  Negative for nausea.   Genitourinary:  Negative for dysuria.   Musculoskeletal:  Negative for back pain.   Skin:  Negative for rash.   Neurological:  Positive for dizziness and weakness.   Hematological:  Does not bruise/bleed easily.   All other systems reviewed and are negative.     Physical Exam     Initial Vitals [07/10/23 1608]   BP Pulse Resp Temp SpO2   (!) 146/82 86 16 98.2 °F (36.8 °C) 99 %      MAP       --          Physical Exam  Nursing Notes and Vital Signs Reviewed.  Constitutional: Patient is in no acute distress. Well-developed and well-nourished.  Head: Atraumatic. Normocephalic.  Eyes: PERRL. EOM intact. Conjunctivae are not pale. No scleral  "icterus.  ENT: Mucous membranes are moist. Oropharynx is clear and symmetric.    Neck: Supple. Full ROM. No lymphadenopathy.  Cardiovascular: Regular rate. Regular rhythm. No murmurs, rubs, or gallops.   Pulmonary/Chest: No respiratory distress. Clear to auscultation bilaterally. No wheezing or rales.  Patient does have some tenderness to the left chest wall,  no rashes cellulitis or swelling noted  Abdominal: Soft and non-distended.  There is no tenderness.  No rebound, guarding, or rigidity. Good bowel sounds.  Genitourinary: No CVA tenderness  Musculoskeletal: Moves all extremities. No obvious deformities. No edema. No calf tenderness.  Skin: Warm and dry.  Neurological:  Alert, awake, and appropriate.  Normal speech.  No acute focal neurological deficits are appreciated.  Psychiatric: Normal affect. Good eye contact. Appropriate in content.     ED Course   Procedures  ED Vital Signs:  Vitals:    07/10/23 1608 07/10/23 2203   BP: (!) 146/82 138/76   Pulse: 86 80   Resp: 16 18   Temp: 98.2 °F (36.8 °C) 98.9 °F (37.2 °C)   TempSrc: Oral Oral   SpO2: 99% 100%   Weight: 112 kg (246 lb 12.9 oz)    Height: 5' 11" (1.803 m)        Abnormal Lab Results:  Labs Reviewed   D DIMER, QUANTITATIVE - Abnormal; Notable for the following components:       Result Value    D-Dimer 0.58 (*)     All other components within normal limits   HIV 1 / 2 ANTIBODY    Narrative:     Release to patient->Immediate   HEPATITIS C ANTIBODY    Narrative:     Release to patient->Immediate   HEP C VIRUS HOLD SPECIMEN    Narrative:     Release to patient->Immediate   CBC W/ AUTO DIFFERENTIAL   COMPREHENSIVE METABOLIC PANEL   TROPONIN I   TROPONIN I   B-TYPE NATRIURETIC PEPTIDE        All Lab Results:  Results for orders placed or performed during the hospital encounter of 07/10/23   HIV 1/2 Ag/Ab (4th Gen)   Result Value Ref Range    HIV 1/2 Ag/Ab Negative Negative   Hepatitis C Antibody   Result Value Ref Range    Hepatitis C Ab Negative Negative "   HCV Virus Hold Specimen   Result Value Ref Range    HEP C Virus Hold Specimen Hold for HCV sendout    CBC auto differential   Result Value Ref Range    WBC 5.65 3.90 - 12.70 K/uL    RBC 4.95 4.60 - 6.20 M/uL    Hemoglobin 15.2 14.0 - 18.0 g/dL    Hematocrit 44.2 40.0 - 54.0 %    MCV 89 82 - 98 fL    MCH 30.7 27.0 - 31.0 pg    MCHC 34.4 32.0 - 36.0 g/dL    RDW 13.2 11.5 - 14.5 %    Platelets 234 150 - 450 K/uL    MPV 10.1 9.2 - 12.9 fL    Immature Granulocytes 0.4 0.0 - 0.5 %    Gran # (ANC) 2.6 1.8 - 7.7 K/uL    Immature Grans (Abs) 0.02 0.00 - 0.04 K/uL    Lymph # 2.3 1.0 - 4.8 K/uL    Mono # 0.5 0.3 - 1.0 K/uL    Eos # 0.2 0.0 - 0.5 K/uL    Baso # 0.06 0.00 - 0.20 K/uL    nRBC 0 0 /100 WBC    Gran % 45.0 38.0 - 73.0 %    Lymph % 40.0 18.0 - 48.0 %    Mono % 9.6 4.0 - 15.0 %    Eosinophil % 3.9 0.0 - 8.0 %    Basophil % 1.1 0.0 - 1.9 %    Differential Method Automated    Comprehensive metabolic panel   Result Value Ref Range    Sodium 141 136 - 145 mmol/L    Potassium 3.9 3.5 - 5.1 mmol/L    Chloride 108 95 - 110 mmol/L    CO2 23 23 - 29 mmol/L    Glucose 90 70 - 110 mg/dL    BUN 17 6 - 20 mg/dL    Creatinine 1.2 0.5 - 1.4 mg/dL    Calcium 9.5 8.7 - 10.5 mg/dL    Total Protein 7.4 6.0 - 8.4 g/dL    Albumin 4.5 3.5 - 5.2 g/dL    Total Bilirubin 0.2 0.1 - 1.0 mg/dL    Alkaline Phosphatase 69 55 - 135 U/L    AST 23 10 - 40 U/L    ALT 20 10 - 44 U/L    eGFR >60 >60 mL/min/1.73 m^2    Anion Gap 10 8 - 16 mmol/L   Troponin I #1   Result Value Ref Range    Troponin I 0.011 0.000 - 0.026 ng/mL   Troponin I #2   Result Value Ref Range    Troponin I <0.006 0.000 - 0.026 ng/mL   B-Type natriuretic peptide (BNP)   Result Value Ref Range    BNP <10 0 - 99 pg/mL   D dimer, quantitative   Result Value Ref Range    D-Dimer 0.58 (H) <0.50 mg/L FEU         Imaging Results:  Imaging Results              CTA Chest Non-Coronary (PE Studies) (Final result)  Result time 07/10/23 21:38:33      Final result by Cira Guzman MD  (07/10/23 21:38:33)                   Impression:      Negative for PE      Electronically signed by: Cira Guzman  Date:    07/10/2023  Time:    21:38               Narrative:    EXAMINATION:  CTA CHEST NON CORONARY (PE STUDIES)    CLINICAL HISTORY:  Pulmonary embolism (PE) suspected, positive D-dimer;    TECHNIQUE:  Angiographic technique PE protocol with MIPS and post processing volumetric.  100 mL intravenous Omnipaque contrast    Iterative technique with low-dose parameters for diminishing radiation dose as reasonably achievable    COMPARISON:  Radiographic correlation    FINDINGS:  No pulmonary embolus seen    No pleural effusion    No pulmonary consolidation.                                       X-Ray Chest PA And Lateral (Final result)  Result time 07/10/23 18:06:49      Final result by Cira Guzman MD (07/10/23 18:06:49)                   Impression:      No active finding      Electronically signed by: Cira Guzman  Date:    07/10/2023  Time:    18:06               Narrative:    EXAMINATION:  XR CHEST PA AND LATERAL    CLINICAL HISTORY:  Chest Pain;    TECHNIQUE:  PA and lateral views of the chest were performed.    COMPARISON:  04/02/2020    FINDINGS:  No pulmonary consolidation or pleural effusion.  Mediastinal contour stable midline                                       The EKG was ordered, reviewed, and independently interpreted by the ED provider.  Interpretation time: 16:07  Rate: 83 BPM  Rhythm: normal sinus rhythm  Interpretation: Nonspecific T wave abnormality. No STEMI.           The Emergency Provider reviewed the vital signs and test results, which are outlined above.     ED Discussion       10:00 PM: Reassessed pt at this time. Discussed with pt all pertinent ED information and results. Discussed pt dx and plan of tx. Gave pt all f/u and return to the ED instructions. All questions and concerns were addressed at this time. Pt expresses understanding of information and  instructions, and is comfortable with plan to discharge. Pt is stable for discharge.    I discussed with patient and/or family/caretaker that evaluation in the ED does not suggest any emergent or life threatening medical conditions requiring immediate intervention beyond what was provided in the ED, and I believe patient is safe for discharge.  Regardless, an unremarkable evaluation in the ED does not preclude the development or presence of a serious of life threatening condition. As such, patient was instructed to return immediately for any worsening or change in current symptoms.         Medical Decision Making:   Differential Diagnosis:   Heart attack, angina, pneumonia, asthma, infection, aortic dissection, pneumothorax, pericarditis , pleural effusion, GERD, pancreatitis, gallstone pain, cholelithiasis or cholecystitis, esophageal rupture, bowel perforation, gastritis, nonspecific chest pain, musculoskeletal chest pain, costochondritis  Clinical Tests:   Lab Tests: Ordered and Reviewed  The following lab test(s) were unremarkable: CBC, CMP, BNP and Troponin  Radiological Study: Ordered and Reviewed  Medical Tests: Ordered and Reviewed  ED Management:  Labs within normal limits, chest x-ray did not show any acute pathology EKG with normal sinus rhythm with no ST elevation    Patient's D-dimer was mildly elevated in the ED,  CTA chest scan was done to rule out pulmonary emboli which did not show any intrathoracic pathology.  All of his lab work was normal including his  second troponin level was normal.   Patient does have a history of lupus so that might be why his D-dimer is slightly elevated.  He also has chest pain to palpation on exam.  He was given a prescription for naproxen and will also take some Tylenol at home.    Due to his risk factors, An ambulatory referral to cardiology was done. Patient was also advised to follow up with their primary care provider.         ED Medication(s):  Medications    aspirin tablet 325 mg (325 mg Oral Given 7/10/23 1933)   iohexoL (OMNIPAQUE 350) injection 100 mL (100 mLs Intravenous Given 7/10/23 2127)       Discharge Medication List as of 7/10/2023  9:47 PM        START taking these medications    Details   naproxen (NAPROSYN) 375 MG tablet Take 1 tablet (375 mg total) by mouth 2 (two) times daily with meals., Starting Mon 7/10/2023, Normal              Follow-up Information       Mary Feliciano MD In 2 days.    Specialty: Family Medicine  Contact information:  [id number]  [place] [address]  [phone]                                 Scribe Attestation:   Scribe #1: I performed the above scribed service and the documentation accurately describes the services I performed. I attest to the accuracy of the note.     Attending:   Physician Attestation Statement for Scribe #1: I, Silvino Thompson Do, MD, personally performed the services described in this documentation, as scribed by Hayde Mejia, in my presence, and it is both accurate and complete.           Clinical Impression       ICD-10-CM ICD-9-CM   1. Chest pain, unspecified type  R07.9 786.50   2. Chest pain  R07.9 786.50   3. Chest wall pain  R07.89 786.52       Disposition:   Disposition: Discharged  Condition: Stable       Silvino Thompson Do, MD  07/10/23 [id number]

## 2023-07-11 DIAGNOSIS — E78.2 MIXED HYPERLIPIDEMIA: Primary | ICD-10-CM

## 2023-07-11 RX ORDER — ROSUVASTATIN CALCIUM 10 MG/1
10 TABLET, COATED ORAL DAILY
Qty: 90 TABLET | Refills: 3 | Status: SHIPPED | OUTPATIENT
Start: 2023-07-11 | End: 2023-08-01 | Stop reason: SDUPTHER

## 2023-08-01 DIAGNOSIS — E78.2 MIXED HYPERLIPIDEMIA: ICD-10-CM

## 2023-08-01 NOTE — TELEPHONE ENCOUNTER
No care due was identified.  [facility] Embedded Care Due Messages. Reference number: [id number].   8/01/2023 6:26:20 PM CDT

## 2023-08-02 DIAGNOSIS — N52.9 ERECTILE DYSFUNCTION, UNSPECIFIED ERECTILE DYSFUNCTION TYPE: ICD-10-CM

## 2023-08-02 NOTE — TELEPHONE ENCOUNTER
No care due was identified.  [facility] Embedded Care Due Messages. Reference number: [id number].   8/02/2023 4:57:39 PM CDT

## 2023-08-03 RX ORDER — TADALAFIL 5 MG/1
TABLET ORAL
Qty: 30 TABLET | Refills: 0 | Status: SHIPPED | OUTPATIENT
Start: 2023-08-03

## 2023-08-03 RX ORDER — ROSUVASTATIN CALCIUM 10 MG/1
10 TABLET, COATED ORAL DAILY
Qty: 90 TABLET | Refills: 1 | Status: SHIPPED | OUTPATIENT
Start: 2023-08-03 | End: 2024-01-30

## 2023-08-10 ENCOUNTER — PATIENT MESSAGE (OUTPATIENT)
Dept: INTERNAL MEDICINE | Facility: CLINIC | Age: 48
End: 2023-08-10
Payer: COMMERCIAL

## 2023-08-14 DIAGNOSIS — L93.2 CUTANEOUS LUPUS ERYTHEMATOSUS: ICD-10-CM

## 2023-08-14 DIAGNOSIS — E78.2 MIXED HYPERLIPIDEMIA: ICD-10-CM

## 2023-08-14 RX ORDER — FENOFIBRATE 145 MG/1
145 TABLET, FILM COATED ORAL DAILY
Qty: 90 TABLET | Refills: 0 | Status: SHIPPED | OUTPATIENT
Start: 2023-08-14 | End: 2023-12-27

## 2023-08-14 RX ORDER — HYDROXYCHLOROQUINE SULFATE 200 MG/1
TABLET, FILM COATED ORAL
Qty: 60 TABLET | Refills: 3 | OUTPATIENT
Start: 2023-08-14

## 2023-08-14 NOTE — TELEPHONE ENCOUNTER
No care due was identified.  [facility] Embedded Care Due Messages. Reference number: [id number].   8/14/2023 10:41:40 AM CDT

## 2023-08-16 DIAGNOSIS — L93.2 CUTANEOUS LUPUS ERYTHEMATOSUS: ICD-10-CM

## 2023-08-17 DIAGNOSIS — J30.1 CHRONIC SEASONAL ALLERGIC RHINITIS DUE TO POLLEN: ICD-10-CM

## 2023-08-17 RX ORDER — HYDROXYCHLOROQUINE SULFATE 200 MG/1
200 TABLET, FILM COATED ORAL 2 TIMES DAILY
Qty: 60 TABLET | Refills: 3 | Status: SHIPPED | OUTPATIENT
Start: 2023-08-17 | End: 2024-01-22 | Stop reason: SDUPTHER

## 2023-08-18 NOTE — TELEPHONE ENCOUNTER
No care due was identified.  [facility] Embedded Care Due Messages. Reference number: [id number].   8/17/2023 8:45:56 PM CDT

## 2023-08-21 RX ORDER — FLUTICASONE PROPIONATE 50 MCG
1 SPRAY, SUSPENSION (ML) NASAL DAILY PRN
Qty: 16 G | Refills: 3 | Status: SHIPPED | OUTPATIENT
Start: 2023-08-21

## 2023-08-26 ENCOUNTER — PATIENT MESSAGE (OUTPATIENT)
Dept: ADMINISTRATIVE | Facility: HOSPITAL | Age: 48
End: 2023-08-26
Payer: COMMERCIAL

## 2023-08-28 ENCOUNTER — TELEPHONE (OUTPATIENT)
Dept: INTERNAL MEDICINE | Facility: CLINIC | Age: 48
End: 2023-08-28
Payer: COMMERCIAL

## 2023-08-28 ENCOUNTER — OFFICE VISIT (OUTPATIENT)
Dept: CARDIOLOGY | Facility: CLINIC | Age: 48
End: 2023-08-28
Payer: COMMERCIAL

## 2023-08-28 VITALS
HEIGHT: 71 IN | SYSTOLIC BLOOD PRESSURE: 128 MMHG | DIASTOLIC BLOOD PRESSURE: 82 MMHG | BODY MASS INDEX: 34.66 KG/M2 | HEART RATE: 94 BPM | OXYGEN SATURATION: 99 % | WEIGHT: 247.56 LBS

## 2023-08-28 DIAGNOSIS — R94.31 NONSPECIFIC ABNORMAL ELECTROCARDIOGRAM (ECG) (EKG): ICD-10-CM

## 2023-08-28 DIAGNOSIS — M35.9 UNDIFFERENTIATED CONNECTIVE TISSUE DISEASE: ICD-10-CM

## 2023-08-28 DIAGNOSIS — R07.9 CHEST PAIN, UNSPECIFIED TYPE: Primary | ICD-10-CM

## 2023-08-28 DIAGNOSIS — Z12.11 COLON CANCER SCREENING: Primary | ICD-10-CM

## 2023-08-28 DIAGNOSIS — K21.9 GASTROESOPHAGEAL REFLUX DISEASE, UNSPECIFIED WHETHER ESOPHAGITIS PRESENT: ICD-10-CM

## 2023-08-28 DIAGNOSIS — M35.01 SJOGREN'S SYNDROME WITH KERATOCONJUNCTIVITIS SICCA: ICD-10-CM

## 2023-08-28 DIAGNOSIS — I10 ESSENTIAL HYPERTENSION: ICD-10-CM

## 2023-08-28 DIAGNOSIS — Z72.0 TOBACCO ABUSE: ICD-10-CM

## 2023-08-28 DIAGNOSIS — E78.2 MIXED HYPERLIPIDEMIA: ICD-10-CM

## 2023-08-28 DIAGNOSIS — E66.9 OBESITY (BMI 30.0-34.9): ICD-10-CM

## 2023-08-28 PROCEDURE — 99204 OFFICE O/P NEW MOD 45 MIN: CPT | Mod: S$GLB,,, | Performed by: INTERNAL MEDICINE

## 2023-08-28 PROCEDURE — 3008F BODY MASS INDEX DOCD: CPT | Mod: CPTII,S$GLB,, | Performed by: INTERNAL MEDICINE

## 2023-08-28 PROCEDURE — 3079F PR MOST RECENT DIASTOLIC BLOOD PRESSURE 80-89 MM HG: ICD-10-PCS | Mod: CPTII,S$GLB,, | Performed by: INTERNAL MEDICINE

## 2023-08-28 PROCEDURE — 1159F PR MEDICATION LIST DOCUMENTED IN MEDICAL RECORD: ICD-10-PCS | Mod: CPTII,S$GLB,, | Performed by: INTERNAL MEDICINE

## 2023-08-28 PROCEDURE — 99204 PR OFFICE/OUTPT VISIT, NEW, LEVL IV, 45-59 MIN: ICD-10-PCS | Mod: S$GLB,,, | Performed by: INTERNAL MEDICINE

## 2023-08-28 PROCEDURE — 1160F RVW MEDS BY RX/DR IN RCRD: CPT | Mod: CPTII,S$GLB,, | Performed by: INTERNAL MEDICINE

## 2023-08-28 PROCEDURE — 3079F DIAST BP 80-89 MM HG: CPT | Mod: CPTII,S$GLB,, | Performed by: INTERNAL MEDICINE

## 2023-08-28 PROCEDURE — 1159F MED LIST DOCD IN RCRD: CPT | Mod: CPTII,S$GLB,, | Performed by: INTERNAL MEDICINE

## 2023-08-28 PROCEDURE — 99999 PR PBB SHADOW E&M-EST. PATIENT-LVL V: ICD-10-PCS | Mod: PBBFAC,,, | Performed by: INTERNAL MEDICINE

## 2023-08-28 PROCEDURE — 3008F PR BODY MASS INDEX (BMI) DOCUMENTED: ICD-10-PCS | Mod: CPTII,S$GLB,, | Performed by: INTERNAL MEDICINE

## 2023-08-28 PROCEDURE — 99999 PR PBB SHADOW E&M-EST. PATIENT-LVL V: CPT | Mod: PBBFAC,,, | Performed by: INTERNAL MEDICINE

## 2023-08-28 PROCEDURE — 3044F HG A1C LEVEL LT 7.0%: CPT | Mod: CPTII,S$GLB,, | Performed by: INTERNAL MEDICINE

## 2023-08-28 PROCEDURE — 3074F SYST BP LT 130 MM HG: CPT | Mod: CPTII,S$GLB,, | Performed by: INTERNAL MEDICINE

## 2023-08-28 PROCEDURE — 1160F PR REVIEW ALL MEDS BY PRESCRIBER/CLIN PHARMACIST DOCUMENTED: ICD-10-PCS | Mod: CPTII,S$GLB,, | Performed by: INTERNAL MEDICINE

## 2023-08-28 PROCEDURE — 3044F PR MOST RECENT HEMOGLOBIN A1C LEVEL <7.0%: ICD-10-PCS | Mod: CPTII,S$GLB,, | Performed by: INTERNAL MEDICINE

## 2023-08-28 PROCEDURE — 3074F PR MOST RECENT SYSTOLIC BLOOD PRESSURE < 130 MM HG: ICD-10-PCS | Mod: CPTII,S$GLB,, | Performed by: INTERNAL MEDICINE

## 2023-08-28 NOTE — PROGRESS NOTES
Subjective:   Patient ID:  Yoandy Forde is a 48 y.o. male who presents for cardiac consult of Hospital Follow Up (Due to chest pains and SOB.)      Referral by: Silvino Wei Md  [id number] [place] [address] [place] [address]     Reason for consult:       HPI  The patient came in today for cardiac consult of Hospital Follow Up (Due to chest pains and SOB.)      Yoandy Forde is a 48 y.o. male pt with HTN, HLD,  tobacco abuse, Obesity, Sjogrens, undiff CT disease, GERD presents for CV eval of CP.       7/10/2023, 6:15 PM  History obtained from the patient                  History of Present Illness: Yoandy Forde is a 48 y.o. male patient with a PMHx of HTN who presents to the Emergency Department for evaluation of CP which onset this morning at 8AM with no radiation to arm neck back shoulder ear. Symptoms are constant and moderate in severity. No mitigating factors reported. Pain is a stabbing and throbbing pain and is exacerbated by deep breaths and movement.  Patient does live heavy objects at work sometimes but he does not remember lifting anything heavy the past few days.  Associated sxs include weakness and dizzy today which brought him in. Patient denies any nausea, vomiting, SOB, back pain, and all other sxs at this time. No Prior Tx reported. No further complaints or concerns at this time.     8/28/23  Pt had recent ER eval last month for CP. He had neg trops, D dimer mildly elevated -  CTA neg for PE.   ECG - NSR , nonsp changes.     BP and HR well controlled. BMI 34 - 247 lbs. Pt's CP improved with NSAIDS.       Patient feels no leg swelling, no PND, no palpitation, no dizziness, no syncope, no CNS symptoms.    Patient is compliant with medications.      Normal sinus rhythm   Nonspecific T wave abnormality   Abnormal ECG   When compared with ECG of 29-JUN-2018 00:19,   Nonspecific T wave abnormality now evident in Lateral leads   Confirmed by Ja Maddox MD ([id number]) on 7/10/2023 8:53:13 PM      -  father-  of stroke in 70s, mother - cardiomegaly,     No results found for this or any previous visit.      No results found for this or any previous visit.      No results found for this or any previous visit.      No cardiac monitor results found for the past 12 months         Past Medical History:   Diagnosis Date    Hyperglycemia     Hypertension     Lupus erythematosus     MCTD (mixed connective tissue disease)     Dr Branham     Vitiligo        Past Surgical History:   Procedure Laterality Date    INJECTION OF ANESTHETIC AGENT AROUND NERVE Left 3/14/2022    Procedure: Left-sided femoral and obturator nerve block with RN IV sedation;  Surgeon: Silvia Buchanan MD;  Location: [facility];  Service: Pain Management;  Laterality: Left;    INJECTION OF ANESTHETIC AGENT INTO SACROILIAC JOINT Left 2022    Procedure: Left SIJ Injection  with RN IV sedation;  Surgeon: Silvia Buchanan MD;  Location: [facility];  Service: Pain Management;  Laterality: Left;    INJECTION OF JOINT Left 2022    Procedure: Left Hip injection by with RN IV sedation;  Surgeon: Silvia Buchanan MD;  Location: [facility];  Service: Pain Management;  Laterality: Left;    NASAL SEPTUM SURGERY      neck lipoma excision         Social History     Tobacco Use    Smoking status: Some Days     Current packs/day: 0.50     Average packs/day: 0.5 packs/day for 28.4 years (14.2 ttl pk-yrs)     Types: Cigarettes     Start date: 1995    Smokeless tobacco: Never   Substance Use Topics    Alcohol use: Yes     Alcohol/week: 0.0 standard drinks of alcohol     Comment: occasional use    Drug use: Never       Family History   Problem Relation Age of Onset    Diabetes Mother     Hypertension Mother     Stroke Father     Hypertension Father     Asthma Brother     Heart disease Sister     Aneurysm Sister        Patient's Medications   New Prescriptions    No medications on file   Previous Medications    AMLODIPINE (NORVASC) 10 MG TABLET    TAKE 1  TABLET(10 MG) BY MOUTH EVERY DAY    CLOBETASOL 0.05% (TEMOVATE) 0.05 % OINT    Apply topically 2 (two) times daily.    DICLOFENAC SODIUM (PENNSAID) 20 MG/GRAM /ACTUATION(2 %) SOPM    Apply 2 Pump topically 2 (two) times daily.    FENOFIBRATE (TRICOR) 145 MG TABLET    Take 1 tablet (145 mg total) by mouth once daily.    FLUTICASONE PROPIONATE (FLONASE) 50 MCG/ACTUATION NASAL SPRAY    1 spray (50 mcg total) by Each Nostril route daily as needed for Rhinitis or Allergies.    HYDROXYCHLOROQUINE (PLAQUENIL) 200 MG TABLET    Take 1 tablet (200 mg total) by mouth 2 (two) times daily.    MELOXICAM (MOBIC) 15 MG TABLET    Take 1 tablet (15 mg total) by mouth once daily.    NAPROXEN (NAPROSYN) 375 MG TABLET    Take 1 tablet (375 mg total) by mouth 2 (two) times daily with meals.    OMEPRAZOLE (PRILOSEC) 40 MG CAPSULE    Take 1 capsule (40 mg total) by mouth every morning.    ROSUVASTATIN (CRESTOR) 10 MG TABLET    Take 1 tablet (10 mg total) by mouth once daily.    TADALAFIL (CIALIS) 5 MG TABLET    TAKE 2 TABLETS(10 MG) BY MOUTH DAILY AS NEEDED FOR ERECTILE DYSFUNCTION    TRIAMCINOLONE ACETONIDE 0.1% (KENALOG) 0.1 % OINTMENT    AAA bid   Modified Medications    No medications on file   Discontinued Medications    No medications on file       Review of Systems   Constitutional: Negative.    HENT: Negative.     Eyes: Negative.    Respiratory: Negative.     Cardiovascular:  Positive for chest pain.   Gastrointestinal: Negative.    Genitourinary: Negative.    Musculoskeletal: Negative.    Skin: Negative.    Neurological: Negative.    Endo/Heme/Allergies: Negative.    Psychiatric/Behavioral: Negative.     All 12 systems otherwise negative.      Wt Readings from Last 3 Encounters:   08/28/23 112.3 kg (247 lb 9.2 oz)   07/10/23 112 kg (246 lb 12.9 oz)   04/24/23 111.5 kg (245 lb 13 oz)     Temp Readings from Last 3 Encounters:   07/10/23 98.9 °F (37.2 °C) (Oral)   01/17/23 97.9 °F (36.6 °C) (Tympanic)   10/31/22 97.8 °F (36.6 °C)  "(Tympanic)     BP Readings from Last 3 Encounters:   08/28/23 128/82   07/10/23 138/76   04/24/23 136/82     Pulse Readings from Last 3 Encounters:   08/28/23 94   07/10/23 80   04/24/23 80       /82 (BP Location: Right arm, Patient Position: Sitting, BP Method: Large (Manual))   Pulse 94   Ht 5' 11" (1.803 m)   Wt 112.3 kg (247 lb 9.2 oz)   SpO2 99%   BMI 34.53 kg/m²     Objective:   Physical Exam  Vitals and nursing note reviewed.   Constitutional:       General: He is not in acute distress.     Appearance: He is well-developed. He is obese. He is not diaphoretic.   HENT:      Head: Normocephalic and atraumatic.      Nose: Nose normal.   Eyes:      General: No scleral icterus.     Conjunctiva/sclera: Conjunctivae normal.   Neck:      Thyroid: No thyromegaly.      Vascular: No JVD.   Cardiovascular:      Rate and Rhythm: Normal rate and regular rhythm.      Heart sounds: S1 normal and S2 normal. No murmur heard.     No friction rub. No gallop. No S3 or S4 sounds.   Pulmonary:      Effort: Pulmonary effort is normal. No respiratory distress.      Breath sounds: Normal breath sounds. No stridor. No wheezing or rales.   Chest:      Chest wall: No tenderness.   Abdominal:      General: Bowel sounds are normal. There is no distension.      Palpations: Abdomen is soft. There is no mass.      Tenderness: There is no abdominal tenderness. There is no rebound.   Genitourinary:     Comments: Deferred  Musculoskeletal:         General: No tenderness or deformity. Normal range of motion.      Cervical back: Normal range of motion and neck supple.   Lymphadenopathy:      Cervical: No cervical adenopathy.   Skin:     General: Skin is warm and dry.      Coloration: Skin is not pale.      Findings: No erythema or rash.   Neurological:      Mental Status: He is alert and oriented to person, place, and time.      Motor: No abnormal muscle tone.      Coordination: Coordination normal.   Psychiatric:         Behavior: " Behavior normal.         Thought Content: Thought content normal.         Judgment: Judgment normal.         Lab Results   Component Value Date     07/10/2023    K 3.9 07/10/2023     07/10/2023    CO2 23 07/10/2023    BUN 17 07/10/2023    CREATININE 1.2 07/10/2023    GLU 90 07/10/2023    HGBA1C 5.2 04/24/2023    AST 23 07/10/2023    ALT 20 07/10/2023    ALBUMIN 4.5 07/10/2023    PROT 7.4 07/10/2023    BILITOT 0.2 07/10/2023    WBC 5.65 07/10/2023    HGB 15.2 07/10/2023    HCT 44.2 07/10/2023    MCV 89 07/10/2023     07/10/2023    TSH 2.415 04/24/2023    CHOL 190 04/24/2023    HDL 38 (L) 04/24/2023    LDLCALC 74.0 04/24/2023    TRIG 390 (H) 04/24/2023    BNP <10 07/10/2023         BNP (pg/mL)   Date Value   07/10/2023 <10   06/29/2018 <10          Assessment:      1. Chest pain, unspecified type    2. Essential hypertension    3. Sjogren's syndrome with keratoconjunctivitis sicca    4. Undifferentiated connective tissue disease    5. Gastroesophageal reflux disease, unspecified whether esophagitis present    6. Tobacco abuse    7. Obesity (BMI 30.0-34.9)    8. Mixed hyperlipidemia    9. Nonspecific abnormal electrocardiogram (ECG) (EKG)        Plan:     Chest pain, with abnormal ECG, FH CVD   - recent r/o for ACS at ER  - order stress echo to r/o    2. HTN  - titrate meds    3. HLD  - cont statin and fibrate - had headaches with statin  - needs improvement     4. GERD  - cont PPI    5. Sjogrens and CT disease  - cont tx per PCP/rheum - on Plaquenil     6. Obesity BMI 34 - 247 lbs  - cont weight loss    7. Tobacco abuse  - needs to quit       Thank you for allowing me to participate in this patient's care. Please do not hesitate to contact me with any questions or concerns. Consult note has been forwarded to the referral physician.

## 2023-09-29 ENCOUNTER — HOSPITAL ENCOUNTER (OUTPATIENT)
Dept: CARDIOLOGY | Facility: HOSPITAL | Age: 48
Discharge: HOME | End: 2023-09-29
Attending: INTERNAL MEDICINE
Payer: COMMERCIAL

## 2023-09-29 VITALS
SYSTOLIC BLOOD PRESSURE: 142 MMHG | BODY MASS INDEX: 34.58 KG/M2 | DIASTOLIC BLOOD PRESSURE: 88 MMHG | WEIGHT: 247 LBS | HEIGHT: 71 IN

## 2023-09-29 DIAGNOSIS — M35.01 SJOGREN'S SYNDROME WITH KERATOCONJUNCTIVITIS SICCA: ICD-10-CM

## 2023-09-29 DIAGNOSIS — E66.9 OBESITY (BMI 30.0-34.9): ICD-10-CM

## 2023-09-29 DIAGNOSIS — I10 ESSENTIAL HYPERTENSION: ICD-10-CM

## 2023-09-29 DIAGNOSIS — R07.9 CHEST PAIN, UNSPECIFIED TYPE: ICD-10-CM

## 2023-09-29 DIAGNOSIS — K21.9 GASTROESOPHAGEAL REFLUX DISEASE, UNSPECIFIED WHETHER ESOPHAGITIS PRESENT: ICD-10-CM

## 2023-09-29 DIAGNOSIS — E78.2 MIXED HYPERLIPIDEMIA: ICD-10-CM

## 2023-09-29 DIAGNOSIS — Z72.0 TOBACCO ABUSE: ICD-10-CM

## 2023-09-29 DIAGNOSIS — R94.31 NONSPECIFIC ABNORMAL ELECTROCARDIOGRAM (ECG) (EKG): ICD-10-CM

## 2023-09-29 DIAGNOSIS — M35.9 UNDIFFERENTIATED CONNECTIVE TISSUE DISEASE: ICD-10-CM

## 2023-09-29 LAB
AORTIC ROOT ANNULUS: 2.99 CM
ASCENDING AORTA: 3.43 CM
AV INDEX (PROSTH): 0.7
AV MEAN GRADIENT: 3 MMHG
AV PEAK GRADIENT: 5 MMHG
AV VALVE AREA BY VELOCITY RATIO: 3.71 CM²
AV VALVE AREA: 3.44 CM²
AV VELOCITY RATIO: 0.75
BSA FOR ECHO PROCEDURE: 2.37 M2
CV ECHO LV RWT: 0.66 CM
CV STRESS BASE HR: 81 BPM
DIASTOLIC BLOOD PRESSURE: 88 MMHG
DOP CALC AO PEAK VEL: 1.16 M/S
DOP CALC AO VTI: 23.4 CM
DOP CALC LVOT AREA: 4.9 CM2
DOP CALC LVOT DIAMETER: 2.51 CM
DOP CALC LVOT PEAK VEL: 0.87 M/S
DOP CALC LVOT STROKE VOLUME: 80.61 CM3
DOP CALC RVOT PEAK VEL: 0.72 M/S
DOP CALC RVOT VTI: 14.3 CM
DOP CALCLVOT PEAK VEL VTI: 16.3 CM
E WAVE DECELERATION TIME: 173.39 MSEC
E/A RATIO: 0.75
E/E' RATIO: 9.67 M/S
ECHO LV POSTERIOR WALL: 1.56 CM (ref 0.6–1.1)
EJECTION FRACTION: 55 %
FRACTIONAL SHORTENING: 27 % (ref 28–44)
INTERVENTRICULAR SEPTUM: 1.24 CM (ref 0.6–1.1)
IVC DIAMETER: 0.89 CM
IVRT: 79.92 MSEC
LA MAJOR: 5.62 CM
LA MINOR: 5.97 CM
LA WIDTH: 3.6 CM
LEFT ATRIUM SIZE: 4.18 CM
LEFT ATRIUM VOLUME INDEX MOD: 22.7 ML/M2
LEFT ATRIUM VOLUME INDEX: 32.1 ML/M2
LEFT ATRIUM VOLUME MOD: 52.55 CM3
LEFT ATRIUM VOLUME: 74.06 CM3
LEFT INTERNAL DIMENSION IN SYSTOLE: 3.44 CM (ref 2.1–4)
LEFT VENTRICLE DIASTOLIC VOLUME INDEX: 44.42 ML/M2
LEFT VENTRICLE DIASTOLIC VOLUME: 102.6 ML
LEFT VENTRICLE MASS INDEX: 115 G/M2
LEFT VENTRICLE SYSTOLIC VOLUME INDEX: 21.1 ML/M2
LEFT VENTRICLE SYSTOLIC VOLUME: 48.64 ML
LEFT VENTRICULAR INTERNAL DIMENSION IN DIASTOLE: 4.7 CM (ref 3.5–6)
LEFT VENTRICULAR MASS: 265.22 G
LV LATERAL E/E' RATIO: 8.29 M/S
LV SEPTAL E/E' RATIO: 11.6 M/S
LVOT MG: 1.74 MMHG
LVOT MV: 0.63 CM/S
MV PEAK A VEL: 0.77 M/S
MV PEAK E VEL: 0.58 M/S
MV STENOSIS PRESSURE HALF TIME: 50.28 MS
MV VALVE AREA P 1/2 METHOD: 4.38 CM2
OHS CV CPX 1 MINUTE RECOVERY HEART RATE: 131 BPM
OHS CV CPX 85 PERCENT MAX PREDICTED HEART RATE MALE: 145
OHS CV CPX ESTIMATED METS: 9.5
OHS CV CPX MAX PREDICTED HEART RATE: 170
OHS CV CPX PATIENT IS FEMALE: 0
OHS CV CPX PATIENT IS MALE: 1
OHS CV CPX PEAK DIASTOLIC BLOOD PRESSURE: 54 MMHG
OHS CV CPX PEAK HEAR RATE: 157 BPM
OHS CV CPX PEAK RATE PRESSURE PRODUCT: ABNORMAL
OHS CV CPX PEAK SYSTOLIC BLOOD PRESSURE: 209 MMHG
OHS CV CPX PERCENT MAX PREDICTED HEART RATE ACHIEVED: 92
OHS CV CPX RATE PRESSURE PRODUCT PRESENTING: ABNORMAL
PISA TR MAX VEL: 1.44 M/S
PV MEAN GRADIENT: 1 MMHG
PV MV: 0.75 M/S
PV PEAK GRADIENT: 6 MMHG
PV PEAK VELOCITY: 1.2 M/S
RA MAJOR: 4.76 CM
RA PRESSURE ESTIMATED: 3 MMHG
RA WIDTH: 2.81 CM
RIGHT VENTRICULAR END-DIASTOLIC DIMENSION: 4.38 CM
RV TB RVSP: 4 MMHG
RV TISSUE DOPPLER FREE WALL SYSTOLIC VELOCITY 1 (APICAL 4 CHAMBER VIEW): 12.34 CM/S
SINUS: 2.89 CM
STJ: 2.91 CM
STRESS ECHO POST EXERCISE DUR MIN: 7 MINUTES
STRESS ECHO POST EXERCISE DUR SEC: 39 SECONDS
SYSTOLIC BLOOD PRESSURE: 142 MMHG
TASV: 12 CM/S
TDI LATERAL: 0.07 M/S
TDI SEPTAL: 0.05 M/S
TDI: 0.06 M/S
TR MAX PG: 8 MMHG
TRICUSPID ANNULAR PLANE SYSTOLIC EXCURSION: 1.75 CM
TV REST PULMONARY ARTERY PRESSURE: 11 MMHG
Z-SCORE OF LEFT VENTRICULAR DIMENSION IN END DIASTOLE: -6.59
Z-SCORE OF LEFT VENTRICULAR DIMENSION IN END SYSTOLE: -3.68

## 2023-09-29 PROCEDURE — 93351 STRESS ECHO (CUPID ONLY): ICD-10-PCS | Mod: 26,,, | Performed by: INTERNAL MEDICINE

## 2023-09-29 PROCEDURE — 93351 STRESS TTE COMPLETE: CPT

## 2023-09-29 PROCEDURE — 93351 STRESS TTE COMPLETE: CPT | Mod: 26,,, | Performed by: INTERNAL MEDICINE

## 2023-10-05 ENCOUNTER — HOSPITAL ENCOUNTER (OUTPATIENT)
Dept: PREADMISSION TESTING | Facility: HOSPITAL | Age: 48
Discharge: HOME | End: 2023-10-05
Attending: COLON & RECTAL SURGERY
Payer: COMMERCIAL

## 2023-10-05 ENCOUNTER — E-CONSULT (OUTPATIENT)
Dept: CARDIOLOGY | Facility: CLINIC | Age: 48
End: 2023-10-05
Payer: COMMERCIAL

## 2023-10-05 DIAGNOSIS — Z12.11 COLON CANCER SCREENING: Primary | ICD-10-CM

## 2023-10-05 DIAGNOSIS — Z01.810 PREOP CARDIOVASCULAR EXAM: ICD-10-CM

## 2023-10-05 PROCEDURE — 99451 PR INTERPROF, PHONE/INTERNET/EHR, CONSULT, >= 5MINS: ICD-10-PCS | Mod: S$GLB,,, | Performed by: INTERNAL MEDICINE

## 2023-10-05 PROCEDURE — 99451 NTRPROF PH1/NTRNET/EHR 5/>: CPT | Mod: S$GLB,,, | Performed by: INTERNAL MEDICINE

## 2023-10-05 RX ORDER — SODIUM, POTASSIUM,MAG SULFATES 17.5-3.13G
1 SOLUTION, RECONSTITUTED, ORAL ORAL DAILY
Qty: 1 KIT | Refills: 0 | Status: SHIPPED | OUTPATIENT
Start: 2023-10-05 | End: 2023-10-07

## 2023-10-05 NOTE — CONSULTS
[facility]  Response for E-Consult     Patient Name: Yoandy Forde  MRN: [id number]  Primary Care Provider: Mary Feliciano MD   Requesting Provider: Lizz Mckeon NP  E-Consult to Cardiology  Consult performed by: Rober Valentine MD  Consult ordered by: Lizz Mckeon NP              47 yo m,  E consult for preop clearance of colonoscopy  The chart reviewed.  PMH HTn HLD sjogren dz  09/29/23  stress echo showed EF nl. And no stress induced ischemia    Plan  Elevated periop risk of CV events for non-high risk procedure.  Ok to proceed the scheduled procedure without further cardiac study.        Total time of Consultation: 10 minute    I did not speak to the requesting provider verbally about this.     *This eConsult is based on the clinical data available to me and is furnished without benefit of a physical examination. The eConsult will need to be interpreted in light of any clinical issues or changes in patient status not available to me at the time of filing this eConsults. Significant changes in patient condition or level of acuity should result in immediate formal consultation and reevaluation. Please alert me if you have further questions.    Thank you for this eConsult referral.     Rober Valentine MD  The [address]

## 2023-10-12 ENCOUNTER — ANESTHESIA EVENT (OUTPATIENT)
Dept: ENDOSCOPY | Facility: HOSPITAL | Age: 48
End: 2023-10-12
Payer: COMMERCIAL

## 2023-10-12 NOTE — ANESTHESIA PREPROCEDURE EVALUATION
10/12/2023  Yoandy Forde is a 48 y.o., male.    Hx of CP on 9/28- cardiac clearance on chart from 10/5         Past Medical History:   Diagnosis Date    Hyperglycemia     Hypertension     Lupus erythematosus     MCTD (mixed connective tissue disease)     Dr Branham     Vitiligo      Past Surgical History:   Procedure Laterality Date    INJECTION OF ANESTHETIC AGENT AROUND NERVE Left 3/14/2022    Procedure: Left-sided femoral and obturator nerve block with RN IV sedation;  Surgeon: Silvia Buchanan MD;  Location: [facility] PAIN MGT;  Service: Pain Management;  Laterality: Left;    INJECTION OF ANESTHETIC AGENT INTO SACROILIAC JOINT Left 1/19/2022    Procedure: Left SIJ Injection  with RN IV sedation;  Surgeon: Silvia Buchanan MD;  Location: [facility] PAIN MGT;  Service: Pain Management;  Laterality: Left;    INJECTION OF JOINT Left 1/19/2022    Procedure: Left Hip injection by with RN IV sedation;  Surgeon: Silvia Buchanan MD;  Location: [facility] PAIN MGT;  Service: Pain Management;  Laterality: Left;    NASAL SEPTUM SURGERY      neck lipoma excision           Pre-op Assessment    I have reviewed the Patient Summary Reports.     I have reviewed the Nursing Notes. I have reviewed the NPO Status.   I have reviewed the Medications.     Review of Systems  Anesthesia Hx:  Neg history of prior surgery. Denies Family Hx of Anesthesia complications.   Denies Personal Hx of Anesthesia complications.   Hematology/Oncology:  Hematology Normal   Oncology Normal     EENT/Dental:EENT/Dental Normal   Cardiovascular:   Hypertension  Hypertension    Pulmonary:  Pulmonary Normal    Renal/:  Renal/ Normal     Hepatic/GI:   GERD    Musculoskeletal:   Arthritis     Neurological:  Neurology Normal    Endocrine:  Endocrine Normal    Dermatological:  Skin Normal    Psych:  Psychiatric Normal           Physical Exam  General: Well  nourished, Cooperative, Alert and Oriented    Airway:  Mallampati: II   Mouth Opening: Normal  TM Distance: Normal  Tongue: Normal  Neck ROM: Normal ROM    Dental:  Intact        Anesthesia Plan  Type of Anesthesia, risks & benefits discussed:    Anesthesia Type: Gen Natural Airway  Intra-op Monitoring Plan: Standard ASA Monitors  Post Op Pain Control Plan: multimodal analgesia  Induction:  IV  Informed Consent: Informed consent signed with the Patient and all parties understand the risks and agree with anesthesia plan.  All questions answered.   ASA Score: 3  Day of Surgery Review of History & Physical: H&P Update referred to the surgeon/provider.    Ready For Surgery From Anesthesia Perspective.     .

## 2023-10-13 ENCOUNTER — ANESTHESIA (OUTPATIENT)
Dept: ENDOSCOPY | Facility: HOSPITAL | Age: 48
End: 2023-10-13
Payer: COMMERCIAL

## 2023-10-13 ENCOUNTER — HOSPITAL ENCOUNTER (OUTPATIENT)
Facility: HOSPITAL | Age: 48
Discharge: HOME | End: 2023-10-13
Attending: INTERNAL MEDICINE | Admitting: INTERNAL MEDICINE
Payer: COMMERCIAL

## 2023-10-13 DIAGNOSIS — Z12.11 COLON CANCER SCREENING: Primary | ICD-10-CM

## 2023-10-13 PROCEDURE — 27201012 HC FORCEPS, HOT/COLD, DISP: Performed by: INTERNAL MEDICINE

## 2023-10-13 PROCEDURE — 25000003 PHARM REV CODE 250: Performed by: NURSE ANESTHETIST, CERTIFIED REGISTERED

## 2023-10-13 PROCEDURE — 88305 TISSUE EXAM BY PATHOLOGIST: CPT | Mod: 26,,, | Performed by: PATHOLOGY

## 2023-10-13 PROCEDURE — 63600175 PHARM REV CODE 636 W HCPCS: Performed by: INTERNAL MEDICINE

## 2023-10-13 PROCEDURE — 63600175 PHARM REV CODE 636 W HCPCS: Performed by: NURSE ANESTHETIST, CERTIFIED REGISTERED

## 2023-10-13 PROCEDURE — 45380 COLONOSCOPY AND BIOPSY: CPT | Mod: PT | Performed by: INTERNAL MEDICINE

## 2023-10-13 PROCEDURE — 37000009 HC ANESTHESIA EA ADD 15 MINS: Performed by: INTERNAL MEDICINE

## 2023-10-13 PROCEDURE — 25000003 PHARM REV CODE 250: Performed by: INTERNAL MEDICINE

## 2023-10-13 PROCEDURE — 37000008 HC ANESTHESIA 1ST 15 MINUTES: Performed by: INTERNAL MEDICINE

## 2023-10-13 PROCEDURE — 45380 PR COLONOSCOPY,BIOPSY: ICD-10-PCS | Mod: 33,,, | Performed by: INTERNAL MEDICINE

## 2023-10-13 PROCEDURE — 88305 TISSUE EXAM BY PATHOLOGIST: CPT | Performed by: PATHOLOGY

## 2023-10-13 PROCEDURE — 45380 COLONOSCOPY AND BIOPSY: CPT | Mod: 33,,, | Performed by: INTERNAL MEDICINE

## 2023-10-13 PROCEDURE — D9220A PRA ANESTHESIA: Mod: 33,,, | Performed by: NURSE ANESTHETIST, CERTIFIED REGISTERED

## 2023-10-13 PROCEDURE — 88305 TISSUE EXAM BY PATHOLOGIST: ICD-10-PCS | Mod: 26,,, | Performed by: PATHOLOGY

## 2023-10-13 PROCEDURE — D9220A PRA ANESTHESIA: ICD-10-PCS | Mod: 33,,, | Performed by: NURSE ANESTHETIST, CERTIFIED REGISTERED

## 2023-10-13 RX ORDER — PROPOFOL 10 MG/ML
VIAL (ML) INTRAVENOUS
Status: DISCONTINUED | OUTPATIENT
Start: 2023-10-13 | End: 2023-10-13

## 2023-10-13 RX ORDER — SODIUM CHLORIDE, SODIUM LACTATE, POTASSIUM CHLORIDE, CALCIUM CHLORIDE 600; 310; 30; 20 MG/100ML; MG/100ML; MG/100ML; MG/100ML
INJECTION, SOLUTION INTRAVENOUS CONTINUOUS
Status: DISCONTINUED | OUTPATIENT
Start: 2023-10-13 | End: 2023-10-13 | Stop reason: HOSPADM

## 2023-10-13 RX ORDER — DEXTROMETHORPHAN/PSEUDOEPHED 2.5-7.5/.8
DROPS ORAL
Status: DISCONTINUED | OUTPATIENT
Start: 2023-10-13 | End: 2023-10-13 | Stop reason: HOSPADM

## 2023-10-13 RX ORDER — LIDOCAINE HYDROCHLORIDE 20 MG/ML
INJECTION, SOLUTION EPIDURAL; INFILTRATION; INTRACAUDAL; PERINEURAL
Status: DISCONTINUED | OUTPATIENT
Start: 2023-10-13 | End: 2023-10-13

## 2023-10-13 RX ORDER — SODIUM CHLORIDE 9 MG/ML
INJECTION, SOLUTION INTRAVENOUS CONTINUOUS
Status: DISCONTINUED | OUTPATIENT
Start: 2023-10-13 | End: 2023-10-13 | Stop reason: HOSPADM

## 2023-10-13 RX ADMIN — PROPOFOL 50 MG: 10 INJECTION, EMULSION INTRAVENOUS at 10:10

## 2023-10-13 RX ADMIN — PROPOFOL 40 MG: 10 INJECTION, EMULSION INTRAVENOUS at 10:10

## 2023-10-13 RX ADMIN — LIDOCAINE HYDROCHLORIDE 40 MG: 20 INJECTION, SOLUTION EPIDURAL; INFILTRATION; INTRACAUDAL; PERINEURAL at 10:10

## 2023-10-13 RX ADMIN — PROPOFOL 110 MG: 10 INJECTION, EMULSION INTRAVENOUS at 10:10

## 2023-10-13 RX ADMIN — SODIUM CHLORIDE, POTASSIUM CHLORIDE, SODIUM LACTATE AND CALCIUM CHLORIDE: 600; 310; 30; 20 INJECTION, SOLUTION INTRAVENOUS at 08:10

## 2023-10-13 NOTE — LETTER
October 16, 2023         [address] [facility] [address]  Phone: [phone]  Fax: [phone]       Patient: Yoandy Forde   YOB: 1975  Date of Visit: 10/16/2023    To Whom It May Concern:    Yoandy Forde  was at [facility] on 10/13/2023. The patient may return to work/school on 10/14/2023 with no restrictions. If you have any questions or concerns, or if I can be of further assistance, please do not hesitate to contact me.    Sincerely,    Dr. Golden

## 2023-10-13 NOTE — PROVATION PATIENT INSTRUCTIONS
Discharge Summary/Instructions after an Endoscopic Procedure  Patient Name: Yoandy Forde  Patient MRN: [id number]  Patient YOB: 1975 Friday, October 13, 2023  Ivon Golden MD  Dear patient,  As a result of recent federal legislation (The Federal Cures Act), you may   receive lab or pathology results from your procedure in your [facility]   account before your physician is able to contact you. Your physician or   their representative will relay the results to you with their   recommendations at their soonest availability.  Thank you,  RESTRICTIONS:  During your procedure today, you received medications for sedation.  These   medications may affect your judgment, balance and coordination.  Therefore,   for 24 hours, you have the following restrictions:   - DO NOT drive a car, operate machinery, make legal/financial decisions,   sign important papers or drink alcohol.    ACTIVITY:  Today: no heavy lifting, straining or running due to procedural   sedation/anesthesia.  The following day: return to full activity including work.  DIET:  Eat and drink normally unless instructed otherwise.     TREATMENT FOR COMMON SIDE EFFECTS:  - Mild abdominal pain, nausea, belching, bloating or excessive gas:  rest,   eat lightly and use a heating pad.  - Sore Throat: treat with throat lozenges and/or gargle with warm salt   water.  - Because air was used during the procedure, expelling large amounts of air   from your rectum or belching is normal.  - If a bowel prep was taken, you may not have a bowel movement for 1-3 days.    This is normal.  SYMPTOMS TO WATCH FOR AND REPORT TO YOUR PHYSICIAN:  1. Abdominal pain or bloating, other than gas cramps.  2. Chest pain.  3. Back pain.  4. Signs of infection such as: chills or fever occurring within 24 hours   after the procedure.  5. Rectal bleeding, which would show as bright red, maroon, or black stools.   (A tablespoon of blood from the rectum is not serious, especially  if   hemorrhoids are present.)  6. Vomiting.  7. Weakness or dizziness.  GO DIRECTLY TO THE NEAREST EMERGENCY ROOM IF YOU HAVE ANY OF THE FOLLOWING:      Difficulty breathing              Chills and/or fever over 101 F   Persistent vomiting and/or vomiting blood   Severe abdominal pain   Severe chest pain   Black, tarry stools   Bleeding- more than one tablespoon   Any other symptom or condition that you feel may need urgent attention  Your doctor recommends these additional instructions:  If any biopsies were taken, your doctors clinic will contact you in 1 to 2   weeks with any results.  - Discharge patient to home.   - Resume previous diet.   - Continue present medications.   - Await pathology results.   - Repeat colonoscopy in 5 years for surveillance.   - Return to referring physician.   - Patient has a contact number available for emergencies.  The signs and   symptoms of potential delayed complications were discussed with the   patient.  Return to normal activities tomorrow.  Written discharge   instructions were provided to the patient.  For questions, problems or results please call your physician Ivon Golden MD at Work:  [phone]  If you have any questions about the above instructions, call the GI   department at [phone] or call the endoscopy unit at [phone]   from 7am until 3 pm.  [facility], EMERGENCY ROOM PHONE NUMBER:   [phone]  IF A COMPLICATION OR EMERGENCY SITUATION ARISES AND YOU ARE UNABLE TO REACH   YOUR PHYSICIAN - GO DIRECTLY TO THE EMERGENCY ROOM.  I have read or have had read to me these discharge instructions for my   procedure and have received a written copy.  I understand these   instructions and will follow-up with my physician if I have any questions.     __________________________________       _____________________________________  Nurse Signature                                          Patient/Designated   Responsible  Party Signature  Ivon Golden MD  10/13/2023 10:29:49 AM  This report has been verified and signed electronically.  Dear patient,  As a result of recent federal legislation (The Federal Cures Act), you may   receive lab or pathology results from your procedure in your [facility]   account before your physician is able to contact you. Your physician or   their representative will relay the results to you with their   recommendations at their soonest availability.  Thank you,  PROVATION

## 2023-10-13 NOTE — ANESTHESIA POSTPROCEDURE EVALUATION
Anesthesia Post Evaluation    Patient: Yoandy Forde    Procedure(s) Performed: Procedure(s) (LRB):  COLONOSCOPY  Cardiac clearance received on 10/05/23 per Dr. Valentine, Cardiology.  Note in encounters.  LB (N/A)    Final Anesthesia Type: general      Patient location during evaluation: PACU  Patient participation: Yes- Able to Participate  Level of consciousness: awake  Post-procedure vital signs: reviewed and stable  Pain management: adequate  Airway patency: patent    PONV status at discharge: No PONV  Anesthetic complications: no      Cardiovascular status: stable  Respiratory status: unassisted  Hydration status: euvolemic  Follow-up not needed.          Vitals Value Taken Time   /97 10/13/23 1050   Temp 36.4 °C (97.6 °F) 10/13/23 1030   Pulse 75 10/13/23 1050   Resp 17 10/13/23 1050   SpO2 99 % 10/13/23 1050         No case tracking events are documented in the log.      Pain/Agnes Score: Agnes Score: 10 (10/13/2023 10:30 AM)

## 2023-10-13 NOTE — LETTER
October 16, 2023         [address] [facility] [address]  Phone: [phone]  Fax: [phone]       Patient: Yoandy Forde   YOB: 1975  Date of Visit: 10/16/2023    To Whom It May Concern:    Yomaira Forde  was at [facility] on 10/13/23. The patient may return to work/son 10/14/2023 with no restrictions. If you have any questions or concerns, or if I can be of further assistance, please do not hesitate to contact me.    Sincerely,    Esperanza Jones RN

## 2023-10-13 NOTE — H&P
PRE PROCEDURE H&P    Patient Name: Yoandy Forde  MRN: [id number]  : 1975  Date of Procedure:  10/13/2023  Referring Physician: Mary Feliciano MD  Primary Physician: Mary Feliciano MD  Procedure Physician: Iovn Golden MD       Planned Procedure: Colonoscopy  Diagnosis: screening for colon cancer  Chief Complaint: Same as above    HPI: Patient is an 48 y.o. male is here for the above.     Last colonoscopy: Index colon  Family history: None  Anticoagulation: None    Past Medical History:   Past Medical History:   Diagnosis Date    Hyperglycemia     Hypertension     Lupus erythematosus     MCTD (mixed connective tissue disease)     Dr Branham     Vitiligo         Past Surgical History:  Past Surgical History:   Procedure Laterality Date    INJECTION OF ANESTHETIC AGENT AROUND NERVE Left 3/14/2022    Procedure: Left-sided femoral and obturator nerve block with RN IV sedation;  Surgeon: Silvia Buchanan MD;  Location: [facility] PAIN MGT;  Service: Pain Management;  Laterality: Left;    INJECTION OF ANESTHETIC AGENT INTO SACROILIAC JOINT Left 2022    Procedure: Left SIJ Injection  with RN IV sedation;  Surgeon: Silvia Buchanan MD;  Location: [facility] PAIN MGT;  Service: Pain Management;  Laterality: Left;    INJECTION OF JOINT Left 2022    Procedure: Left Hip injection by with RN IV sedation;  Surgeon: Silvia Buchanan MD;  Location: [facility] PAIN MGT;  Service: Pain Management;  Laterality: Left;    NASAL SEPTUM SURGERY      neck lipoma excision          Home Medications:  Prior to Admission medications    Medication Sig Start Date End Date Taking? Authorizing Provider   amLODIPine (NORVASC) 10 MG tablet TAKE 1 TABLET(10 MG) BY MOUTH EVERY DAY 23  Yes Mary Feliciano MD   fenofibrate (TRICOR) 145 MG tablet Take 1 tablet (145 mg total) by mouth once daily. 23  Yes Mary Feliciano MD   fluticasone propionate (FLONASE) 50 mcg/actuation nasal spray 1 spray (50 mcg total) by Each Nostril route daily as needed for  Rhinitis or Allergies. 8/21/23  Yes Mary Feliciano MD   hydrOXYchloroQUINE (PLAQUENIL) 200 mg tablet Take 1 tablet (200 mg total) by mouth 2 (two) times daily. 8/17/23  Yes Ramon Servin MD   naproxen (NAPROSYN) 375 MG tablet Take 1 tablet (375 mg total) by mouth 2 (two) times daily with meals. 7/10/23  Yes Silvino Wei MD   omeprazole (PRILOSEC) 40 MG capsule Take 1 capsule (40 mg total) by mouth every morning. 4/24/23 4/23/24 Yes Mary Feliciano MD   clobetasol 0.05% (TEMOVATE) 0.05 % Oint Apply topically 2 (two) times daily. 3/16/23   Kennedy Horvath MD   diclofenac sodium (PENNSAID) 20 mg/gram /actuation(2 %) sopm Apply 2 Pump topically 2 (two) times daily. 1/25/18   Cherie Pena PA-C   meloxicam (MOBIC) 15 MG tablet Take 1 tablet (15 mg total) by mouth once daily.  Patient not taking: Reported on 8/28/2023 9/2/22   Janet Bennett PA-C   rosuvastatin (CRESTOR) 10 MG tablet Take 1 tablet (10 mg total) by mouth once daily.  Patient not taking: Reported on 8/28/2023 8/3/23 1/30/24  Mary Feliciano MD   tadalafiL (CIALIS) 5 MG tablet TAKE 2 TABLETS(10 MG) BY MOUTH DAILY AS NEEDED FOR ERECTILE DYSFUNCTION  Patient not taking: Reported on 8/28/2023 8/3/23   Mary Feliciano MD   triamcinolone acetonide 0.1% (KENALOG) 0.1 % ointment AAA bid 9/16/22   Kennedy Horvath MD        Allergies:  Review of patient's allergies indicates:   Allergen Reactions    Shellfish containing products     Insect venom     Sulfa (sulfonamide antibiotics) Hives    Sulfacetamide sodium-sulfur      Other reaction(s): Unknown    Sulfamethoxazole-trimethoprim Hives     Other reaction(s): Unknown        Social History:   Social History     Socioeconomic History    Marital status:      Spouse name: ASHWIN    Number of children: 2   Occupational History    Occupation: [employer]     Employer: [employer]   Tobacco Use    Smoking status: Some Days     Current packs/day: 0.50     Average packs/day: 0.5 packs/day for 28.5 years (14.2  "ttl pk-yrs)     Types: Cigarettes     Start date: 4/14/1995    Smokeless tobacco: Never   Substance and Sexual Activity    Alcohol use: Yes     Alcohol/week: 0.0 standard drinks of alcohol     Comment: occasional use    Drug use: Never    Sexual activity: Yes     Partners: Female     Social Determinants of Health     Financial Resource Strain: Medium Risk (7/16/2020)    Overall Financial Resource Strain (CARDIA)     Difficulty of Paying Living Expenses: Somewhat hard   Food Insecurity: Food Insecurity Present (7/16/2020)    Hunger Vital Sign     Worried About Running Out of Food in the Last Year: Sometimes true     Ran Out of Food in the Last Year: Sometimes true   Transportation Needs: No Transportation Needs (7/16/2020)    PRAPARE - Transportation     Lack of Transportation (Medical): No     Lack of Transportation (Non-Medical): No   Physical Activity: Sufficiently Active (7/16/2020)    Exercise Vital Sign     Days of Exercise per Week: 5 days     Minutes of Exercise per Session: 50 min   Stress: Stress Concern Present (7/16/2020)    [employer] - Occupational Stress Questionnaire     Feeling of Stress : To some extent   Social Connections: Unknown (7/24/2020)    Social Connection and Isolation Panel [NHANES]     Attends [employer]: More than 4 times per year       Family History:  Family History   Problem Relation Age of Onset    Diabetes Mother     Hypertension Mother     Stroke Father     Hypertension Father     Asthma Brother     Heart disease Sister     Aneurysm Sister        ROS: No acute cardiac events, no acute respiratory complaints.     Physical Exam (all patients):    BP (!) 170/92 (BP Location: Right arm, Patient Position: Sitting)   Pulse 81   Temp 97.8 °F (36.6 °C) (Temporal)   Resp 16   Ht 5' 11" (1.803 m)   Wt 109.6 kg (241 lb 11.8 oz)   SpO2 100%   BMI 33.72 kg/m²   Lungs: Clear to auscultation bilaterally, respirations unlabored  Heart: Regular rate and " rhythm, S1 and S2 normal, no obvious murmurs  Abdomen:         Soft, non-tender, bowel sounds normal, no masses, no organomegaly    Lab Results   Component Value Date    WBC 5.65 07/10/2023    MCV 89 07/10/2023    RDW 13.2 07/10/2023     07/10/2023    GLU 90 07/10/2023    HGBA1C 5.2 04/24/2023    BUN 17 07/10/2023     07/10/2023    K 3.9 07/10/2023     07/10/2023        SEDATION PLAN: per anesthesia      History reviewed, vital signs satisfactory, cardiopulmonary status satisfactory, sedation options, risks and plans have been discussed with the patient  All their questions were answered and the patient agrees to the sedation procedures as planned and the patient is deemed an appropriate candidate for the sedation as planned.    Procedure explained to patient, informed consent obtained and placed in chart.    Ivon Golden  10/13/2023  10:09 AM

## 2023-10-13 NOTE — TRANSFER OF CARE
"Anesthesia Transfer of Care Note    Patient: Yoandy Forde    Procedure(s) Performed: Procedure(s) (LRB):  COLONOSCOPY  Cardiac clearance received on 10/05/23 per Dr. Valentine, Cardiology.  Note in encounters.  LB (N/A)    Patient location: PACU    Anesthesia Type: general    Transport from OR: Transported from OR on room air with adequate spontaneous ventilation    Post pain: adequate analgesia    Post assessment: no apparent anesthetic complications and tolerated procedure well    Post vital signs: stable    Level of consciousness: awake    Nausea/Vomiting: no nausea/vomiting    Complications: none    Transfer of care protocol was followed      Last vitals:   Visit Vitals  BP (!) 170/92 (BP Location: Right arm, Patient Position: Sitting)   Pulse 81   Temp 36.6 °C (97.8 °F) (Temporal)   Resp 16   Ht 5' 11" (1.803 m)   Wt 109.6 kg (241 lb 11.8 oz)   SpO2 100%   BMI 33.72 kg/m²     "

## 2023-10-13 NOTE — PLAN OF CARE
Discharge instructions reviewed with pt and spouse, handouts given, verbalized understanding with no further questions at this time. Dr. Golden spoke to pt at bedside, reviewed procedure and answered questions aware they are awaiting biopsy results with MD telephone number provided per AVS sheet. VSS on RA, no pain or nausea noted, tolerating po fluids without difficulty, no other complaints noted. Fall precautions reviewed, consents in chart, PIV removed.

## 2023-10-13 NOTE — LETTER
October 16, 2023         [address] [facility] [address]  Phone: [phone]  Fax: [phone]       Patient: Yoandy Forde   YOB: 1975  Date of Visit: 10/16/2023    To Whom It May Concern:    Yomaira Forde  was at [facility] on 10/16/2023. The patient may return to work/school on *** {With/no:71779} restrictions. If you have any questions or concerns, or if I can be of further assistance, please do not hesitate to contact me.    Sincerely,    Fior Adams RN

## 2023-10-16 VITALS
DIASTOLIC BLOOD PRESSURE: 98 MMHG | WEIGHT: 241.75 LBS | OXYGEN SATURATION: 99 % | RESPIRATION RATE: 16 BRPM | TEMPERATURE: 98 F | BODY MASS INDEX: 33.85 KG/M2 | SYSTOLIC BLOOD PRESSURE: 164 MMHG | HEIGHT: 71 IN | HEART RATE: 74 BPM

## 2023-10-20 LAB
FINAL PATHOLOGIC DIAGNOSIS: NORMAL
Lab: NORMAL

## 2023-10-29 NOTE — PROGRESS NOTES
The polyp removed was benign and has no cancerous potential. It was completely removed. Guidelines recommend a repeat colonoscopy in 10 years. We will update your health maintenance reminder and send you a reminder as the time nears.    Thanks for trusting us with your healthcare needs and using [facility].     Sincerely,    Cherie Card M.D.

## 2023-11-22 ENCOUNTER — OFFICE VISIT (OUTPATIENT)
Dept: PODIATRY | Facility: CLINIC | Age: 48
End: 2023-11-22
Payer: COMMERCIAL

## 2023-11-22 VITALS — HEIGHT: 71 IN | BODY MASS INDEX: 33.74 KG/M2 | WEIGHT: 241 LBS

## 2023-11-22 DIAGNOSIS — M72.2 PLANTAR FASCIITIS: Primary | ICD-10-CM

## 2023-11-22 DIAGNOSIS — B35.3 TINEA PEDIS, RIGHT: ICD-10-CM

## 2023-11-22 DIAGNOSIS — M62.462 GASTROCNEMIUS EQUINUS OF LEFT LOWER EXTREMITY: ICD-10-CM

## 2023-11-22 PROCEDURE — 3008F PR BODY MASS INDEX (BMI) DOCUMENTED: ICD-10-PCS | Mod: CPTII,S$GLB,, | Performed by: PODIATRIST

## 2023-11-22 PROCEDURE — 99204 OFFICE O/P NEW MOD 45 MIN: CPT | Mod: 25,S$GLB,, | Performed by: PODIATRIST

## 2023-11-22 PROCEDURE — 99999 PR PBB SHADOW E&M-EST. PATIENT-LVL III: CPT | Mod: PBBFAC,,, | Performed by: PODIATRIST

## 2023-11-22 PROCEDURE — 20550 NJX 1 TENDON SHEATH/LIGAMENT: CPT | Mod: LT,S$GLB,, | Performed by: PODIATRIST

## 2023-11-22 PROCEDURE — 20550 PR INJECT TENDON SHEATH/LIGAMENT: ICD-10-PCS | Mod: LT,S$GLB,, | Performed by: PODIATRIST

## 2023-11-22 PROCEDURE — 99999 PR PBB SHADOW E&M-EST. PATIENT-LVL III: ICD-10-PCS | Mod: PBBFAC,,, | Performed by: PODIATRIST

## 2023-11-22 PROCEDURE — 1159F MED LIST DOCD IN RCRD: CPT | Mod: CPTII,S$GLB,, | Performed by: PODIATRIST

## 2023-11-22 PROCEDURE — 3044F HG A1C LEVEL LT 7.0%: CPT | Mod: CPTII,S$GLB,, | Performed by: PODIATRIST

## 2023-11-22 PROCEDURE — 3008F BODY MASS INDEX DOCD: CPT | Mod: CPTII,S$GLB,, | Performed by: PODIATRIST

## 2023-11-22 PROCEDURE — 1159F PR MEDICATION LIST DOCUMENTED IN MEDICAL RECORD: ICD-10-PCS | Mod: CPTII,S$GLB,, | Performed by: PODIATRIST

## 2023-11-22 PROCEDURE — 99204 PR OFFICE/OUTPT VISIT, NEW, LEVL IV, 45-59 MIN: ICD-10-PCS | Mod: 25,S$GLB,, | Performed by: PODIATRIST

## 2023-11-22 PROCEDURE — 3044F PR MOST RECENT HEMOGLOBIN A1C LEVEL <7.0%: ICD-10-PCS | Mod: CPTII,S$GLB,, | Performed by: PODIATRIST

## 2023-11-22 RX ORDER — TRIAMCINOLONE ACETONIDE 40 MG/ML
40 INJECTION, SUSPENSION INTRA-ARTICULAR; INTRAMUSCULAR ONCE
Status: COMPLETED | OUTPATIENT
Start: 2023-11-22 | End: 2023-11-22

## 2023-11-22 RX ORDER — KETOCONAZOLE 20 MG/G
CREAM TOPICAL 2 TIMES DAILY
Qty: 15 G | Refills: 1 | Status: SHIPPED | OUTPATIENT
Start: 2023-11-22

## 2023-11-22 RX ADMIN — TRIAMCINOLONE ACETONIDE 40 MG: 40 INJECTION, SUSPENSION INTRA-ARTICULAR; INTRAMUSCULAR at 01:11

## 2023-11-22 NOTE — PATIENT INSTRUCTIONS
Thank you for choosing [facility] and Dr. Shelli Fox and her team to take care of you.      I have provided further information for you about your diagnoses and/or aftercare for treatment.     You may receive a survey asking you about your visit today. We hope that we have provided you with a 5-star experience! If you felt that you received exemplary care today, please consider leaving us this feedback on the survey that you will receive in the next few days.     The survey might arrive via email, Ubooly messages, text messages, or paper mail.    We sincerely appreciate you!      Sincerely,  Dr. Shelli Fox            THEN APPLY MICONAZOLE ANTIFUNGAL POWDER (LAST) TO BOTTOM OF BOTH FEET    WASH YOUR FEET WHEN SHOWERING WITH ANTIBACTERIAL (DIAL) SOAP  Wear cotton socks (80%) or open shoes and sandals.  KEEP YOUR FEET DRY!          Athletes Foot    Athletes Foot is caused by a fungal infection in the skin. It affects the skin between the toes where it causes fissures (cracks in the skin). It can also affect the bottom of the foot where it causes dry white scales and peeling of the skin. This infection is more likely to occur when the foot is in hot, sweaty socks and shoes for long periods of time.  This infection is treated with skin creams or oral medicine.  Home Care:  It is important to keep the feet dry. Use absorbent cotton socks and change them if they become sweaty; or wear an open-toe shoe or sandal. Wash the feet at least once a day with soap and water.  Apply the antifungal cream as prescribed. Some antifungal creams are available without a prescription (Lotrimin, Tinactin).  It may take a week before the rash starts to improve and it can take about three to four weeks to completely clear. Continue the medicine until the rash is all gone.  Use over-the-counter antifungal powders or sprays on your feet after exposure to high-risk environments (public showers, gyms and locker rooms) may  prevent future infections. You may wish to use appropriate footwear to reduce exposure.  Follow Up  with your doctor as recommended by our staff if the rash is not starting to improve after TEN days of treatment, or if the rash continues to spread.  Get Prompt Medical Attention  if any of the following occur:  Increasing redness or swelling of the foot  Pus draining from cracks in the skin  Fever of 100.4ºF (38ºC) or higher, or as directed by your healthcare provider  © 2128-4161 [facility], [address] [place] [address], [place] [address]. All rights reserved. This information is not intended as a substitute for professional medical care. Always follow your healthcare professional's instructions.

## 2023-11-22 NOTE — PROGRESS NOTES
PODIATRIC MEDICINE AND SURGERY      CHIEF COMPLAINT  Chief Complaint   Patient presents with    Foot Pain     C/o pain in the left foot, the pain is on the heel , pt states the pain been going on for a about a month, pt says when he walk all day for work so that can be a cause of the pain, pt rates the pain 8/10 today, he states he might want the injection, he is also c/o athletes feet on his right foot, he been treating it with spray he says that tends to help,  pt is non-diabetic         HPI:    Yoandy Forde is a 48 y.o. male who complains of pain to the left heel.  Pain is described as achy.  Pain is worse in the morning and after periods of sitting down and then getting up and walking again.   Pain is rated to be 8/10 by the patient on a 1-10 scale.   Pain has been present for one month.  Pain is worsening.  Pain is aggravated by weight bearing .  Pain is improved by rest.  Previous treatments include: massaging and stretching.  He also complains about fungal infection in feet.     Work: [employer]     PMH  Past Medical History:   Diagnosis Date    Hyperglycemia     Hypertension     Lupus erythematosus     MCTD (mixed connective tissue disease)     Dr Branham     Vitiligo         [facility]  Past Surgical History:   Procedure Laterality Date    COLONOSCOPY N/A 10/13/2023    Procedure: COLONOSCOPY  Cardiac clearance received on 10/05/23 per Dr. Valentine, Cardiology.  Note in encounters.  LB;  Surgeon: Ivon Golden MD;  Location: [facility];  Service: Gastroenterology;  Laterality: N/A;    INJECTION OF ANESTHETIC AGENT AROUND NERVE Left 3/14/2022    Procedure: Left-sided femoral and obturator nerve block with RN IV sedation;  Surgeon: Silvia Buchanan MD;  Location: [facility];  Service: Pain Management;  Laterality: Left;    INJECTION OF ANESTHETIC AGENT INTO SACROILIAC JOINT Left 1/19/2022    Procedure: Left SIJ Injection  with RN IV sedation;  Surgeon: Silvia Buchanan MD;  Location: [facility];  Service:  Pain Management;  Laterality: Left;    INJECTION OF JOINT Left 1/19/2022    Procedure: Left Hip injection by with RN IV sedation;  Surgeon: Silvia Buchanan MD;  Location: [facility];  Service: Pain Management;  Laterality: Left;    NASAL SEPTUM SURGERY      neck lipoma excision          MEDS  Current Outpatient Medications on File Prior to Visit   Medication Sig Dispense Refill    amLODIPine (NORVASC) 10 MG tablet TAKE 1 TABLET(10 MG) BY MOUTH EVERY DAY 90 tablet 3    clobetasol 0.05% (TEMOVATE) 0.05 % Oint Apply topically 2 (two) times daily. 60 g 1    diclofenac sodium (PENNSAID) 20 mg/gram /actuation(2 %) sopm Apply 2 Pump topically 2 (two) times daily. 1 Bottle 6    fenofibrate (TRICOR) 145 MG tablet Take 1 tablet (145 mg total) by mouth once daily. 90 tablet 0    fluticasone propionate (FLONASE) 50 mcg/actuation nasal spray 1 spray (50 mcg total) by Each Nostril route daily as needed for Rhinitis or Allergies. 16 g 3    hydrOXYchloroQUINE (PLAQUENIL) 200 mg tablet Take 1 tablet (200 mg total) by mouth 2 (two) times daily. 60 tablet 3    meloxicam (MOBIC) 15 MG tablet Take 1 tablet (15 mg total) by mouth once daily. 30 tablet 2    naproxen (NAPROSYN) 375 MG tablet Take 1 tablet (375 mg total) by mouth 2 (two) times daily with meals. 60 tablet 0    omeprazole (PRILOSEC) 40 MG capsule Take 1 capsule (40 mg total) by mouth every morning. 30 capsule 3    rosuvastatin (CRESTOR) 10 MG tablet Take 1 tablet (10 mg total) by mouth once daily. 90 tablet 1    tadalafiL (CIALIS) 5 MG tablet TAKE 2 TABLETS(10 MG) BY MOUTH DAILY AS NEEDED FOR ERECTILE DYSFUNCTION 30 tablet 0    triamcinolone acetonide 0.1% (KENALOG) 0.1 % ointment AAA bid 80 g 0     No current facility-administered medications on file prior to visit.      Medication List with Changes/Refills   New Medications    KETOCONAZOLE (NIZORAL) 2 % CREAM    Apply topically 2 (two) times daily. To fungal infection on foot   Current Medications    AMLODIPINE (NORVASC)  10 MG TABLET    TAKE 1 TABLET(10 MG) BY MOUTH EVERY DAY    CLOBETASOL 0.05% (TEMOVATE) 0.05 % OINT    Apply topically 2 (two) times daily.    DICLOFENAC SODIUM (PENNSAID) 20 MG/GRAM /ACTUATION(2 %) SOPM    Apply 2 Pump topically 2 (two) times daily.    FENOFIBRATE (TRICOR) 145 MG TABLET    Take 1 tablet (145 mg total) by mouth once daily.    FLUTICASONE PROPIONATE (FLONASE) 50 MCG/ACTUATION NASAL SPRAY    1 spray (50 mcg total) by Each Nostril route daily as needed for Rhinitis or Allergies.    HYDROXYCHLOROQUINE (PLAQUENIL) 200 MG TABLET    Take 1 tablet (200 mg total) by mouth 2 (two) times daily.    MELOXICAM (MOBIC) 15 MG TABLET    Take 1 tablet (15 mg total) by mouth once daily.    NAPROXEN (NAPROSYN) 375 MG TABLET    Take 1 tablet (375 mg total) by mouth 2 (two) times daily with meals.    OMEPRAZOLE (PRILOSEC) 40 MG CAPSULE    Take 1 capsule (40 mg total) by mouth every morning.    ROSUVASTATIN (CRESTOR) 10 MG TABLET    Take 1 tablet (10 mg total) by mouth once daily.    TADALAFIL (CIALIS) 5 MG TABLET    TAKE 2 TABLETS(10 MG) BY MOUTH DAILY AS NEEDED FOR ERECTILE DYSFUNCTION    TRIAMCINOLONE ACETONIDE 0.1% (KENALOG) 0.1 % OINTMENT    AAA bid       ALLG  Review of patient's allergies indicates:   Allergen Reactions    Shellfish containing products     Insect venom     Sulfa (sulfonamide antibiotics) Hives    Sulfacetamide sodium-sulfur      Other reaction(s): Unknown    Sulfamethoxazole-trimethoprim Hives     Other reaction(s): Unknown       SOCIAL Hx  Social History     Socioeconomic History    Marital status:      Spouse name: ASHWIN    Number of children: 2   Occupational History    Occupation: [employer]     Employer: [employer]   Tobacco Use    Smoking status: Every Day     Current packs/day: 0.50     Average packs/day: 0.5 packs/day for 28.6 years (14.3 ttl pk-yrs)     Types: Cigarettes     Start date: 4/14/1995    Smokeless tobacco: Never   Substance and Sexual Activity    Alcohol use: Yes      Alcohol/week: 0.0 standard drinks of alcohol     Comment: occasional use    Drug use: Never    Sexual activity: Yes     Partners: Female     Social Determinants of Health     Financial Resource Strain: Medium Risk (7/16/2020)    Overall Financial Resource Strain (CARDIA)     Difficulty of Paying Living Expenses: Somewhat hard   Food Insecurity: Food Insecurity Present (7/16/2020)    Hunger Vital Sign     Worried About Running Out of Food in the Last Year: Sometimes true     Ran Out of Food in the Last Year: Sometimes true   Transportation Needs: No Transportation Needs (7/16/2020)    PRAPARE - Transportation     Lack of Transportation (Medical): No     Lack of Transportation (Non-Medical): No   Physical Activity: Sufficiently Active (7/16/2020)    Exercise Vital Sign     Days of Exercise per Week: 5 days     Minutes of Exercise per Session: 50 min   Stress: Stress Concern Present (7/16/2020)    Citizen of [place] [employer] - Occupational Stress Questionnaire     Feeling of Stress : To some extent   Social Connections: Unknown (7/24/2020)    Social Connection and Isolation Panel [NHANES]     Attends [employer] Services: More than 4 times per year       FAM Hx  Family History   Problem Relation Age of Onset    Diabetes Mother     Hypertension Mother     Stroke Father     Hypertension Father     Asthma Brother     Heart disease Sister     Aneurysm Sister        REVIEW OF SYSTEMS  General: This patient is well-developed, well-nourished and appears stated age, well-oriented to person, place and time, and cooperative and pleasant on today's visit  Constitutional: Negative for chills and fever.   Respiratory: Negative for shortness of breath.    Cardiovascular: Negative for chest pain, palpitations, orthopnea  Gastrointestinal: Negative for diarrhea, nausea and vomiting.   Musculoskeletal: Positive for above noted in HPI  Skin: positive for skin changes   Neurological: Negative for tingling and sensory  "changes  Peripheral Vascular: no claudication or cyanosis  Psychiatric/Behavioral: Negative for altered mental status       OBJECTIVE:  Vitals:    11/22/23 0825   Weight: 109.3 kg (241 lb)   Height: 5' 11" (1.803 m)   PainSc:   8       LOWER EXTREMITY  VASCULAR  Dorsalis pedis and posterior tibial pulses palpable 2/4 bilaterally.   Capillary refill time immediate to the toes.   Feet are warm to the touch. Skin temperature warm to warm from proximally to distally   There are no varicosities, telangiectasias noted to bilateral foot and ankle regions.   There are no ecchymoses noted to bilateral foot and ankle regions.   There is no gross lower extremity edema.    DERMATOLOGIC  Skin moist with healthy texture and turgor.  There are no open ulcerations, lacerations, or fissures to bilateral foot and ankle regions. There are no signs of infection as there is no erythema, no proximal-extending lymphangiitis, no fluctuance, or crepitus noted on palpation to bilateral foot and ankle regions.   There is interdigital maceration.   There are scales with hyperkeratotic lesions noted to feet    NEUROLOGIC  Epicritic sensation is intact as the patient is able to sense light touch to bilateral foot and ankle regions. There is a negative Tinel's sign on percussion of the tibial nerve, dorsal cutaneous nerves, sural nerves of the RIGHT foot.  Achilles and patellar deep tendon reflexes intact  Babinski reflex absent    ORTHOPEDIC/BIOMECHANICAL  Pain on palpation plantar medial tubercle of calcaneus, RIGHT  foot. No pain along plantar fascia bilaterally.   No pain with medial-lateral compression of calcaneus.  5/5 muscle strength in all 4 quadrants.   Ankle joint range of motion decreased with knee extended and flexed b/l.    IMAGING   Reviewed by me and I agree with radiologist findings, 3 views of foot/ankle, reveal:  Results for orders placed during the hospital encounter of 05/25/16    X-Ray Foot Complete 3 view " Bilateral    Narrative  3 views of either foot, 6 views total    Comparison: 08/20/2015    Findings: There is no evidence to suggest acute fracture or dislocation.  Small dorsal calcaneal enthesophyte noted on the left.  Moderate sized dorsal and plantar calcaneal enthesophytes present on the right.  The joint spaces appear to be relatively well-maintained.  IMPRESSION:  As above  ______________________________________    Electronically signed by: JESSICA JACQUES D.O.  Date:     05/25/16  Time:    16:52          Results for orders placed during the hospital encounter of 05/25/16    X-Ray Foot Complete 3 view Bilateral    Narrative  3 views of either foot, 6 views total    Comparison: 08/20/2015    Findings: There is no evidence to suggest acute fracture or dislocation.  Small dorsal calcaneal enthesophyte noted on the left.  Moderate sized dorsal and plantar calcaneal enthesophytes present on the right.  The joint spaces appear to be relatively well-maintained.  IMPRESSION:  As above  ______________________________________    Electronically signed by: JESSICA JACQUES D.O.  Date:     05/25/16  Time:    16:52      Results for orders placed during the hospital encounter of 01/14/19    X-Ray Foot Complete Left    Narrative  EXAMINATION:  XR FOOT COMPLETE 3 VIEW LEFT    CLINICAL HISTORY:  .  Pain in left foot    TECHNIQUE:  AP, lateral and oblique views of the left foot were performed.    COMPARISON:  05/25/2016    FINDINGS:  There is no radiographic evidence of acute osseous, articular, or soft tissue abnormality.  Joint spaces are well preserved.  No erosive changes demonstrated. Small dorsal surface calcaneal enthesophyte noted.  No appreciable change from prior.    IMPRESSION:    As Above.  No acute findings.      Electronically signed by: Stevie Moreno MD  Date:    01/14/2019  Time:    16:12       No results found for this or any previous visit.        ASSESSMENT     Encounter Diagnoses   Name Primary?     Plantar fasciitis Yes    Tinea pedis, right     Gastrocnemius equinus of left lower extremity          PLAN:   1. The patient was examined and evaluated   2. Treatment options were discussed in detail; the patient elected to undergo conservative treatment. Discussed different treatment options for heel pain.     Plantar fasciitis    Tinea pedis, right    Gastrocnemius equinus of left lower extremity    Other orders  -     ketoconazole (NIZORAL) 2 % cream; Apply topically 2 (two) times daily. To fungal infection on foot  Dispense: 15 g; Refill: 1  -     triamcinolone acetonide injection 40 mg        3. I gave written and verbal instructions on heel cord stretching and this was demonstrated for the patient. A theraband was also provided to the patient for stretching exercises. Recommendations were given for plantar fasciitis treatment including icing, stretching, arch supports, avoidance of barefoot walking, appropriate shoe wear, and strict compliance. Discussed importance of patient to perform stretching exercises.   4. Prescription was written for OTC arch supports.  I Discussed that plantar fasciitis typically resolves on its own in a variable amount of time. If no improvement, will proceed with cortisone injection. I also  discussed possible tx with SLC, PT, Dynasplint and custom orthotics.Surgical intervention is the last resort for plantar fasciitis.     Pt would like to proceed with steroid injection today.  After verbal consent, the left  heel was injected from a medial approach with 1 mL of 0.5% marcaine plain and 1 mL of Kenalog 40. Topical ethyl chloride was used for the patient's comfort. The patient tolerated well. A band-aid was applied over the injection site. Discussed steroid flare reaction. Pt advised to ice afterwards.     For the athletes foot, patient was prescribed antifungal to apply between the toes and to the bottoms of feet twice a day. In addition, recommendations were made to spray and  disinfect shoes with Lysol and to alternate shoes. Also, for excessive sweating patient was instructed to use Arid Extra Dry spray on the bottom of the feet with Tinactin powder in the digital interspaces.    5. Information was given regarding the patient's condition and prognosis. All the patient's questions were answered.   6. Return to clinic in 4-6 weeks for follow up evaluation, or sooner if symptoms worsen.   7. The patient is welcome to call or email with any questions or concerns at any time.     Future Appointments   Date Time Provider Department Center   1/2/2024  2:00 PM Silvia uBchanan MD [facility] INT [facility] [place]   3/6/2024  2:20 PM Myke Nunez MD [facility]       Report Electronically Signed By:    Shelli Fox DPM   Podiatric Medicine & Surgery  [facility]  11/22/2023    Disclaimer: This note was partially prepared using a voice recognition system and is likely to have sound alike errors within the text.

## 2023-12-23 DIAGNOSIS — E78.2 MIXED HYPERLIPIDEMIA: ICD-10-CM

## 2023-12-23 NOTE — TELEPHONE ENCOUNTER
No care due was identified.  [facility] Embedded Care Due Messages. Reference number: [id number].   12/23/2023 5:46:24 AM CST

## 2023-12-27 RX ORDER — FENOFIBRATE 145 MG/1
145 TABLET, FILM COATED ORAL
Qty: 90 TABLET | Refills: 0 | Status: SHIPPED | OUTPATIENT
Start: 2023-12-27 | End: 2024-03-22

## 2024-01-02 ENCOUNTER — TELEPHONE (OUTPATIENT)
Dept: PAIN MEDICINE | Facility: CLINIC | Age: 49
End: 2024-01-02
Payer: COMMERCIAL

## 2024-01-02 NOTE — TELEPHONE ENCOUNTER
Called patient to confirm appointment . Patent Answered and did confirmed.        Current Issues/Problems reviewed on call:Patient reported that she is not feeling well, having diarrhea.     Call completed with: Patient     Social Determinants of Health Identified: Coping/Stress    Community Resources Needed? Yes   If Yes, what resources were provided?  Humble suggested [facility] [place] for a therapist appt. Because the practice seems to have opened up for appts.  Humble also recommended [facility] as a 2nd choice recommended by Melissa. Patient agreed to call humble if she was successful scheduling a therapy  appt.    Time Frame for Follow up if needed: within 1-2 weeks    HUMBLE Goal reviewed with patient. Patient agrees with the HUMBLE plan of care and call follow-up plan.     Care Plan reviewed with Carolyn CULP on 9/k22/22 via EMR

## 2024-01-22 ENCOUNTER — PATIENT MESSAGE (OUTPATIENT)
Dept: PODIATRY | Facility: CLINIC | Age: 49
End: 2024-01-22
Payer: COMMERCIAL

## 2024-01-22 DIAGNOSIS — L93.2 CUTANEOUS LUPUS ERYTHEMATOSUS: ICD-10-CM

## 2024-01-23 RX ORDER — HYDROXYCHLOROQUINE SULFATE 200 MG/1
200 TABLET, FILM COATED ORAL 2 TIMES DAILY
Qty: 60 TABLET | Refills: 3 | Status: SHIPPED | OUTPATIENT
Start: 2024-01-23

## 2024-02-27 DIAGNOSIS — R07.9 CHEST PAIN, UNSPECIFIED TYPE: Primary | ICD-10-CM

## 2024-02-27 DIAGNOSIS — Z00.00 ROUTINE ADULT HEALTH MAINTENANCE: ICD-10-CM

## 2024-03-22 DIAGNOSIS — E78.2 MIXED HYPERLIPIDEMIA: ICD-10-CM

## 2024-03-22 RX ORDER — FENOFIBRATE 145 MG/1
145 TABLET, FILM COATED ORAL
Qty: 90 TABLET | Refills: 0 | Status: SHIPPED | OUTPATIENT
Start: 2024-03-22

## 2024-03-22 NOTE — TELEPHONE ENCOUNTER
Care Due:                  Date            Visit Type   Department     Provider  --------------------------------------------------------------------------------                                EP -                              PRIMARY      [facility] INTERNAL  Mary Mainampati  Last Visit: 04-      CARE ([facility])   MEDICINE       Feliciano  Next Visit: None Scheduled  None         None Found                                                            Last  Test          Frequency    Reason                     Performed    Due Date  --------------------------------------------------------------------------------    Lipid Panel.  12 months..  fenofibrate, rosuvastatin  04- 04-    [facility] Embedded Care Due Messages. Reference number: [id number].   3/22/2024 5:41:14 AM CDT

## 2024-06-30 DIAGNOSIS — I10 ESSENTIAL HYPERTENSION: ICD-10-CM

## 2024-06-30 NOTE — TELEPHONE ENCOUNTER
Care Due:                  Date            Visit Type   Department     Provider  --------------------------------------------------------------------------------                                EP -                              PRIMARY      [facility] INTERNAL  Mary Mainampati  Last Visit: 04-      CARE ([facility])   MEDICINE       Feliciano  Next Visit: None Scheduled  None         None Found                                                            Last  Test          Frequency    Reason                     Performed    Due Date  --------------------------------------------------------------------------------    Office Visit  15 months..  fenofibrate, omeprazole,   04- 07-                             rosuvastatin, tadalafiL..    CBC.........  12 months..  fenofibrate..............  07-   07-    CMP.........  12 months..  fenofibrate, rosuvastatin  07-   07-    Lipid Panel.  12 months..  fenofibrate, rosuvastatin  04- 04-    [facility] Embedded Care Due Messages. Reference number: [id number].   6/30/2024 10:03:45 AM CDT

## 2024-07-01 RX ORDER — AMLODIPINE BESYLATE 10 MG/1
TABLET ORAL
Qty: 90 TABLET | Refills: 0 | Status: SHIPPED | OUTPATIENT
Start: 2024-07-01

## 2024-07-04 DIAGNOSIS — J30.1 CHRONIC SEASONAL ALLERGIC RHINITIS DUE TO POLLEN: ICD-10-CM

## 2024-07-04 DIAGNOSIS — N52.9 ERECTILE DYSFUNCTION, UNSPECIFIED ERECTILE DYSFUNCTION TYPE: ICD-10-CM

## 2024-07-04 NOTE — TELEPHONE ENCOUNTER
No care due was identified.  [facility] Embedded Care Due Messages. Reference number: [id number].   7/04/2024 9:00:18 AM CDT

## 2024-07-05 RX ORDER — FLUTICASONE PROPIONATE 50 MCG
1 SPRAY, SUSPENSION (ML) NASAL DAILY PRN
Qty: 16 G | Refills: 3 | Status: SHIPPED | OUTPATIENT
Start: 2024-07-05

## 2024-07-05 RX ORDER — TADALAFIL 5 MG/1
5 TABLET ORAL DAILY PRN
Qty: 30 TABLET | Refills: 0 | Status: SHIPPED | OUTPATIENT
Start: 2024-07-05

## 2024-09-20 ENCOUNTER — OFFICE VISIT (OUTPATIENT)
Dept: INTERNAL MEDICINE | Facility: CLINIC | Age: 49
End: 2024-09-20
Payer: COMMERCIAL

## 2024-09-20 VITALS
BODY MASS INDEX: 34.41 KG/M2 | DIASTOLIC BLOOD PRESSURE: 94 MMHG | HEART RATE: 106 BPM | WEIGHT: 245.81 LBS | OXYGEN SATURATION: 99 % | SYSTOLIC BLOOD PRESSURE: 136 MMHG | HEIGHT: 71 IN | TEMPERATURE: 98 F

## 2024-09-20 DIAGNOSIS — E66.9 OBESITY (BMI 30.0-34.9): ICD-10-CM

## 2024-09-20 DIAGNOSIS — E55.9 VITAMIN D DEFICIENCY: ICD-10-CM

## 2024-09-20 DIAGNOSIS — E78.2 MIXED HYPERLIPIDEMIA: ICD-10-CM

## 2024-09-20 DIAGNOSIS — I10 ESSENTIAL HYPERTENSION: ICD-10-CM

## 2024-09-20 DIAGNOSIS — K21.9 GASTROESOPHAGEAL REFLUX DISEASE, UNSPECIFIED WHETHER ESOPHAGITIS PRESENT: ICD-10-CM

## 2024-09-20 DIAGNOSIS — M35.9 UNDIFFERENTIATED CONNECTIVE TISSUE DISEASE: ICD-10-CM

## 2024-09-20 DIAGNOSIS — Z00.00 ROUTINE GENERAL MEDICAL EXAMINATION AT A HEALTH CARE FACILITY: Primary | ICD-10-CM

## 2024-09-20 DIAGNOSIS — Z72.0 TOBACCO ABUSE: ICD-10-CM

## 2024-09-20 DIAGNOSIS — M16.12 PRIMARY OSTEOARTHRITIS OF LEFT HIP: ICD-10-CM

## 2024-09-20 PROBLEM — Z01.810 PREOP CARDIOVASCULAR EXAM: Status: RESOLVED | Noted: 2023-10-05 | Resolved: 2024-09-20

## 2024-09-20 PROCEDURE — 99999 PR PBB SHADOW E&M-EST. PATIENT-LVL IV: CPT | Mod: PBBFAC,,, | Performed by: FAMILY MEDICINE

## 2024-09-20 RX ORDER — FENOFIBRATE 145 MG/1
145 TABLET, FILM COATED ORAL DAILY
Qty: 90 TABLET | Refills: 1 | Status: SHIPPED | OUTPATIENT
Start: 2024-09-20

## 2024-09-20 RX ORDER — OMEPRAZOLE 40 MG/1
40 CAPSULE, DELAYED RELEASE ORAL EVERY MORNING
Qty: 90 CAPSULE | Refills: 1 | Status: SHIPPED | OUTPATIENT
Start: 2024-09-20 | End: 2025-09-20

## 2024-09-20 RX ORDER — AMLODIPINE BESYLATE 10 MG/1
10 TABLET ORAL DAILY
Qty: 90 TABLET | Refills: 1 | Status: SHIPPED | OUTPATIENT
Start: 2024-09-20

## 2024-09-20 NOTE — PROGRESS NOTES
Subjective:       Patient ID: Yoandy Forde is a 49 y.o. male presents with   Patient Active Problem List   Diagnosis    Dermatophytosis of other specified sites    Undifferentiated connective tissue disease    Essential hypertension    GERD (gastroesophageal reflux disease)    Sjogren's syndrome    Medication monitoring encounter    Tobacco abuse    Vitamin D deficiency    Obesity (BMI 30.0-34.9)    Osteoarthritis of left hip    Mixed hyperlipidemia        Chief Complaint: Annual Exam    History of Present Illness    Yoandy presents today for yearly physicals and follow up. His mother is currently hospitalized due to kidney issues and diabetes complications, causing him stress. He reports a recent elevated blood pressure reading of 136/94. He requests a refill for amlodipine, his blood pressure medication.  Usually blood pressure ranges are in very good range at home    He experiences joint pain with flare-ups, currently managed with meloxicam as needed. He previously used naproxen but prefers meloxicam for its effectiveness.     He is currently taking fenofibrate daily for cholesterol management. He discontinued Crestor some time ago but is unable to recall the exact timeframe.     He experiences acid reflux and requests a refill for omeprazole, though he admits to not taking it regularly.     He reports his previous rheumatologist has left the practice and he has not been assigned a new one. He continues taking hydroxychloroquine as prescribed by the previous rheumatologist and expresses concern about obtaining refills. He had COVID-19 last month, which he describes as feeling similar to the flu.   He is due for shingles vaccine, flu vaccine, COVID booster, and pneumonia shot. He reports not normally receiving flu vaccines but expresses interest in considering the flu vaccination, though not during the current visit.       Review of Systems   Constitutional:  Negative for appetite change and fatigue.   Eyes:   "Negative for visual disturbance.   Respiratory:  Negative for shortness of breath.    Cardiovascular:  Negative for chest pain, palpitations and leg swelling.   Gastrointestinal:  Negative for abdominal pain, nausea and vomiting.   Musculoskeletal:  Positive for arthralgias. Negative for myalgias.   Skin:  Negative for rash.   Neurological:  Negative for weakness, numbness and headaches.   Psychiatric/Behavioral:  Negative for sleep disturbance.          BP (!) 136/94 (BP Location: Left arm, Patient Position: Sitting, BP Method: Large (Automatic))   Pulse 106   Temp 98.3 °F (36.8 °C) (Tympanic)   Ht 5' 11" (1.803 m)   Wt 111.5 kg (245 lb 13 oz)   SpO2 99%   BMI 34.28 kg/m²   Objective:      Physical Exam  Constitutional:       Appearance: He is well-developed.   HENT:      Head: Normocephalic and atraumatic.   Cardiovascular:      Rate and Rhythm: Normal rate and regular rhythm.      Heart sounds: Normal heart sounds. No murmur heard.  Pulmonary:      Effort: Pulmonary effort is normal.      Breath sounds: Normal breath sounds. No wheezing.   Abdominal:      General: Bowel sounds are normal.      Palpations: Abdomen is soft.      Tenderness: There is no abdominal tenderness.   Skin:     General: Skin is warm and dry.      Findings: No rash.   Neurological:      Mental Status: He is alert and oriented to person, place, and time.   Psychiatric:         Mood and Affect: Mood normal.           Assessment/Plan:   1. Routine general medical examination at a health care facility  -     Urinalysis, Reflex to Urine Culture Urine, Clean Catch; Future; Expected date: 09/20/2024  -     Hemoglobin A1C; Future; Expected date: 09/20/2024  -     TSH; Future; Expected date: 09/20/2024  -     Lipid Panel; Future; Expected date: 09/20/2024  -     Comprehensive Metabolic Panel; Future; Expected date: 09/20/2024  -     CBC Auto Differential; Future; Expected date: 09/20/2024  -     PSA, Screening; Future; Expected date: " 09/20/2024  Vital signs stable today.  Clinical exam stable  Continue lifestyle modifications with low-fat and low-cholesterol diet and exercise 30 minutes daily    2. Essential hypertension  -     Urinalysis, Reflex to Urine Culture Urine, Clean Catch; Future; Expected date: 09/20/2024  -     TSH; Future; Expected date: 09/20/2024  -     Lipid Panel; Future; Expected date: 09/20/2024  -     Comprehensive Metabolic Panel; Future; Expected date: 09/20/2024  -     amLODIPine (NORVASC) 10 MG tablet; Take 1 tablet (10 mg total) by mouth once daily.  Dispense: 90 tablet; Refill: 1  Blood pressure mildly elevated today but reports stable blood pressures at home, continue amlodipine 10 mg daily and encouraged to monitor blood pressure trends and if remains elevated beyond 140/90 return to the clinic    3. Mixed hyperlipidemia  -     Lipid Panel; Future; Expected date: 09/20/2024  -     fenofibrate (TRICOR) 145 MG tablet; Take 1 tablet (145 mg total) by mouth once daily.  Dispense: 90 tablet; Refill: 1  Currently taking fenofibrate 145 mg, refill given today and has not been taking Crestor for past several months  Will check fasting lipid profile    4. Vitamin D deficiency  -     CBC Auto Differential; Future; Expected date: 09/20/2024  -     Vitamin D; Future; Expected date: 09/20/2024  Will check vitamin-D levels    5. Gastroesophageal reflux disease, unspecified whether esophagitis present  -     omeprazole (PRILOSEC) 40 MG capsule; Take 1 capsule (40 mg total) by mouth every morning.  Dispense: 90 capsule; Refill: 1  Stable on omeprazole 40 mg daily, refill given today    6. Undifferentiated connective tissue disease  8. Primary osteoarthritis of left hip    Followed by Rheumatology currently taking hydroxychloroquine and meloxicam as needed  Patient was advised to follow-up with Rheumatology Department to get establish care with new rheumatologist    7. Tobacco abuse  Encouraged to quit smoking      9. Obesity (BMI  30.0-34.9)  Lifestyle modifications discussed to lose weight with BMI 34    Due for Prevnar shingles flu and COVID booster, encouraged to consider getting these vaccinations at outside pharmacy

## 2024-09-21 ENCOUNTER — LAB VISIT (OUTPATIENT)
Dept: LAB | Facility: HOSPITAL | Age: 49
End: 2024-09-21
Payer: COMMERCIAL

## 2024-09-21 ENCOUNTER — LAB VISIT (OUTPATIENT)
Dept: LAB | Facility: HOSPITAL | Age: 49
End: 2024-09-21
Attending: FAMILY MEDICINE
Payer: COMMERCIAL

## 2024-09-21 DIAGNOSIS — Z00.00 ROUTINE GENERAL MEDICAL EXAMINATION AT A HEALTH CARE FACILITY: ICD-10-CM

## 2024-09-21 DIAGNOSIS — I10 ESSENTIAL HYPERTENSION: ICD-10-CM

## 2024-09-21 DIAGNOSIS — E55.9 VITAMIN D DEFICIENCY: ICD-10-CM

## 2024-09-21 DIAGNOSIS — E78.2 MIXED HYPERLIPIDEMIA: ICD-10-CM

## 2024-09-21 LAB
25(OH)D3+25(OH)D2 SERPL-MCNC: 26 NG/ML (ref 30–96)
ALBUMIN SERPL BCP-MCNC: 4.2 G/DL (ref 3.5–5.2)
ALP SERPL-CCNC: 55 U/L (ref 55–135)
ALT SERPL W/O P-5'-P-CCNC: 20 U/L (ref 10–44)
ANION GAP SERPL CALC-SCNC: 8 MMOL/L (ref 8–16)
AST SERPL-CCNC: 25 U/L (ref 10–40)
BASOPHILS # BLD AUTO: 0.06 K/UL (ref 0–0.2)
BASOPHILS NFR BLD: 1.1 % (ref 0–1.9)
BILIRUB SERPL-MCNC: 0.4 MG/DL (ref 0.1–1)
BILIRUB UR QL STRIP: NEGATIVE
BUN SERPL-MCNC: 14 MG/DL (ref 6–20)
CALCIUM SERPL-MCNC: 9.2 MG/DL (ref 8.7–10.5)
CHLORIDE SERPL-SCNC: 110 MMOL/L (ref 95–110)
CHOLEST SERPL-MCNC: 146 MG/DL (ref 120–199)
CHOLEST/HDLC SERPL: 3.1 {RATIO} (ref 2–5)
CLARITY UR: CLEAR
CO2 SERPL-SCNC: 23 MMOL/L (ref 23–29)
COLOR UR: YELLOW
COMPLEXED PSA SERPL-MCNC: 0.75 NG/ML (ref 0–4)
CREAT SERPL-MCNC: 1.1 MG/DL (ref 0.5–1.4)
DIFFERENTIAL METHOD BLD: ABNORMAL
EOSINOPHIL # BLD AUTO: 0.3 K/UL (ref 0–0.5)
EOSINOPHIL NFR BLD: 5.5 % (ref 0–8)
ERYTHROCYTE [DISTWIDTH] IN BLOOD BY AUTOMATED COUNT: 13.6 % (ref 11.5–14.5)
EST. GFR  (NO RACE VARIABLE): >60 ML/MIN/1.73 M^2
ESTIMATED AVG GLUCOSE: 111 MG/DL (ref 68–131)
GLUCOSE SERPL-MCNC: 95 MG/DL (ref 70–110)
GLUCOSE UR QL STRIP: NEGATIVE
HBA1C MFR BLD: 5.5 % (ref 4–5.6)
HCT VFR BLD AUTO: 43.4 % (ref 40–54)
HDLC SERPL-MCNC: 47 MG/DL (ref 40–75)
HDLC SERPL: 32.2 % (ref 20–50)
HGB BLD-MCNC: 14.9 G/DL (ref 14–18)
HGB UR QL STRIP: NEGATIVE
IMM GRANULOCYTES # BLD AUTO: 0.03 K/UL (ref 0–0.04)
IMM GRANULOCYTES NFR BLD AUTO: 0.6 % (ref 0–0.5)
KETONES UR QL STRIP: NEGATIVE
LDLC SERPL CALC-MCNC: 86.2 MG/DL (ref 63–159)
LEUKOCYTE ESTERASE UR QL STRIP: NEGATIVE
LYMPHOCYTES # BLD AUTO: 1.9 K/UL (ref 1–4.8)
LYMPHOCYTES NFR BLD: 36.4 % (ref 18–48)
MCH RBC QN AUTO: 31.6 PG (ref 27–31)
MCHC RBC AUTO-ENTMCNC: 34.3 G/DL (ref 32–36)
MCV RBC AUTO: 92 FL (ref 82–98)
MONOCYTES # BLD AUTO: 0.7 K/UL (ref 0.3–1)
MONOCYTES NFR BLD: 12.7 % (ref 4–15)
NEUTROPHILS # BLD AUTO: 2.3 K/UL (ref 1.8–7.7)
NEUTROPHILS NFR BLD: 43.7 % (ref 38–73)
NITRITE UR QL STRIP: NEGATIVE
NONHDLC SERPL-MCNC: 99 MG/DL
NRBC BLD-RTO: 0 /100 WBC
PH UR STRIP: 6 [PH] (ref 5–8)
PLATELET # BLD AUTO: 235 K/UL (ref 150–450)
PMV BLD AUTO: 10.7 FL (ref 9.2–12.9)
POTASSIUM SERPL-SCNC: 4.1 MMOL/L (ref 3.5–5.1)
PROT SERPL-MCNC: 7 G/DL (ref 6–8.4)
PROT UR QL STRIP: NEGATIVE
RBC # BLD AUTO: 4.72 M/UL (ref 4.6–6.2)
SODIUM SERPL-SCNC: 141 MMOL/L (ref 136–145)
SP GR UR STRIP: 1.02 (ref 1–1.03)
TRIGL SERPL-MCNC: 64 MG/DL (ref 30–150)
TSH SERPL DL<=0.005 MIU/L-ACNC: 2.06 UIU/ML (ref 0.4–4)
URN SPEC COLLECT METH UR: NORMAL
WBC # BLD AUTO: 5.28 K/UL (ref 3.9–12.7)

## 2024-09-21 PROCEDURE — 82306 VITAMIN D 25 HYDROXY: CPT | Performed by: FAMILY MEDICINE

## 2024-09-21 PROCEDURE — 84443 ASSAY THYROID STIM HORMONE: CPT | Performed by: FAMILY MEDICINE

## 2024-09-21 PROCEDURE — 80053 COMPREHEN METABOLIC PANEL: CPT | Performed by: FAMILY MEDICINE

## 2024-09-21 PROCEDURE — 84153 ASSAY OF PSA TOTAL: CPT | Performed by: FAMILY MEDICINE

## 2024-09-21 PROCEDURE — 85025 COMPLETE CBC W/AUTO DIFF WBC: CPT | Performed by: FAMILY MEDICINE

## 2024-09-21 PROCEDURE — 80061 LIPID PANEL: CPT | Performed by: FAMILY MEDICINE

## 2024-09-21 PROCEDURE — 83036 HEMOGLOBIN GLYCOSYLATED A1C: CPT | Performed by: FAMILY MEDICINE

## 2024-09-21 PROCEDURE — 36415 COLL VENOUS BLD VENIPUNCTURE: CPT | Performed by: FAMILY MEDICINE

## 2024-09-21 PROCEDURE — 81003 URINALYSIS AUTO W/O SCOPE: CPT | Performed by: FAMILY MEDICINE

## 2024-09-23 DIAGNOSIS — E55.9 VITAMIN D DEFICIENCY: Primary | ICD-10-CM

## 2024-09-23 RX ORDER — ERGOCALCIFEROL 1.25 MG/1
50000 CAPSULE ORAL
Qty: 12 CAPSULE | Refills: 0 | Status: SHIPPED | OUTPATIENT
Start: 2024-09-23

## 2024-10-14 ENCOUNTER — PATIENT MESSAGE (OUTPATIENT)
Dept: RHEUMATOLOGY | Facility: CLINIC | Age: 49
End: 2024-10-14
Payer: COMMERCIAL

## 2024-10-14 DIAGNOSIS — L93.2 CUTANEOUS LUPUS ERYTHEMATOSUS: ICD-10-CM

## 2024-10-16 RX ORDER — HYDROXYCHLOROQUINE SULFATE 200 MG/1
200 TABLET, FILM COATED ORAL 2 TIMES DAILY
Qty: 60 TABLET | Refills: 3 | Status: SHIPPED | OUTPATIENT
Start: 2024-10-16

## 2024-12-04 DIAGNOSIS — I10 ESSENTIAL HYPERTENSION: ICD-10-CM

## 2024-12-04 RX ORDER — AMLODIPINE BESYLATE 10 MG/1
10 TABLET ORAL DAILY
Qty: 90 TABLET | Refills: 1 | Status: SHIPPED | OUTPATIENT
Start: 2024-12-04

## 2024-12-04 NOTE — TELEPHONE ENCOUNTER
No care due was identified.  Health [facility] Embedded Care Due Messages. Reference number: [id number].   12/04/2024 11:50:07 AM CST

## 2024-12-23 DIAGNOSIS — E78.2 MIXED HYPERLIPIDEMIA: ICD-10-CM

## 2024-12-23 RX ORDER — FENOFIBRATE 145 MG/1
145 TABLET, FILM COATED ORAL DAILY
Qty: 90 TABLET | Refills: 2 | Status: SHIPPED | OUTPATIENT
Start: 2024-12-23

## 2024-12-23 NOTE — TELEPHONE ENCOUNTER
No care due was identified.  Health [facility] Embedded Care Due Messages. Reference number: [id number].   12/23/2024 10:16:35 AM CST

## 2024-12-24 NOTE — TELEPHONE ENCOUNTER
Refill Decision Note   Yoandy Forde  is requesting a refill authorization.  Brief Assessment and Rationale for Refill:  Approve     Medication Therapy Plan:         Comments:     Note composed:8:58 PM 12/23/2024

## 2025-01-30 ENCOUNTER — OFFICE VISIT (OUTPATIENT)
Dept: INTERNAL MEDICINE | Facility: CLINIC | Age: 50
End: 2025-01-30
Payer: COMMERCIAL

## 2025-01-30 VITALS
DIASTOLIC BLOOD PRESSURE: 88 MMHG | HEIGHT: 71 IN | BODY MASS INDEX: 34.88 KG/M2 | WEIGHT: 249.13 LBS | OXYGEN SATURATION: 98 % | SYSTOLIC BLOOD PRESSURE: 138 MMHG | TEMPERATURE: 101 F | HEART RATE: 108 BPM

## 2025-01-30 DIAGNOSIS — F17.200 NEEDS SMOKING CESSATION EDUCATION: ICD-10-CM

## 2025-01-30 DIAGNOSIS — R07.9 CHEST PAIN, UNSPECIFIED TYPE: ICD-10-CM

## 2025-01-30 DIAGNOSIS — J10.1 INFLUENZA A: Primary | ICD-10-CM

## 2025-01-30 DIAGNOSIS — R05.9 COUGH, UNSPECIFIED TYPE: ICD-10-CM

## 2025-01-30 DIAGNOSIS — R52 GENERALIZED BODY ACHES IN PEDIATRIC PATIENT: ICD-10-CM

## 2025-01-30 LAB
CTP QC/QA: YES
CTP QC/QA: YES
POC MOLECULAR INFLUENZA A AGN: POSITIVE
POC MOLECULAR INFLUENZA B AGN: NEGATIVE
SARS-COV-2 RDRP RESP QL NAA+PROBE: NEGATIVE

## 2025-01-30 PROCEDURE — 3079F DIAST BP 80-89 MM HG: CPT | Mod: CPTII,S$GLB,, | Performed by: NURSE PRACTITIONER

## 2025-01-30 PROCEDURE — 1159F MED LIST DOCD IN RCRD: CPT | Mod: CPTII,S$GLB,, | Performed by: NURSE PRACTITIONER

## 2025-01-30 PROCEDURE — 87502 INFLUENZA DNA AMP PROBE: CPT | Mod: QW,S$GLB,, | Performed by: NURSE PRACTITIONER

## 2025-01-30 PROCEDURE — 99214 OFFICE O/P EST MOD 30 MIN: CPT | Mod: S$GLB,,, | Performed by: NURSE PRACTITIONER

## 2025-01-30 PROCEDURE — 3075F SYST BP GE 130 - 139MM HG: CPT | Mod: CPTII,S$GLB,, | Performed by: NURSE PRACTITIONER

## 2025-01-30 PROCEDURE — 87635 SARS-COV-2 COVID-19 AMP PRB: CPT | Mod: QW,S$GLB,, | Performed by: NURSE PRACTITIONER

## 2025-01-30 PROCEDURE — 3008F BODY MASS INDEX DOCD: CPT | Mod: CPTII,S$GLB,, | Performed by: NURSE PRACTITIONER

## 2025-01-30 PROCEDURE — G2211 COMPLEX E/M VISIT ADD ON: HCPCS | Mod: S$GLB,,, | Performed by: NURSE PRACTITIONER

## 2025-01-30 PROCEDURE — 99999 PR PBB SHADOW E&M-EST. PATIENT-LVL IV: CPT | Mod: PBBFAC,,, | Performed by: NURSE PRACTITIONER

## 2025-01-30 RX ORDER — OSELTAMIVIR PHOSPHATE 75 MG/1
75 CAPSULE ORAL 2 TIMES DAILY
Qty: 10 CAPSULE | Refills: 0 | Status: SHIPPED | OUTPATIENT
Start: 2025-01-30 | End: 2025-02-04

## 2025-01-30 RX ORDER — PROMETHAZINE HYDROCHLORIDE AND DEXTROMETHORPHAN HYDROBROMIDE 6.25; 15 MG/5ML; MG/5ML
5 SYRUP ORAL EVERY 4 HOURS PRN
Qty: 118 ML | Refills: 0 | Status: SHIPPED | OUTPATIENT
Start: 2025-01-30

## 2025-01-30 RX ORDER — ACETAMINOPHEN 325 MG/1
975 TABLET ORAL
Status: COMPLETED | OUTPATIENT
Start: 2025-01-30 | End: 2025-01-30

## 2025-01-30 RX ADMIN — ACETAMINOPHEN 975 MG: 325 TABLET ORAL at 10:01

## 2025-01-30 NOTE — PATIENT INSTRUCTIONS
Flu Discharge Instructions, Adult   About this topic   Influenza, or flu, is an infection caused by the influenza virus. It affects your throat, breathing tube, and lungs (the respiratory system). It spreads from a person who is sick to some other person from close contact. Flu may cause:  Fever over 100.4°F (38°C)  Chills  Body aches  Headache  Cough  Runny or stuffy nose  Sore throat  Tiredness  Throwing up  What care is needed at home?   Ask your doctor what you need to do when you go home. Make sure you ask questions if you do not understand what the doctor says. This way you will know what you need to do.  Drink lots of fluids, such as water, broth, sports drinks, and tea. This will keep your fluid levels up.  You need to rest while you are getting better.  Get enough sleep.  Use a machine that makes steam like a vaporizer or humidifier. It may help open up a clogged nose so you can breathe easier.  What follow-up care is needed?   Your doctor may ask you to make visits to the office to check on your progress. Be sure to keep these visits.  What drugs may be needed?   The doctor may order drugs to:  Treat the flu  Lower fever  Help with pain  Relieve body aches  Control coughing  Will physical activity be limited?   You need to rest while you are getting better. This means you may need to limit your activity until you feel well.  What changes to diet are needed?   Eat food that will not upset your stomach such as:  Chicken soup  Bananas  Rice  Apples  Toast  What problems could happen?   Pneumonia  Too much fluid loss. This is called dehydration.  Infection  Worsening of heart and lung problems  What can be done to prevent this health problem?   Keep your hands away from your nose, eyes, and mouth. The virus most often enters the body through these parts.  Wash your hands often with soap and water for at least 20 seconds, especially after you cough or sneeze. Use alcohol-based hand sanitizers if soap and water  are not available.       Do not get close to, hug, or kiss people who are sick.  Avoid sharing your towels, tissues, food, or drink with anyone who is sick.  Avoid going to crowded places.  Get a flu shot each year.  To keep from spreading germs in the house or other places:  If you are sick, stay at home. Stay in a separate room if possible. You may spread the flu from the day before you have signs and up to 7 days after you get sick. You may return to work after the fever is gone for 24 hours without the use of drugs to lower your fever.  Cover your mouth and nose with tissue when you cough or sneeze. You can also cough into your elbow. Throw away tissues in the trash and wash your hands after touching used tissues.  Keep your house clean by wiping down counters, sinks, faucets, doorknobs, telephones, remotes, and light switches with a  with bleach. Wash dishes in the  or with hot soapy water. The flu virus can live on solid surfaces for 24 hours.     When do I need to call the doctor?   Fever or cough returns or gets worse  Chest pain with deep breathing  Confusion or sudden dizziness  Very bad throwing up or throwing up that does not stop  Trouble breathing  Passing less urine        You are not feeling better in 2 to 3 days or you are feeling worse  Teach Back: Helping You Understand   The Teach Back Method helps you understand the information we are giving you. The idea is simple. After talking with the staff, tell them in your own words what you were just told. This helps to make sure the staff has covered each thing clearly. It also helps to explain things that may have been a bit confusing. Before going home, make sure you are able to do these:  I can tell you about my condition.  I can tell you what I can do to help keep my fluid levels up.  I can tell you what I will do to keep others from getting sick.  I can tell you what I will do if I have chest pain with deep breathing, trouble  breathing, passing less urine, or very bad throwing up.  Where can I learn more?   [place] [employer]  https://www.lung.ca/lung-health/lung-disease/influenza   Centers for Disease Control and Prevention  https://www.cdc.gov/flu/highrisk/65over.htm   Centers for Disease Control and Prevention  http://www.cdc.gov/flu/   Last Reviewed Date   2020-02-28  Consumer Information Use and Disclaimer   This information is not specific medical advice and does not replace information you receive from your health care provider. This is only a brief summary of general information. It does NOT include all information about conditions, illnesses, injuries, tests, procedures, treatments, therapies, discharge instructions or life-style choices that may apply to you. You must talk with your health care provider for complete information about your health and treatment options. This information should not be used to decide whether or not to accept your health care providers advice, instructions or recommendations. Only your health care provider has the knowledge and training to provide advice that is right for you.  Copyright   Copyright © 2021 [employer], [employer] and its affiliates and/or licensors. All rights reserved.

## 2025-01-30 NOTE — LETTER
January 30, 2025      The [address]  [address] [address] [facility]  [place] [address]  Phone: [phone]  Fax: [phone]       Patient: Yoandy Forde   YOB: 1975  Date of Visit: 01/30/2025    To Whom It May Concern:    Yomaira Forde  was at [facility] on 01/30/2025. The patient may return to work/school on 2/4/2025 with no restrictions. If you have any questions or concerns, or if I can be of further assistance, please do not hesitate to contact me.    Sincerely,    Sabine Salas NP

## 2025-01-30 NOTE — PROGRESS NOTES
Subjective:      Patient ID: Yoandy Forde is a 49 y.o. male.    Chief Complaint: Cough, Generalized Body Aches, Chest Pain, and Fatigue    History of Present Illness    CHIEF COMPLAINT:  Yoandy presents today with cough and worsening flu-like symptoms    FLU-LIKE SYMPTOMS:  He reports a 4-day history of productive cough with associated chest pain during coughing episodes. Symptoms worsened yesterday. He also notes nasal congestion and nausea. He denies fever and diarrhea.    GENITOURINARY:  He reports increased urination frequency to approximately 3 times per day, which is unusual for him.    MEDICAL HISTORY:  He denies history of sickle cell disease. Last flu infection was in 2017.    MEDICATIONS:  He reports taking Plaquenil, though is unsure of indication.      ROS:  General: -fever, -chills, -fatigue, -weight gain, -weight loss  Eyes: -vision changes, -redness, -discharge  ENT: -ear pain, -nasal congestion, -sore throat  Cardiovascular: +chest pain, -palpitations, -lower extremity edema  Respiratory: +cough, -shortness of breath  Gastrointestinal: -abdominal pain, +nausea, -vomiting, -diarrhea, -constipation, -blood in stool  Genitourinary: -dysuria, -hematuria, -frequency  Musculoskeletal: -joint pain, -muscle pain  Skin: -rash, -lesion  Neurological: -headache, -dizziness, -numbness, -tingling  Psychiatric: -anxiety, -depression, -sleep difficulty          Patient Active Problem List   Diagnosis    Dermatophytosis of other specified sites    Undifferentiated connective tissue disease    Essential hypertension    GERD (gastroesophageal reflux disease)    Sjogren's syndrome    Medication monitoring encounter    Tobacco abuse    Vitamin D deficiency    Obesity (BMI 30.0-34.9)    Osteoarthritis of left hip    Mixed hyperlipidemia         Current Outpatient Medications:     amLODIPine (NORVASC) 10 MG tablet, Take 1 tablet (10 mg total) by mouth once daily., Disp: 90 tablet, Rfl: 1    clobetasol 0.05% (TEMOVATE)  "0.05 % Oint, Apply topically 2 (two) times daily., Disp: 60 g, Rfl: 1    diclofenac sodium (PENNSAID) 20 mg/gram /actuation(2 %) sopm, Apply 2 Pump topically 2 (two) times daily., Disp: 1 Bottle, Rfl: 6    ergocalciferol (ERGOCALCIFEROL) 50,000 unit Cap, Take 1 capsule (50,000 Units total) by mouth every 7 days., Disp: 12 capsule, Rfl: 0    fenofibrate (TRICOR) 145 MG tablet, Take 1 tablet (145 mg total) by mouth once daily., Disp: 90 tablet, Rfl: 2    fluticasone propionate (FLONASE) 50 mcg/actuation nasal spray, 1 spray (50 mcg total) by Each Nostril route daily as needed for Rhinitis or Allergies., Disp: 16 g, Rfl: 3    hydroxychloroquine (PLAQUENIL) 200 mg tablet, Take 1 tablet (200 mg total) by mouth 2 (two) times daily., Disp: 60 tablet, Rfl: 3    ketoconazole (NIZORAL) 2 % cream, Apply topically 2 (two) times daily. To fungal infection on foot, Disp: 15 g, Rfl: 1    meloxicam (MOBIC) 15 MG tablet, Take 1 tablet (15 mg total) by mouth once daily., Disp: 30 tablet, Rfl: 2    omeprazole (PRILOSEC) 40 MG capsule, Take 1 capsule (40 mg total) by mouth every morning., Disp: 90 capsule, Rfl: 1    tadalafiL (CIALIS) 5 MG tablet, Take 1 tablet (5 mg total) by mouth daily as needed for Erectile Dysfunction., Disp: 30 tablet, Rfl: 0    triamcinolone acetonide 0.1% (KENALOG) 0.1 % ointment, AAA bid, Disp: 80 g, Rfl: 0    oseltamivir (TAMIFLU) 75 MG capsule, Take 1 capsule (75 mg total) by mouth 2 (two) times daily. for 5 days, Disp: 10 capsule, Rfl: 0    promethazine-dextromethorphan (PROMETHAZINE-DM) 6.25-15 mg/5 mL Syrp, Take 5 mLs by mouth every 4 (four) hours as needed (cough)., Disp: 118 mL, Rfl: 0  No current facility-administered medications for this visit.      Objective:   /88 (BP Location: Left arm, Patient Position: Sitting)   Pulse 108   Temp (!) 101.1 °F (38.4 °C) (Tympanic)   Ht 5' 11" (1.803 m)   Wt 113 kg (249 lb 1.9 oz)   SpO2 98%   BMI 34.75 kg/m²     Physical Exam             Physical " Exam  Vitals and nursing note reviewed.   Constitutional:       General: He is awake. He is not in acute distress.     Appearance: Normal appearance. He is well-developed and well-groomed. He is ill-appearing. He is not toxic-appearing or diaphoretic.   HENT:      Head: Normocephalic.   Cardiovascular:      Rate and Rhythm: Regular rhythm. Tachycardia present.      Heart sounds: Normal heart sounds. No murmur heard.  Pulmonary:      Effort: Pulmonary effort is normal. No tachypnea, bradypnea, accessory muscle usage, prolonged expiration, respiratory distress or retractions.      Breath sounds: Normal breath sounds. No stridor, decreased air movement or transmitted upper airway sounds. No decreased breath sounds, wheezing, rhonchi or rales.   Musculoskeletal:      Cervical back: Normal range of motion.   Skin:     General: Skin is warm and dry.   Neurological:      Mental Status: He is alert and oriented to person, place, and time.      GCS: GCS eye subscore is 4. GCS verbal subscore is 5. GCS motor subscore is 6.      Gait: Gait normal.   Psychiatric:         Attention and Perception: Attention and perception normal.         Mood and Affect: Mood and affect normal.         Speech: Speech normal.         Behavior: Behavior normal. Behavior is cooperative.         Cognition and Memory: Cognition normal.          Assessment/Plan :   1. Influenza A  -     oseltamivir (TAMIFLU) 75 MG capsule; Take 1 capsule (75 mg total) by mouth 2 (two) times daily. for 5 days  Dispense: 10 capsule; Refill: 0    2. Cough, unspecified type  -     POCT COVID-19 Rapid Screening  -     POCT Influenza A/B Molecular  -     promethazine-dextromethorphan (PROMETHAZINE-DM) 6.25-15 mg/5 mL Syrp; Take 5 mLs by mouth every 4 (four) hours as needed (cough).  Dispense: 118 mL; Refill: 0    3. Generalized body aches in pediatric patient  -     acetaminophen tablet 975 mg    4. Chest pain, unspecified type  - when coughing only  - will monitor  - ER if  symptoms worsen or persists       Assessment & Plan    Diagnosed patient with influenza based on symptoms including cough, mucus production, nasal congestion, and headaches worsening over the past day  Lungs clear on exam, but monitoring for potential progression to pneumonia  Considered patient's existing medications, including Plaquenil for undetermined reason    INFLUENZA:  - Diagnosed the patient with influenza.  - Explained that influenza typically lasts for 5 days and the patient is contagious during this period.  - Discussed the risk of flu progressing to pneumonia.  - Prescribed Tamiflu (antiviral for influenza).  - Instructed to take Tylenol 1 g every 6 hours with food.  - Advised to alternate with Motrin 400 mg every 3 hours for symptom relief.  - Prescribed promethazine for cough.  - Reviewed the importance of staying hydrated and eating regularly to maintain strength.  - Explained proper sleep positioning to facilitate breathing (elevated head of bed).  - Advised the patient to follow up on February 4th (after 5 days of illness) to return to work.  - Instructed to contact the office if wheezing develops.  - Provided a work excuse and advised on the duration of contagiousness.  - Yoandy to stay out of bed as much as possible during the day.  - Recommend drinking plenty of fluids, including water and Gatorade.  - Yoandy to eat something every day, even if just soup, to maintain strength.  - Yoandy to sleep with head elevated on pillows.  - Yoandy can make a hot toddy at night only (as per patient's knowledge of recipe).    FOLLOW UP:  - Monitored the patient's symptoms, including cough for about a week, progressed to chest pain, mucus production, and nasal congestion.  - Symptoms worsened yesterday.  - Yoandy denies fever but reports headaches and increased urination frequency.  - Evaluated the patient's heart and lungs.  - Lungs are clear.  - Yoandy reports chest pain when coughing, but no pain when breathing.           Follow up if symptoms worsen or fail to improve.    This note was generated with the assistance of ambient listening technology. Verbal consent was obtained by the patient and accompanying visitor(s) for the recording of patient appointment to facilitate this note. I attest to having reviewed and edited the generated note for accuracy, though some syntax or spelling errors may persist. Please contact the author of this note for any clarification.

## 2025-02-04 ENCOUNTER — TELEPHONE (OUTPATIENT)
Dept: INTERNAL MEDICINE | Facility: CLINIC | Age: 50
End: 2025-02-04
Payer: COMMERCIAL

## 2025-02-04 NOTE — TELEPHONE ENCOUNTER
----- Message from Akilah sent at 2/4/2025 10:50 AM CST -----  Name of Who is Calling:FREDRICK SAVAGE [[id number]]        What is the request in detail: pt needs an update work excuse he wasn't feeling good today and his note says go back to work today but he needs an update saying he can go back tomorrow 2/5  and he will like fax over to [phone] [employer]         Can the clinic reply by [facility]:call back         What Number to Call Back [facility]: Telephone Information:  Mobile          [phone]

## 2025-02-04 NOTE — LETTER
February 4, 2025      The [address]  [address] [address] [facility]  [place] [address]  Phone: [phone]  Fax: [phone]       Patient: Yoandy Forde   YOB: 1975  Date of Visit: 01/30/2025  To Whom It May Concern:    Yomaira Forde  was at [facility] on 01/30/2025. The patient may return to work on 02/05/2025 with no restrictions. If you have any questions or concerns, or if I can be of further assistance, please do not hesitate to contact me.    Sincerely,    Arabella Blake MA

## 2025-03-13 ENCOUNTER — PATIENT MESSAGE (OUTPATIENT)
Dept: RHEUMATOLOGY | Facility: CLINIC | Age: 50
End: 2025-03-13
Payer: COMMERCIAL

## 2025-03-24 ENCOUNTER — RESULTS FOLLOW-UP (OUTPATIENT)
Dept: INTERNAL MEDICINE | Facility: CLINIC | Age: 50
End: 2025-03-24

## 2025-03-24 ENCOUNTER — HOSPITAL ENCOUNTER (OUTPATIENT)
Dept: RADIOLOGY | Facility: HOSPITAL | Age: 50
Discharge: HOME | End: 2025-03-24
Attending: FAMILY MEDICINE
Payer: COMMERCIAL

## 2025-03-24 ENCOUNTER — OFFICE VISIT (OUTPATIENT)
Dept: INTERNAL MEDICINE | Facility: CLINIC | Age: 50
End: 2025-03-24
Payer: COMMERCIAL

## 2025-03-24 VITALS
OXYGEN SATURATION: 99 % | TEMPERATURE: 98 F | DIASTOLIC BLOOD PRESSURE: 92 MMHG | BODY MASS INDEX: 35.21 KG/M2 | SYSTOLIC BLOOD PRESSURE: 144 MMHG | RESPIRATION RATE: 20 BRPM | HEART RATE: 78 BPM | WEIGHT: 252.44 LBS

## 2025-03-24 DIAGNOSIS — R05.3 CHRONIC COUGH: Primary | ICD-10-CM

## 2025-03-24 DIAGNOSIS — E78.2 MIXED HYPERLIPIDEMIA: ICD-10-CM

## 2025-03-24 DIAGNOSIS — K21.9 GASTROESOPHAGEAL REFLUX DISEASE, UNSPECIFIED WHETHER ESOPHAGITIS PRESENT: ICD-10-CM

## 2025-03-24 DIAGNOSIS — Z72.0 TOBACCO ABUSE: ICD-10-CM

## 2025-03-24 DIAGNOSIS — M25.562 CHRONIC PAIN OF BOTH KNEES: ICD-10-CM

## 2025-03-24 DIAGNOSIS — M25.561 CHRONIC PAIN OF BOTH KNEES: ICD-10-CM

## 2025-03-24 DIAGNOSIS — M35.9 UNDIFFERENTIATED CONNECTIVE TISSUE DISEASE: ICD-10-CM

## 2025-03-24 DIAGNOSIS — G89.29 CHRONIC PAIN OF BOTH KNEES: ICD-10-CM

## 2025-03-24 DIAGNOSIS — E66.01 SEVERE OBESITY WITH BODY MASS INDEX (BMI) OF 35.0 TO 39.9 WITH SERIOUS COMORBIDITY: ICD-10-CM

## 2025-03-24 DIAGNOSIS — M25.473 ANKLE SWELLING, UNSPECIFIED LATERALITY: ICD-10-CM

## 2025-03-24 DIAGNOSIS — I10 ESSENTIAL HYPERTENSION: ICD-10-CM

## 2025-03-24 DIAGNOSIS — E55.9 VITAMIN D DEFICIENCY: ICD-10-CM

## 2025-03-24 DIAGNOSIS — R05.3 CHRONIC COUGH: ICD-10-CM

## 2025-03-24 DIAGNOSIS — E55.9 VITAMIN D DEFICIENCY: Primary | ICD-10-CM

## 2025-03-24 PROBLEM — E66.811 OBESITY (BMI 30.0-34.9): Status: RESOLVED | Noted: 2018-01-23 | Resolved: 2025-03-24

## 2025-03-24 LAB
25(OH)D3+25(OH)D2 SERPL-MCNC: 22 NG/ML (ref 30–96)
ABSOLUTE EOSINOPHIL (OHS): 0.2 K/UL
ABSOLUTE MONOCYTE (OHS): 0.51 K/UL (ref 0.3–1)
ABSOLUTE NEUTROPHIL COUNT (OHS): 2.27 K/UL (ref 1.8–7.7)
ANION GAP (OHS): 7 MMOL/L (ref 8–16)
BASOPHILS # BLD AUTO: 0.05 K/UL
BASOPHILS NFR BLD AUTO: 1 %
BNP SERPL-MCNC: <10 PG/ML (ref 0–99)
BUN SERPL-MCNC: 12 MG/DL (ref 6–20)
CALCIUM SERPL-MCNC: 9 MG/DL (ref 8.7–10.5)
CHLORIDE SERPL-SCNC: 109 MMOL/L (ref 95–110)
CO2 SERPL-SCNC: 24 MMOL/L (ref 23–29)
CREAT SERPL-MCNC: 0.9 MG/DL (ref 0.5–1.4)
ERYTHROCYTE [DISTWIDTH] IN BLOOD BY AUTOMATED COUNT: 14.2 % (ref 11.5–14.5)
GFR SERPLBLD CREATININE-BSD FMLA CKD-EPI: >60 ML/MIN/1.73/M2
GLUCOSE SERPL-MCNC: 89 MG/DL (ref 70–110)
HCT VFR BLD AUTO: 45.5 % (ref 40–54)
HGB BLD-MCNC: 15.6 GM/DL (ref 14–18)
IMM GRANULOCYTES # BLD AUTO: 0.02 K/UL (ref 0–0.04)
IMM GRANULOCYTES NFR BLD AUTO: 0.4 % (ref 0–0.5)
LYMPHOCYTES # BLD AUTO: 1.79 K/UL (ref 1–4.8)
MCH RBC QN AUTO: 31.3 PG (ref 27–50)
MCHC RBC AUTO-ENTMCNC: 34.3 G/DL (ref 32–36)
MCV RBC AUTO: 91 FL (ref 82–98)
NUCLEATED RBC (/100WBC) (OHS): 0 /100 WBC
PLATELET # BLD AUTO: 205 K/UL (ref 150–450)
PMV BLD AUTO: 10.2 FL (ref 9.2–12.9)
POTASSIUM SERPL-SCNC: 4.6 MMOL/L (ref 3.5–5.1)
RBC # BLD AUTO: 4.98 M/UL (ref 4.6–6.2)
RELATIVE EOSINOPHIL (OHS): 4.1 %
RELATIVE LYMPHOCYTE (OHS): 37 % (ref 18–48)
RELATIVE MONOCYTE (OHS): 10.5 % (ref 4–15)
RELATIVE NEUTROPHIL (OHS): 47 % (ref 38–73)
SODIUM SERPL-SCNC: 140 MMOL/L (ref 136–145)
TSH SERPL-ACNC: 2.27 UIU/ML (ref 0.4–4)
URATE SERPL-MCNC: 5.5 MG/DL (ref 3.4–7)
WBC # BLD AUTO: 4.84 K/UL (ref 3.9–12.7)

## 2025-03-24 PROCEDURE — 1159F MED LIST DOCD IN RCRD: CPT | Mod: CPTII,S$GLB,, | Performed by: FAMILY MEDICINE

## 2025-03-24 PROCEDURE — 3008F BODY MASS INDEX DOCD: CPT | Mod: CPTII,S$GLB,, | Performed by: FAMILY MEDICINE

## 2025-03-24 PROCEDURE — 84443 ASSAY THYROID STIM HORMONE: CPT | Performed by: FAMILY MEDICINE

## 2025-03-24 PROCEDURE — 83880 ASSAY OF NATRIURETIC PEPTIDE: CPT | Performed by: FAMILY MEDICINE

## 2025-03-24 PROCEDURE — 3077F SYST BP >= 140 MM HG: CPT | Mod: CPTII,S$GLB,, | Performed by: FAMILY MEDICINE

## 2025-03-24 PROCEDURE — 84550 ASSAY OF BLOOD/URIC ACID: CPT | Performed by: FAMILY MEDICINE

## 2025-03-24 PROCEDURE — 82306 VITAMIN D 25 HYDROXY: CPT | Performed by: FAMILY MEDICINE

## 2025-03-24 PROCEDURE — G2211 COMPLEX E/M VISIT ADD ON: HCPCS | Mod: S$GLB,,, | Performed by: FAMILY MEDICINE

## 2025-03-24 PROCEDURE — 73562 X-RAY EXAM OF KNEE 3: CPT | Mod: TC,50

## 2025-03-24 PROCEDURE — 99214 OFFICE O/P EST MOD 30 MIN: CPT | Mod: S$GLB,,, | Performed by: FAMILY MEDICINE

## 2025-03-24 PROCEDURE — 99999 PR PBB SHADOW E&M-EST. PATIENT-LVL IV: CPT | Mod: PBBFAC,,, | Performed by: FAMILY MEDICINE

## 2025-03-24 PROCEDURE — 71046 X-RAY EXAM CHEST 2 VIEWS: CPT | Mod: TC

## 2025-03-24 PROCEDURE — 80048 BASIC METABOLIC PNL TOTAL CA: CPT | Performed by: FAMILY MEDICINE

## 2025-03-24 PROCEDURE — 3080F DIAST BP >= 90 MM HG: CPT | Mod: CPTII,S$GLB,, | Performed by: FAMILY MEDICINE

## 2025-03-24 PROCEDURE — 85025 COMPLETE CBC W/AUTO DIFF WBC: CPT | Performed by: FAMILY MEDICINE

## 2025-03-24 PROCEDURE — 36415 COLL VENOUS BLD VENIPUNCTURE: CPT | Performed by: FAMILY MEDICINE

## 2025-03-24 RX ORDER — DICLOFENAC SODIUM 20 MG/G
2 SOLUTION TOPICAL 2 TIMES DAILY
Qty: 1 EACH | Refills: 6 | Status: SHIPPED | OUTPATIENT
Start: 2025-03-24

## 2025-03-24 RX ORDER — AMLODIPINE BESYLATE 10 MG/1
10 TABLET ORAL DAILY
Qty: 90 TABLET | Refills: 1 | Status: SHIPPED | OUTPATIENT
Start: 2025-03-24

## 2025-03-24 RX ORDER — FENOFIBRATE 145 MG/1
145 TABLET, FILM COATED ORAL DAILY
Qty: 90 TABLET | Refills: 1 | Status: SHIPPED | OUTPATIENT
Start: 2025-03-24

## 2025-03-24 NOTE — PROGRESS NOTES
Subjective:       Patient ID: Yoandy Forde is a 49 y.o. male presents with Problem List[1]     Chief Complaint: Follow-up    49-year-old  male patient with Patient Active Problem List:     Dermatophytosis of other specified sites     Undifferentiated connective tissue disease     Essential hypertension     GERD (gastroesophageal reflux disease)     Sjogren's syndrome     Medication monitoring encounter     Tobacco abuse     Vitamin D deficiency     Severe obesity with body mass index (BMI) of 35.0 to 39.9 with serious comorbidity     Osteoarthritis of left hip     Mixed hyperlipidemia  Reports that he was positive for flu on January 30, 2025, since then patient has been having chronic cough off and on lately with dry to productive, with seasonal allergies.   Denies of any fever with chills or night sweats  Has been having bilateral knee pains and ankle swelling, off and on lately for past several months.  Patient takes amlodipine 10 mg in the morning, did not take his blood pressure medications this morning.   Requesting refills today  Denies of any chest tightness or difficulty breathing with palpitations  Patient has been followed by Rheumatology taking hydroxychloroquine and meloxicam as needed for undifferentiated connective tissue disease      Review of Systems   Constitutional:  Negative for appetite change, chills, fatigue and fever.   Eyes:  Negative for visual disturbance.   Respiratory:  Positive for cough. Negative for shortness of breath.    Cardiovascular:  Negative for chest pain, palpitations and leg swelling.   Gastrointestinal:  Negative for abdominal pain, nausea and vomiting.   Musculoskeletal:  Positive for arthralgias, joint swelling and myalgias.   Skin:  Negative for rash.   Neurological:  Negative for weakness, numbness and headaches.   Psychiatric/Behavioral:  Negative for sleep disturbance.          BP (!) 144/92 (BP Location: Right arm, Patient Position: Sitting)   Pulse 78    Temp 97.7 °F (36.5 °C) (Temporal)   Resp 20   Wt 114.5 kg (252 lb 6.8 oz)   SpO2 99%   BMI 35.21 kg/m²   Objective:      Physical Exam  Constitutional:       Appearance: He is well-developed.   HENT:      Head: Normocephalic and atraumatic.      Nose: No congestion.   Cardiovascular:      Rate and Rhythm: Normal rate and regular rhythm.      Heart sounds: Normal heart sounds. No murmur heard.  Pulmonary:      Effort: Pulmonary effort is normal. No respiratory distress.      Breath sounds: Normal breath sounds. No wheezing.   Abdominal:      General: Bowel sounds are normal.      Palpations: Abdomen is soft.      Tenderness: There is no abdominal tenderness.   Musculoskeletal:         General: Swelling and tenderness present.      Comments: Positive for swelling and tenderness noted to bilateral ankles  Positive for tenderness to bilateral knees   Lymphadenopathy:      Cervical: No cervical adenopathy.   Skin:     General: Skin is warm and dry.      Findings: No rash.   Neurological:      Mental Status: He is alert and oriented to person, place, and time.   Psychiatric:         Mood and Affect: Mood normal.           Assessment/Plan:   1. Chronic cough  -     X-Ray Chest PA And Lateral; Future; Expected date: 03/24/2025  -     CBC Auto Differential  Secondary to persistent cough status post flu, will get labs and chest x-ray for further evaluation  Encouraged to take over-the-counter Claritin or Zyrtec as needed for allergies    2. Gastroesophageal reflux disease, unspecified whether esophagitis present  Stable on omeprazole 40 mg daily    3. Essential hypertension  -     Basic Metabolic Panel  -     TSH  -     amLODIPine (NORVASC) 10 MG tablet; Take 1 tablet (10 mg total) by mouth once daily.  Dispense: 90 tablet; Refill: 1  Patient was advised to try taking amlodipine 10 mg in the evening refill given today  Follow-up in 2 weeks as a nurse visit for blood pressure recheck  Patient was encouraged to monitor  any improvement in ankle swelling since changing the timing of the medication  Restrict salt intake    4. Tobacco abuse  Patient reports that he quit smoking yesterday    5. Ankle swelling, unspecified laterality  -     Basic Metabolic Panel  -     TSH  -     BNP  -     Uric Acid  Will check further labs but encouraged to elevate lower extremities restrict salt intake and try compression stockings    6. Vitamin D deficiency  -     Vitamin D  Will check vitamin-D levels as it was low in the past    7. Severe obesity with body mass index (BMI) of 35.0 to 39.9 with serious comorbidity  Lifestyle modifications recommended to lose weight with BMI 35    8. Chronic pain of both knees  -     X-Ray Knee 3 View Bilateral; Future; Expected date: 03/24/2025  -     diclofenac sodium (PENNSAID) 20 mg/gram /actuation(2 %) sopm; Apply 2 Pump  topically 2 (two) times daily.  Dispense: 1 each; Refill: 6  Secondary to chronic pain to bilateral knees Will get x-ray of bilateral knees and diclofenac topical cream prescribed today for symptomatic relief, okay to take meloxicam as needed    9. Mixed hyperlipidemia  -     fenofibrate (TRICOR) 145 MG tablet; Take 1 tablet (145 mg total) by mouth once daily.  Dispense: 90 tablet; Refill: 1  Refill given on fenofibrate 145 mg    10. Undifferentiated connective tissue disease  Followed by Rheumatology currently on hydroxychloroquine and meloxicam  If continues to have bilateral joint pains patient to discuss further with Rheumatology    Visit today included increased complexity associated with the care of the episodic problem  addressed and managing the longitudinal care of the patient due to the serious and/or complex managed problem(s) .                   [1]   Patient Active Problem List  Diagnosis    Dermatophytosis of other specified sites    Undifferentiated connective tissue disease    Essential hypertension    GERD (gastroesophageal reflux disease)    Sjogren's syndrome    Medication  monitoring encounter    Tobacco abuse    Vitamin D deficiency    Severe obesity with body mass index (BMI) of 35.0 to 39.9 with serious comorbidity    Osteoarthritis of left hip    Mixed hyperlipidemia

## 2025-03-24 NOTE — LETTER
March 24, 2025      The [address]  [address] [address] [facility]  [place] [address]  Phone: [phone]  Fax: [phone]       Patient: Yoandy Forde   YOB: 1975  Date of Visit: 03/24/2025    To Whom It May Concern:    Yomaira Forde  was at [facility] on 03/24/2025. The patient may return to work/school on 3/25/25 with no restrictions. If you have any questions or concerns, or if I can be of further assistance, please do not hesitate to contact me.    Sincerely,    LANIE Feliciano MD/TF

## 2025-03-25 RX ORDER — ERGOCALCIFEROL 1.25 MG/1
50000 CAPSULE ORAL
Qty: 10 CAPSULE | Refills: 1 | Status: SHIPPED | OUTPATIENT
Start: 2025-03-25

## 2025-03-26 ENCOUNTER — TELEPHONE (OUTPATIENT)
Dept: RHEUMATOLOGY | Facility: CLINIC | Age: 50
End: 2025-03-26
Payer: COMMERCIAL

## 2025-03-26 NOTE — TELEPHONE ENCOUNTER
Called patient to inform him that  is leaving and they would need to find new rheumatologist. I gave him our recommendations.

## 2025-04-11 ENCOUNTER — HOSPITAL ENCOUNTER (OUTPATIENT)
Dept: RADIOLOGY | Facility: HOSPITAL | Age: 50
Discharge: HOME | End: 2025-04-11
Attending: FAMILY MEDICINE
Payer: COMMERCIAL

## 2025-04-11 ENCOUNTER — OFFICE VISIT (OUTPATIENT)
Dept: INTERNAL MEDICINE | Facility: CLINIC | Age: 50
End: 2025-04-11
Payer: COMMERCIAL

## 2025-04-11 VITALS
HEART RATE: 72 BPM | HEIGHT: 71 IN | DIASTOLIC BLOOD PRESSURE: 72 MMHG | RESPIRATION RATE: 18 BRPM | OXYGEN SATURATION: 99 % | SYSTOLIC BLOOD PRESSURE: 136 MMHG | BODY MASS INDEX: 34.81 KG/M2 | WEIGHT: 248.69 LBS

## 2025-04-11 DIAGNOSIS — M25.571 CHRONIC PAIN OF RIGHT ANKLE: ICD-10-CM

## 2025-04-11 DIAGNOSIS — G89.29 CHRONIC PAIN OF RIGHT ANKLE: ICD-10-CM

## 2025-04-11 DIAGNOSIS — G89.29 CHRONIC PAIN OF RIGHT ANKLE: Primary | ICD-10-CM

## 2025-04-11 DIAGNOSIS — M25.571 CHRONIC PAIN OF RIGHT ANKLE: Primary | ICD-10-CM

## 2025-04-11 DIAGNOSIS — E55.9 VITAMIN D DEFICIENCY: ICD-10-CM

## 2025-04-11 DIAGNOSIS — E66.811 OBESITY (BMI 30.0-34.9): ICD-10-CM

## 2025-04-11 PROBLEM — E66.01 SEVERE OBESITY WITH BODY MASS INDEX (BMI) OF 35.0 TO 39.9 WITH SERIOUS COMORBIDITY: Status: RESOLVED | Noted: 2020-02-04 | Resolved: 2025-04-11

## 2025-04-11 PROCEDURE — 73610 X-RAY EXAM OF ANKLE: CPT | Mod: TC,RT

## 2025-04-11 PROCEDURE — 99999 PR PBB SHADOW E&M-EST. PATIENT-LVL IV: CPT | Mod: PBBFAC,,, | Performed by: FAMILY MEDICINE

## 2025-04-11 PROCEDURE — 73610 X-RAY EXAM OF ANKLE: CPT | Mod: 26,RT,, | Performed by: RADIOLOGY

## 2025-04-11 RX ORDER — DICLOFENAC SODIUM 20 MG/G
2 SOLUTION TOPICAL 2 TIMES DAILY
Qty: 1 EACH | Refills: 6 | Status: SHIPPED | OUTPATIENT
Start: 2025-04-11

## 2025-04-11 NOTE — PROGRESS NOTES
"Subjective:       Patient ID: Yoandy Forde is a 50 y.o. male presents with Problem List[1]     Chief Complaint: Joint Swelling (Right Ankle) and Follow-up    50-year-old  male patient with Patient Active Problem List:     Dermatophytosis of other specified sites     Undifferentiated connective tissue disease     Essential hypertension     GERD (gastroesophageal reflux disease)     Sjogren's syndrome     Medication monitoring encounter     Tobacco abuse     Vitamin D deficiency     Osteoarthritis of left hip     Mixed hyperlipidemia     Obesity (BMI 30.0-34.9)  Here with complaint of right ankle pain off and on lately for the past few weeks, patient currently plan to change his shoes and see if you would have any relief, since changing the timing of amlodipine to evening, swelling has decreased.   Denies of any chest tightness or difficulty breathing with palpitations and no injury reported  Reports pain up to 3 to 5/10 and requesting refill on Pennsaid      Review of Systems   Constitutional:  Negative for appetite change, fatigue and fever.   Eyes:  Negative for visual disturbance.   Respiratory:  Negative for shortness of breath.    Cardiovascular:  Negative for chest pain, palpitations and leg swelling.   Gastrointestinal:  Negative for abdominal pain, nausea and vomiting.   Musculoskeletal:  Positive for arthralgias and myalgias. Negative for joint swelling.   Skin:  Negative for rash.   Neurological:  Negative for weakness, numbness and headaches.   Psychiatric/Behavioral:  Negative for sleep disturbance.          /72 (BP Location: Right arm, Patient Position: Sitting)   Pulse 72   Resp 18   Ht 5' 11" (1.803 m)   Wt 112.8 kg (248 lb 10.9 oz)   SpO2 99%   BMI 34.68 kg/m²   Objective:      Physical Exam  Constitutional:       Appearance: He is well-developed.   HENT:      Head: Normocephalic and atraumatic.   Cardiovascular:      Rate and Rhythm: Normal rate and regular rhythm.      " Heart sounds: Normal heart sounds. No murmur heard.  Pulmonary:      Effort: Pulmonary effort is normal.      Breath sounds: Normal breath sounds. No wheezing.   Abdominal:      General: Bowel sounds are normal.      Palpations: Abdomen is soft.      Tenderness: There is no abdominal tenderness.   Musculoskeletal:         General: Tenderness present.      Comments: Positive for tenderness to the right ankle medially  No significant restriction in range of motion noted to the right ankle  Dorsal pedal pulses 2+ to the right foot   Skin:     General: Skin is warm and dry.      Findings: No rash.   Neurological:      Mental Status: He is alert and oriented to person, place, and time.   Psychiatric:         Mood and Affect: Mood normal.           Assessment/Plan:   1. Chronic pain of right ankle  -     diclofenac sodium (PENNSAID) 20 mg/gram /actuation(2 %) sopm; Apply 2 Pump  topically 2 (two) times daily.  Dispense: 1 each; Refill: 6  Refill given on diclofenac gel as requested and patient to take meloxicam as needed and advised to wear comfortable shoes  Will get x-ray of the right ankle for further evaluation  If no improvement in 2-4 weeks will refer to Podiatry    2. Vitamin D deficiency  Currently taking vitamin-D by prescription weekly    3. Obesity (BMI 30.0-34.9)  Lifestyle modifications discussed                  [1]   Patient Active Problem List  Diagnosis    Dermatophytosis of other specified sites    Undifferentiated connective tissue disease    Essential hypertension    GERD (gastroesophageal reflux disease)    Sjogren's syndrome    Medication monitoring encounter    Tobacco abuse    Vitamin D deficiency    Osteoarthritis of left hip    Mixed hyperlipidemia    Obesity (BMI 30.0-34.9)

## 2025-04-11 NOTE — LETTER
April 11, 2025      The [address]  [address] [address] [facility]  [place] [address]  Phone: [phone]  Fax: [phone]       Patient: Yoandy Forde   YOB: 1975  Date of Visit: 04/11/2025    To Whom It May Concern:    Yomaira Forde  was at [facility] on 04/11/2025. The patient may return to work/school on with no restrictions. If you have any questions or concerns, or if I can be of further assistance, please do not hesitate to contact me.    Sincerely,  MD Yessy Vasquez MA

## 2025-04-14 ENCOUNTER — RESULTS FOLLOW-UP (OUTPATIENT)
Dept: INTERNAL MEDICINE | Facility: CLINIC | Age: 50
End: 2025-04-14

## 2025-04-14 ENCOUNTER — TELEPHONE (OUTPATIENT)
Dept: RHEUMATOLOGY | Facility: CLINIC | Age: 50
End: 2025-04-14
Payer: COMMERCIAL

## 2025-04-23 ENCOUNTER — PATIENT MESSAGE (OUTPATIENT)
Dept: RHEUMATOLOGY | Facility: CLINIC | Age: 50
End: 2025-04-23
Payer: COMMERCIAL

## 2025-07-15 ENCOUNTER — OFFICE VISIT (OUTPATIENT)
Dept: INTERNAL MEDICINE | Facility: CLINIC | Age: 50
End: 2025-07-15
Payer: COMMERCIAL

## 2025-07-15 ENCOUNTER — HOSPITAL ENCOUNTER (OUTPATIENT)
Dept: RADIOLOGY | Facility: HOSPITAL | Age: 50
Discharge: HOME | End: 2025-07-15
Attending: FAMILY MEDICINE
Payer: COMMERCIAL

## 2025-07-15 VITALS
BODY MASS INDEX: 35.39 KG/M2 | OXYGEN SATURATION: 99 % | SYSTOLIC BLOOD PRESSURE: 142 MMHG | RESPIRATION RATE: 20 BRPM | HEART RATE: 93 BPM | TEMPERATURE: 97 F | WEIGHT: 253.75 LBS | DIASTOLIC BLOOD PRESSURE: 84 MMHG

## 2025-07-15 DIAGNOSIS — I10 ESSENTIAL HYPERTENSION: ICD-10-CM

## 2025-07-15 DIAGNOSIS — M25.562 LEFT ANTERIOR KNEE PAIN: ICD-10-CM

## 2025-07-15 DIAGNOSIS — L93.2 CUTANEOUS LUPUS ERYTHEMATOSUS: ICD-10-CM

## 2025-07-15 DIAGNOSIS — M54.12 RIGHT CERVICAL RADICULOPATHY: ICD-10-CM

## 2025-07-15 DIAGNOSIS — E66.01 SEVERE OBESITY WITH BODY MASS INDEX (BMI) OF 35.0 TO 39.9 WITH SERIOUS COMORBIDITY: ICD-10-CM

## 2025-07-15 DIAGNOSIS — M35.9 UNDIFFERENTIATED CONNECTIVE TISSUE DISEASE: ICD-10-CM

## 2025-07-15 DIAGNOSIS — M54.12 RIGHT CERVICAL RADICULOPATHY: Primary | ICD-10-CM

## 2025-07-15 PROBLEM — E66.811 OBESITY (BMI 30.0-34.9): Status: RESOLVED | Noted: 2025-04-11 | Resolved: 2025-07-15

## 2025-07-15 PROCEDURE — 3008F BODY MASS INDEX DOCD: CPT | Mod: CPTII,S$GLB,, | Performed by: FAMILY MEDICINE

## 2025-07-15 PROCEDURE — 72050 X-RAY EXAM NECK SPINE 4/5VWS: CPT | Mod: TC

## 2025-07-15 PROCEDURE — 73030 X-RAY EXAM OF SHOULDER: CPT | Mod: TC,RT

## 2025-07-15 PROCEDURE — 1159F MED LIST DOCD IN RCRD: CPT | Mod: CPTII,S$GLB,, | Performed by: FAMILY MEDICINE

## 2025-07-15 PROCEDURE — 73030 X-RAY EXAM OF SHOULDER: CPT | Mod: 26,RT,, | Performed by: STUDENT IN AN ORGANIZED HEALTH CARE EDUCATION/TRAINING PROGRAM

## 2025-07-15 PROCEDURE — 72050 X-RAY EXAM NECK SPINE 4/5VWS: CPT | Mod: 26,,, | Performed by: STUDENT IN AN ORGANIZED HEALTH CARE EDUCATION/TRAINING PROGRAM

## 2025-07-15 PROCEDURE — 3077F SYST BP >= 140 MM HG: CPT | Mod: CPTII,S$GLB,, | Performed by: FAMILY MEDICINE

## 2025-07-15 PROCEDURE — 3079F DIAST BP 80-89 MM HG: CPT | Mod: CPTII,S$GLB,, | Performed by: FAMILY MEDICINE

## 2025-07-15 PROCEDURE — 99214 OFFICE O/P EST MOD 30 MIN: CPT | Mod: S$GLB,,, | Performed by: FAMILY MEDICINE

## 2025-07-15 PROCEDURE — 99999 PR PBB SHADOW E&M-EST. PATIENT-LVL V: CPT | Mod: PBBFAC,,, | Performed by: FAMILY MEDICINE

## 2025-07-15 RX ORDER — MELOXICAM 15 MG/1
15 TABLET ORAL DAILY PRN
Qty: 30 TABLET | Refills: 1 | Status: SHIPPED | OUTPATIENT
Start: 2025-07-15

## 2025-07-15 RX ORDER — HYDROXYCHLOROQUINE SULFATE 200 MG/1
200 TABLET, FILM COATED ORAL 2 TIMES DAILY
Qty: 60 TABLET | Refills: 3 | Status: SHIPPED | OUTPATIENT
Start: 2025-07-15

## 2025-07-15 NOTE — PROGRESS NOTES
"Subjective:       Patient ID: Yoandy Forde is a 50 y.o. male presents with Problem List[1]     Chief Complaint: Follow-up and Neck Pain    History of Present Illness    Yoandy presents today with possible pinched nerve causing tingling down right arm He reports right arm and neck pain ongoing for few months with unilateral tingling sensation down the right arm. Neck pain is characterized as very intense and tight to 8/10. He has been managing symptoms with OTC pain medications. He denies similar symptoms in the left arm or neck or additional neurological symptoms. His shoulder is currently his most significant pain site. He is unable to sleep on the affected shoulder and experiences discomfort during arm movement, though he maintains range of motion. Pain has marginally improved with medication.    He reports left knee pain at its worst severity to date, significantly impacting daily functioning. Right knee pain occurs intermittently. X-rays showed no significant findings. He takes Meloxicam as needed and applies Diclofenac gel topically, which were initially helpful but have not controlled his recent worst knee pain flare-up. He denies prior physical therapy intervention.   He reports increased snoring recently. Previous sleep apnea testing was negative, however he experiences episodes of feeling like he stops breathing when resting, occasionally causing him to wake up. He typically used to lay on his right side to help with snoring but can no longer do so. He currently uses a mouthpiece to manage snoring symptoms. He notes his allergies are "so-so" which may be contributing to his snoring. He continues to smoke but reports having cut back and is actively attempting to quit.    Patient has been getting low on hydroxychloroquine and does not have a Rheumatology, requesting refill today      ROS:  General: -fever, -chills, -fatigue, -weight gain, -weight loss, -loss of appetite, +sleep disturbances, +difficulty staying " asleep  Eyes: -vision changes, -blurry vision, -eye pain, -eye discharge  ENT: -ear pain, -hearing loss, -tinnitus, -nasal congestion, -sore throat  Cardiovascular: -chest pain, -palpitations, -lower extremity edema  Respiratory: -cough, -shortness of breath, -wheezing, -sputum production, +snoring  Endocrine: -polyuria, -polydipsia, -heat intolerance, -cold intolerance  Gastrointestinal: -abdominal pain, -heartburn, -nausea, -vomiting, -diarrhea, -constipation, -blood in stool  Genitourinary: -dysuria, -urgency, -frequency, -hematuria, -nocturia, -incontinence  Heme & Lymphatic: -easy or excessive bleeding, -easy bruising, -swollen lymph nodes  Musculoskeletal: -muscle pain, -back pain, +joint pain, -joint swelling, +neck pain, +neck tension, +limb pain, +nightime pain, +pain with movement  Skin: -rash, -lesion, -itching, -skin texture changes, -skin color changes  Neurological: -headache, -dizziness, -numbness, +tingling, -seizure activity, -speech difficulty, -memory loss, -confusion  Psychiatric: -anxiety, -depression, -sleep difficulty                BP (!) 142/84 (BP Location: Left arm, Patient Position: Sitting)   Pulse 93   Temp 97.1 °F (36.2 °C) (Temporal)   Resp 20   Wt 115.1 kg (253 lb 12 oz)   SpO2 99%   BMI 35.39 kg/m²   Objective:      Physical Exam  Constitutional:       Appearance: He is well-developed.   HENT:      Head: Normocephalic and atraumatic.   Cardiovascular:      Rate and Rhythm: Normal rate and regular rhythm.      Heart sounds: Normal heart sounds. No murmur heard.  Pulmonary:      Effort: Pulmonary effort is normal.      Breath sounds: Normal breath sounds. No wheezing.   Abdominal:      General: Bowel sounds are normal.      Palpations: Abdomen is soft.      Tenderness: There is no abdominal tenderness.   Musculoskeletal:         General: Tenderness present.      Comments: Positive for tenderness to the left knee anteriorly and paraspinal cervical muscles on the right side and  right shoulder tenderness with restricted range of motion with extension and abduction   Skin:     General: Skin is warm and dry.      Findings: No rash.   Neurological:      Mental Status: He is alert and oriented to person, place, and time.   Psychiatric:         Mood and Affect: Mood normal.           Assessment/Plan:   1. Right cervical radiculopathy  -     X-Ray Cervical Spine Complete 5 view; Future; Expected date: 07/15/2025  -     Ambulatory Referral/Consult to Physical Therapy  -     meloxicam (MOBIC) 15 MG tablet; Take 1 tablet (15 mg total) by mouth daily as needed for Pain.  Dispense: 30 tablet; Refill: 1  -     X-ray Shoulder 2 or More Views Right; Future; Expected date: 07/15/2025  Will get x-ray of the cervical spine as well as shoulder for further evaluation and meloxicam refill has been given today  Will refer to physical therapy    2. Left anterior knee pain  -     Ambulatory Referral/Consult to Physical Therapy  -     meloxicam (MOBIC) 15 MG tablet; Take 1 tablet (15 mg total) by mouth daily as needed for Pain.  Dispense: 30 tablet; Refill: 1  Reviewed x-rays which was normal  Meloxicam refill has been given today and patient was advised to use Voltaren gel and consider physical therapy    3. Essential hypertension  Blood pressure is stable currently on amlodipine 10 mg    4. Severe obesity with body mass index (BMI) of 35.0 to 39.9 with serious comorbidity  Strict lifestyle changes recommended with diet and exercise to lose weight with BMI 35    5. Undifferentiated connective tissue disease  -     Ambulatory referral/consult to Rheumatology; Future; Expected date: 07/22/2025  -     hydroxychloroquine (PLAQUENIL) 200 mg tablet; Take 1 tablet (200 mg total) by mouth 2 (two) times daily.  Dispense: 60 tablet; Refill: 3  Refill given on hydroxychloroquine and referral has been placed to Rheumatology externally to make an appointment at [facility] as per availability    6.  Cutaneous lupus erythematosus  -     hydroxychloroquine (PLAQUENIL) 200 mg tablet; Take 1 tablet (200 mg total) by mouth 2 (two) times daily.  Dispense: 60 tablet; Refill: 3  As noted above         This note was generated with the assistance of ambient listening technology. Verbal consent was obtained by the patient and accompanying visitor(s) for the recording of patient appointment to facilitate this note. I attest to having reviewed and edited the generated note for accuracy, though some syntax or spelling errors may persist. Please contact the author of this note for any clarification.            [1]   Patient Active Problem List  Diagnosis    Dermatophytosis of other specified sites    Undifferentiated connective tissue disease    Essential hypertension    GERD (gastroesophageal reflux disease)    Sjogren's syndrome    Medication monitoring encounter    Tobacco abuse    Vitamin D deficiency    Severe obesity with body mass index (BMI) of 35.0 to 39.9 with serious comorbidity    Osteoarthritis of left hip    Mixed hyperlipidemia

## 2025-07-15 NOTE — LETTER
July 15, 2025      The [address]  [address] [address] [facility]  [place] [address]  Phone: [phone]  Fax: [phone]       Patient: Yoandy Forde   YOB: 1975  Date of Visit: 07/15/2025    To Whom It May Concern:    Yomaira Forde  was at [facility] on 07/15/2025. The patient may return to work/school on 7/16/2025 with no restrictions. If you have any questions or concerns, or if I can be of further assistance, please do not hesitate to contact me.    Sincerely,    Mary Feliciano M.D.,/ALLI AGULIAR

## 2025-08-14 ENCOUNTER — OFFICE VISIT (OUTPATIENT)
Dept: INTERNAL MEDICINE | Facility: CLINIC | Age: 50
End: 2025-08-14
Payer: COMMERCIAL

## 2025-08-14 VITALS
OXYGEN SATURATION: 96 % | TEMPERATURE: 97 F | WEIGHT: 253.75 LBS | HEART RATE: 94 BPM | HEIGHT: 71 IN | SYSTOLIC BLOOD PRESSURE: 148 MMHG | BODY MASS INDEX: 35.52 KG/M2 | DIASTOLIC BLOOD PRESSURE: 100 MMHG

## 2025-08-14 DIAGNOSIS — M47.22 OSTEOARTHRITIS OF SPINE WITH RADICULOPATHY, CERVICAL REGION: ICD-10-CM

## 2025-08-14 DIAGNOSIS — E66.01 SEVERE OBESITY WITH BODY MASS INDEX (BMI) OF 35.0 TO 39.9 WITH SERIOUS COMORBIDITY: ICD-10-CM

## 2025-08-14 DIAGNOSIS — M19.011 PRIMARY OSTEOARTHRITIS OF RIGHT SHOULDER: ICD-10-CM

## 2025-08-14 DIAGNOSIS — I10 ESSENTIAL HYPERTENSION: ICD-10-CM

## 2025-08-14 DIAGNOSIS — M25.511 CHRONIC RIGHT SHOULDER PAIN: ICD-10-CM

## 2025-08-14 DIAGNOSIS — M54.2 CERVICALGIA: ICD-10-CM

## 2025-08-14 DIAGNOSIS — M54.12 RIGHT CERVICAL RADICULOPATHY: Primary | ICD-10-CM

## 2025-08-14 DIAGNOSIS — G89.29 CHRONIC RIGHT SHOULDER PAIN: ICD-10-CM

## 2025-08-14 PROCEDURE — 99999 PR PBB SHADOW E&M-EST. PATIENT-LVL V: CPT | Mod: PBBFAC,,, | Performed by: FAMILY MEDICINE

## 2025-08-14 RX ORDER — METHOCARBAMOL 750 MG/1
750 TABLET, FILM COATED ORAL 3 TIMES DAILY PRN
Qty: 30 TABLET | Refills: 0 | Status: SHIPPED | OUTPATIENT
Start: 2025-08-14 | End: 2025-08-24

## 2025-08-14 RX ORDER — DICLOFENAC SODIUM 20 MG/G
2 SOLUTION TOPICAL 2 TIMES DAILY
Qty: 1 EACH | Refills: 6 | Status: SHIPPED | OUTPATIENT
Start: 2025-08-14

## 2025-08-14 RX ORDER — HYDROCHLOROTHIAZIDE 25 MG/1
25 TABLET ORAL DAILY
Qty: 30 TABLET | Refills: 11 | Status: SHIPPED | OUTPATIENT
Start: 2025-08-14 | End: 2026-08-14

## 2025-08-14 RX ORDER — GABAPENTIN 300 MG/1
300 CAPSULE ORAL NIGHTLY
Qty: 30 CAPSULE | Refills: 1 | Status: SHIPPED | OUTPATIENT
Start: 2025-08-14 | End: 2026-08-14